# Patient Record
Sex: FEMALE | Race: WHITE | NOT HISPANIC OR LATINO | Employment: OTHER | URBAN - METROPOLITAN AREA
[De-identification: names, ages, dates, MRNs, and addresses within clinical notes are randomized per-mention and may not be internally consistent; named-entity substitution may affect disease eponyms.]

---

## 2018-04-09 DIAGNOSIS — H90.8 MIXED HEARING LOSS: Primary | ICD-10-CM

## 2018-04-10 ENCOUNTER — OFFICE VISIT (OUTPATIENT)
Dept: OTOLARYNGOLOGY | Facility: CLINIC | Age: 83
End: 2018-04-10
Payer: MEDICARE

## 2018-04-10 ENCOUNTER — OFFICE VISIT (OUTPATIENT)
Dept: AUDIOLOGY | Facility: CLINIC | Age: 83
End: 2018-04-10
Payer: MEDICARE

## 2018-04-10 VITALS — WEIGHT: 137 LBS | HEIGHT: 62 IN | BODY MASS INDEX: 25.21 KG/M2

## 2018-04-10 DIAGNOSIS — H90.3 SENSORY HEARING LOSS, BILATERAL: Primary | ICD-10-CM

## 2018-04-10 DIAGNOSIS — H61.23 EXCESSIVE CERUMEN IN BOTH EAR CANALS: ICD-10-CM

## 2018-04-10 DIAGNOSIS — H90.3 ASYMMETRICAL SENSORINEURAL HEARING LOSS: Primary | ICD-10-CM

## 2018-04-10 ASSESSMENT — PAIN SCALES - GENERAL: PAINLEVEL: NO PAIN (0)

## 2018-04-10 NOTE — PROGRESS NOTES
AUDIOLOGY REPORT    SUMMARY: Audiology visit completed. See audiogram for results.      RECOMMENDATIONS: Follow-up with ENT.      Elias George, CCC-A  Licensed Audiologist  [place] #[id number]

## 2018-04-10 NOTE — PROGRESS NOTES
Dear Tyrel Melchor:    I had the pleasure of seeing Tereas Park in followup today at the [facility].    HISTORY OF PRESENT ILLNESS: Patient is an 87-year-old in today with her 2 daughters daughters. She was last seen in July 2015. She had a left sudden sensorineural hearing loss in 2008, normal MRI scan. She has a moderate severe sensorineural hearing loss on the right side that we have been monitoring. She comes in today with her daughters mainly to check with her hearing. She doesn't feel there has been a dramatic change, daughters have noticed problems mainly with the telephone lately. There's been no pain or drainage. Denies any dizziness. There is no dysphagia, hoarseness, patient paresthesias. She's have a hearing aid that does help, she has a bi-cross but never uses C opposite hearing aid.    MEDICATIONS: Please refer to the detailed medication reconciliation performed by my nurse today, which I have reviewed and signed.     ALLERGIES:    Allergies   Allergen Reactions     Morphine Nausea       HABITS:   Alcohol use No    History   Smoking Status     Former Smoker     Quit date: 7/21/1951   Smokeless Tobacco     Former User         PAST MEDICAL HISTORY:  Please see today's intake form (for the remainder of the PMH) which I reviewed and signed.  No past medical history on file.    FAMILY HISTORY/SOCIAL HISTORY:  No family history on file. Social History     Social History     Marital status:      Spouse name: N/A     Number of children: N/A     Years of education: N/A     Occupational History     Not on file.     Social History Main Topics     Smoking status: Former Smoker     Quit date: 7/21/1951     Smokeless tobacco: Former User     Alcohol use No     Drug use: No     Sexual activity: Not on file     Other Topics Concern     Not on file     Social History Narrative       REVIEW OF SYSTEMS: Please see today's intake form (for the remainder of the ROS)  which I have reviewed and signed.    PHYSICIAL EXAMINATION:  Constitutional: The patient was well-groomed and in no acute distress.   Skin: Warm and pink.  Psychiatric: The patient's affect was calm, cooperative, and appropriate.   Respiratory: Breathing comfortably without stridor or exertion of accessory muscles.  Eyes: Pupils were equal and reactive. Extraocular movement intact.   Head: Normocephalic and atraumatic. No lesions or scars.  Ears: Both ears examined and she does have considerable cerumen. For full visualization of the TM, right side was cleaned using microscope, curet, and similar techniques. Following cleaning, under high-power magnification, TM and middle ear looked normal. The opposite ear was cleaned and examined using microscope, curet, and similar techniques. TM and middle ear looked normal after cleaning.  Nose: Sinuses were nontender. Anterior rhinoscopy revealed midline septum and absence of purulence or polyps.  Oral Cavity: Normal tongue, floor of moth, buccal mucosa, and palate. No abnormal lymph tissue in the oropharynx.   Neck: The parotid is soft without masses. Supple with normal laryngeal and tracheal landmarks.   Lymphatic: There is no palpable lymphadenopathy or other masses in the neck.   Neurologic: Alert and oriented x 3. Cranial nerves III-XI within normal limits. Voice quality normal.  Cerebellar Function Tests:  Grossly normal    Audiogram:  Audiogram performed shows no hearing in the left ear with 0% discrimination. Right side shows a moderate severe slightly worse in the high frequencies sensorineural hearing loss with discrimination at 60%. I do not have any old hearing test to compare to.    IMPRESSION AND PLAN: Discussed her normal exam today after cleaning of cerumen. We discussed hearing aids as the only option to help with her hearing, she is going to look into getting newer hearing aids, to at least trial. I'm going to get her previous audiogram so we can make sure  she is not showing progression. See her back with any concerns or problems, otherwise recommend follow-up to monitor every 1-2 years.    Thank you very much for the opportunity to participate in the care of your patient.    Rick L Nissen MD

## 2018-04-10 NOTE — MR AVS SNAPSHOT
After Visit Summary   4/10/2018    Teresa Park    MRN: [id number]           Patient Information     Date Of Birth          11/21/1930        Visit Information        Provider Department      4/10/2018 10:00 AM Keily Guzmán AuD M [facility] Audiology        Today's Diagnoses     Sensory hearing loss, bilateral    -  1       Follow-ups after your visit        Your next 10 appointments already scheduled     Apr 10, 2018 11:00 AM CDT   (Arrive by 10:45 AM)   RETURN NEUROTOLOGY with Rick L Nissen, MD [facility] Ear Nose and Throat ([facility])    [address] [place] [address] [phone]              Who to contact     Please call your clinic at [phone] to:    Ask questions about your health    Make or cancel appointments    Discuss your medicines    Learn about your test results    Speak to your doctor            Additional Information About Your Visit        MyChart Information     [employer] gives you secure access to your electronic health record. If you see a primary care provider, you can also send messages to your care team and make appointments. If you have questions, please call your primary care clinic.  If you do not have a primary care provider, please call [phone] and they will assist you.      [employer] is an electronic gateway that provides easy, online access to your medical records. With [employer], you can request a clinic appointment, read your test results, renew a prescription or communicate with your care team.     To access your existing account, please contact your [facility] or call [phone] for assistance.        Care EveryWhere ID     This is your Care EveryWhere ID. This could be used by other organizations to access your [facility] medical records  [id number]         Blood Pressure from Last 3 Encounters:   No data found for BP    Weight from Last 3 Encounters:   No data found  for Wt              We Performed the Following     AUDIOGRAM/TYMPANOGRAM - INTERFACE     Reduced 52 - Cmprhn Audiometry Thrshld Eval & Speech Recog   (20269)     Tymps / Reflex   ([id number])        Primary Care Provider Office Phone # Fax #    Tyrel Vu [phone]       [facility] [address] [place] [address]        Equal Access to Services     [facility] : Hadii aad ku hadasho Soomaali, waaxda luqadaha, qaybta kaalmada adeegyada, waxay idiin hayaan adeeg kharash la'aan ah. So [facility] [phone].    ATENCIÓN: Si habla esptelma, tiene a tom disposición servicios gratuitos de asistencia lingüística. Llame al [phone].    We comply with applicable federal civil rights laws and [place] laws. We do not discriminate on the basis of race, color, national origin, age, disability, sex, sexual orientation, or gender identity.            Thank you!     Thank you for choosing [facility] AUDIOLOGY  for your care. Our goal is always to provide you with excellent care. Hearing back from our patients is one way we can continue to improve our services. Please take a few minutes to complete the written survey that you may receive in the mail after your visit with us. Thank you!             Your Updated Medication List - Protect others around you: Learn how to safely use, store and throw away your medicines at www.disposemymeds.org.          This list is accurate as of 4/10/18 10:40 AM.  Always use your most recent med list.                   Brand Name Dispense Instructions for use Diagnosis    aspirin 81 MG tablet      Take by mouth daily        CELEXA PO      Take 20 mg by mouth daily        EVISTA 60 MG tablet   Generic drug:  raloxifene      Take 60 mg by mouth daily        LIPITOR PO      Take 40 mg by mouth At Bedtime        Multi-vitamin Tabs tablet      Take 1 tablet by mouth daily        NORVASC PO      Take 5 mg by mouth daily        WELLBUTRIN PO      Take 100 mg by mouth 2 times  daily        ZETIA PO      Take 5 mg by mouth At Bedtime

## 2018-04-10 NOTE — MR AVS SNAPSHOT
"              After Visit Summary   4/10/2018    Teresa Park    MRN: [id number]           Patient Information     Date Of Birth          11/21/1930        Visit Information        Provider Department      4/10/2018 11:00 AM Nissen, Rick L, MD [facility]        Today's Diagnoses     Asymmetrical sensorineural hearing loss    -  1    Excessive cerumen in both ear canals           Follow-ups after your visit        Who to contact     Please call your clinic at [phone] to:    Ask questions about your health    Make or cancel appointments    Discuss your medicines    Learn about your test results    Speak to your doctor            Additional Information About Your Visit        MyChart Information     [employer] gives you secure access to your electronic health record. If you see a primary care provider, you can also send messages to your care team and make appointments. If you have questions, please call your primary care clinic.  If you do not have a primary care provider, please call [phone] and they will assist you.      [employer] is an electronic gateway that provides easy, online access to your medical records. With [employer], you can request a clinic appointment, read your test results, renew a prescription or communicate with your care team.     To access your existing account, please contact your [facility] or call [phone] for assistance.        Care EveryWhere ID     This is your Care EveryWhere ID. This could be used by other organizations to access your [facility] medical records  [id number]        Your Vitals Were     Height BMI (Body Mass Index)                1.562 m (5' 1.5\") 25.47 kg/m2           Blood Pressure from Last 3 Encounters:   No data found for BP    Weight from Last 3 Encounters:   04/10/18 62.1 kg (137 lb)              We Performed the Following     BINOCULAR MICROSCOPY        Primary Care Provider Office Phone # Fax #    Tyrel " MD Mirella [phone]       [facility] [address] [place] [address]        Equal Access to Services     JACOBY BERNAL : Hadii aad ku hadalonzofawad Parks, wamurrayda chaceqadaha, qaybta kaalmada hermes, waxjoy amein hayaajaida wolfebakari ayaznoe melissa celis. So [facility] [phone].    ATENCIÓN: Si habla español, tiene a tom disposición servicios gratuitos de asistencia lingüística. Llame al [phone].    We comply with applicable federal civil rights laws and [place] laws. We do not discriminate on the basis of race, color, national origin, age, disability, sex, sexual orientation, or gender identity.            Thank you!     Thank you for choosing [facility] EAR NOSE AND THROAT  for your care. Our goal is always to provide you with excellent care. Hearing back from our patients is one way we can continue to improve our services. Please take a few minutes to complete the written survey that you may receive in the mail after your visit with us. Thank you!             Your Updated Medication List - Protect others around you: Learn how to safely use, store and throw away your medicines at www.disposemymeds.org.          This list is accurate as of 4/10/18 12:48 PM.  Always use your most recent med list.                   Brand Name Dispense Instructions for use Diagnosis    aspirin 81 MG tablet      Take by mouth daily        CELEXA PO      Take 20 mg by mouth daily        EVISTA 60 MG tablet   Generic drug:  raloxifene      Take 60 mg by mouth daily        LIPITOR PO      Take 40 mg by mouth At Bedtime        Multi-vitamin Tabs tablet      Take 1 tablet by mouth daily        NORVASC PO      Take 5 mg by mouth daily        WELLBUTRIN PO      Take 100 mg by mouth 2 times daily        ZETIA PO      Take 5 mg by mouth At Bedtime

## 2019-10-01 ENCOUNTER — HEALTH MAINTENANCE LETTER (OUTPATIENT)
Age: 84
End: 2019-10-01

## 2019-12-15 ENCOUNTER — HEALTH MAINTENANCE LETTER (OUTPATIENT)
Age: 84
End: 2019-12-15

## 2021-01-15 ENCOUNTER — HEALTH MAINTENANCE LETTER (OUTPATIENT)
Age: 86
End: 2021-01-15

## 2021-09-04 ENCOUNTER — HEALTH MAINTENANCE LETTER (OUTPATIENT)
Age: 86
End: 2021-09-04

## 2022-02-19 ENCOUNTER — HEALTH MAINTENANCE LETTER (OUTPATIENT)
Age: 87
End: 2022-02-19

## 2022-10-16 ENCOUNTER — HEALTH MAINTENANCE LETTER (OUTPATIENT)
Age: 87
End: 2022-10-16

## 2023-04-01 ENCOUNTER — HEALTH MAINTENANCE LETTER (OUTPATIENT)
Age: 88
End: 2023-04-01

## 2024-02-16 ENCOUNTER — APPOINTMENT (OUTPATIENT)
Dept: CARDIOLOGY | Facility: CLINIC | Age: 89
DRG: 280 | End: 2024-02-16
Attending: STUDENT IN AN ORGANIZED HEALTH CARE EDUCATION/TRAINING PROGRAM
Payer: MEDICARE

## 2024-02-16 ENCOUNTER — DOCUMENTATION ONLY (OUTPATIENT)
Dept: OTHER | Facility: CLINIC | Age: 89
End: 2024-02-16

## 2024-02-16 ENCOUNTER — HOSPITAL ENCOUNTER (INPATIENT)
Facility: CLINIC | Age: 89
LOS: 7 days | Discharge: HOME HEALTH | DRG: 280 | End: 2024-02-23
Attending: EMERGENCY MEDICINE | Admitting: STUDENT IN AN ORGANIZED HEALTH CARE EDUCATION/TRAINING PROGRAM
Payer: MEDICARE

## 2024-02-16 ENCOUNTER — APPOINTMENT (OUTPATIENT)
Dept: GENERAL RADIOLOGY | Facility: CLINIC | Age: 89
DRG: 280 | End: 2024-02-16
Attending: EMERGENCY MEDICINE
Payer: MEDICARE

## 2024-02-16 DIAGNOSIS — J81.0 ACUTE PULMONARY EDEMA (H): ICD-10-CM

## 2024-02-16 DIAGNOSIS — J96.02 ACUTE RESPIRATORY FAILURE WITH HYPOXIA AND HYPERCAPNIA (H): ICD-10-CM

## 2024-02-16 DIAGNOSIS — J96.01 ACUTE RESPIRATORY FAILURE WITH HYPOXIA AND HYPERCAPNIA (H): ICD-10-CM

## 2024-02-16 DIAGNOSIS — R06.02 SOB (SHORTNESS OF BREATH): ICD-10-CM

## 2024-02-16 DIAGNOSIS — I25.10 CORONARY ARTERY DISEASE INVOLVING NATIVE CORONARY ARTERY OF NATIVE HEART WITHOUT ANGINA PECTORIS: ICD-10-CM

## 2024-02-16 DIAGNOSIS — I50.23 ACUTE ON CHRONIC SYSTOLIC HEART FAILURE (H): Primary | ICD-10-CM

## 2024-02-16 LAB
ALBUMIN SERPL BCG-MCNC: 3.8 G/DL (ref 3.5–5.2)
ALBUMIN UR-MCNC: 30 MG/DL
ALP SERPL-CCNC: 110 U/L (ref 40–150)
ALT SERPL W P-5'-P-CCNC: 20 U/L (ref 0–50)
ANION GAP SERPL CALCULATED.3IONS-SCNC: 15 MMOL/L (ref 7–15)
APPEARANCE UR: CLEAR
AST SERPL W P-5'-P-CCNC: 37 U/L (ref 0–45)
ATRIAL RATE - MUSE: 123 BPM
BACTERIA #/AREA URNS HPF: ABNORMAL /HPF
BASOPHILS # BLD AUTO: ABNORMAL 10*3/UL
BASOPHILS # BLD MANUAL: 0 10E3/UL (ref 0–0.2)
BASOPHILS NFR BLD AUTO: ABNORMAL %
BASOPHILS NFR BLD MANUAL: 0 %
BI-PLANE LVEF ECHO: NORMAL
BILIRUB SERPL-MCNC: 0.3 MG/DL
BILIRUB UR QL STRIP: NEGATIVE
BUN SERPL-MCNC: 21.6 MG/DL (ref 8–23)
CALCIUM SERPL-MCNC: 8.4 MG/DL (ref 8.2–9.6)
CHLORIDE SERPL-SCNC: 104 MMOL/L (ref 98–107)
COLOR UR AUTO: ABNORMAL
CPB POCT: NO
CREAT SERPL-MCNC: 1.08 MG/DL (ref 0.51–0.95)
DEPRECATED HCO3 PLAS-SCNC: 22 MMOL/L (ref 22–29)
DIASTOLIC BLOOD PRESSURE - MUSE: NORMAL MMHG
EGFRCR SERPLBLD CKD-EPI 2021: 48 ML/MIN/1.73M2
EOSINOPHIL # BLD AUTO: ABNORMAL 10*3/UL
EOSINOPHIL # BLD MANUAL: 0.4 10E3/UL (ref 0–0.7)
EOSINOPHIL NFR BLD AUTO: ABNORMAL %
EOSINOPHIL NFR BLD MANUAL: 4 %
ERYTHROCYTE [DISTWIDTH] IN BLOOD BY AUTOMATED COUNT: 15.2 % (ref 10–15)
FLUAV RNA SPEC QL NAA+PROBE: NEGATIVE
FLUBV RNA RESP QL NAA+PROBE: NEGATIVE
GLUCOSE SERPL-MCNC: 230 MG/DL (ref 70–99)
GLUCOSE UR STRIP-MCNC: NEGATIVE MG/DL
HBA1C MFR BLD: 5.2 %
HCO3 BLDV-SCNC: 23 MMOL/L (ref 21–28)
HCO3 BLDV-SCNC: 23 MMOL/L (ref 21–28)
HCO3 BLDV-SCNC: 24 MMOL/L (ref 21–28)
HCT VFR BLD AUTO: 37.2 % (ref 35–47)
HCT VFR BLD CALC: 38 % (ref 35–47)
HGB BLD-MCNC: 11.7 G/DL (ref 11.7–15.7)
HGB BLD-MCNC: 12.9 G/DL (ref 11.7–15.7)
HGB UR QL STRIP: NEGATIVE
HYALINE CASTS: 3 /LPF
IMM GRANULOCYTES # BLD: ABNORMAL 10*3/UL
IMM GRANULOCYTES NFR BLD: ABNORMAL %
INTERPRETATION ECG - MUSE: NORMAL
KETONES UR STRIP-MCNC: NEGATIVE MG/DL
LACTATE BLD-SCNC: 3 MMOL/L
LACTATE BLD-SCNC: 6.4 MMOL/L
LACTATE SERPL-SCNC: 1.5 MMOL/L (ref 0.7–2)
LEUKOCYTE ESTERASE UR QL STRIP: NEGATIVE
LYMPHOCYTES # BLD AUTO: ABNORMAL 10*3/UL
LYMPHOCYTES # BLD MANUAL: 6.6 10E3/UL (ref 0.8–5.3)
LYMPHOCYTES NFR BLD AUTO: ABNORMAL %
LYMPHOCYTES NFR BLD MANUAL: 64 %
MCH RBC QN AUTO: 29.5 PG (ref 26.5–33)
MCHC RBC AUTO-ENTMCNC: 31.5 G/DL (ref 31.5–36.5)
MCV RBC AUTO: 94 FL (ref 78–100)
MONOCYTES # BLD AUTO: ABNORMAL 10*3/UL
MONOCYTES # BLD MANUAL: 0.5 10E3/UL (ref 0–1.3)
MONOCYTES NFR BLD AUTO: ABNORMAL %
MONOCYTES NFR BLD MANUAL: 5 %
MUCOUS THREADS #/AREA URNS LPF: PRESENT /LPF
NEUTROPHILS # BLD AUTO: ABNORMAL 10*3/UL
NEUTROPHILS # BLD MANUAL: 2.8 10E3/UL (ref 1.6–8.3)
NEUTROPHILS NFR BLD AUTO: ABNORMAL %
NEUTROPHILS NFR BLD MANUAL: 27 %
NITRATE UR QL: NEGATIVE
NRBC # BLD AUTO: 0 10E3/UL
NRBC BLD AUTO-RTO: 0 /100
NT-PROBNP SERPL-MCNC: ABNORMAL PG/ML (ref 0–1800)
P AXIS - MUSE: -27 DEGREES
PCO2 BLDV: 55 MM HG (ref 40–50)
PCO2 BLDV: 79 MM HG (ref 40–50)
PCO2 BLDV: 79 MM HG (ref 40–50)
PH BLDV: 7.08 [PH] (ref 7.32–7.43)
PH BLDV: 7.08 [PH] (ref 7.32–7.43)
PH BLDV: 7.25 [PH] (ref 7.32–7.43)
PH UR STRIP: 5.5 [PH] (ref 5–7)
PLAT MORPH BLD: ABNORMAL
PLATELET # BLD AUTO: 293 10E3/UL (ref 150–450)
PO2 BLDV: 36 MM HG (ref 25–47)
PO2 BLDV: 54 MM HG (ref 25–47)
PO2 BLDV: 55 MM HG (ref 25–47)
POTASSIUM BLD-SCNC: 4.2 MMOL/L (ref 3.4–5.3)
POTASSIUM SERPL-SCNC: 3.8 MMOL/L (ref 3.4–5.3)
PR INTERVAL - MUSE: 152 MS
PROCALCITONIN SERPL IA-MCNC: 0.08 NG/ML
PROT SERPL-MCNC: 6.3 G/DL (ref 6.4–8.3)
QRS DURATION - MUSE: 158 MS
QT - MUSE: 378 MS
QTC - MUSE: 541 MS
R AXIS - MUSE: -50 DEGREES
RBC # BLD AUTO: 3.97 10E6/UL (ref 3.8–5.2)
RBC MORPH BLD: ABNORMAL
RBC URINE: 11 /HPF
RSV RNA SPEC NAA+PROBE: NEGATIVE
SAO2 % BLDV: 58 % (ref 70–75)
SAO2 % BLDV: 72 % (ref 70–75)
SAO2 % BLDV: 73 % (ref 70–75)
SARS-COV-2 RNA RESP QL NAA+PROBE: NEGATIVE
SODIUM BLD-SCNC: 141 MMOL/L (ref 135–145)
SODIUM SERPL-SCNC: 141 MMOL/L (ref 135–145)
SP GR UR STRIP: 1.03 (ref 1–1.03)
SYSTOLIC BLOOD PRESSURE - MUSE: NORMAL MMHG
T AXIS - MUSE: 109 DEGREES
TROPONIN T SERPL HS-MCNC: 35 NG/L
TROPONIN T SERPL HS-MCNC: 65 NG/L
TROPONIN T SERPL HS-MCNC: 74 NG/L
UROBILINOGEN UR STRIP-MCNC: NORMAL MG/DL
VENTRICULAR RATE- MUSE: 123 BPM
WBC # BLD AUTO: 10.3 10E3/UL (ref 4–11)
WBC URINE: 2 /HPF

## 2024-02-16 PROCEDURE — 84484 ASSAY OF TROPONIN QUANT: CPT | Performed by: NURSE PRACTITIONER

## 2024-02-16 PROCEDURE — 82803 BLOOD GASES ANY COMBINATION: CPT

## 2024-02-16 PROCEDURE — 87637 SARSCOV2&INF A&B&RSV AMP PRB: CPT | Performed by: EMERGENCY MEDICINE

## 2024-02-16 PROCEDURE — 250N000011 HC RX IP 250 OP 636: Performed by: EMERGENCY MEDICINE

## 2024-02-16 PROCEDURE — 99222 1ST HOSP IP/OBS MODERATE 55: CPT | Performed by: INTERNAL MEDICINE

## 2024-02-16 PROCEDURE — 250N000011 HC RX IP 250 OP 636: Performed by: STUDENT IN AN ORGANIZED HEALTH CARE EDUCATION/TRAINING PROGRAM

## 2024-02-16 PROCEDURE — 999N000208 ECHOCARDIOGRAM COMPLETE

## 2024-02-16 PROCEDURE — 87040 BLOOD CULTURE FOR BACTERIA: CPT | Performed by: EMERGENCY MEDICINE

## 2024-02-16 PROCEDURE — 36415 COLL VENOUS BLD VENIPUNCTURE: CPT | Performed by: STUDENT IN AN ORGANIZED HEALTH CARE EDUCATION/TRAINING PROGRAM

## 2024-02-16 PROCEDURE — 93005 ELECTROCARDIOGRAM TRACING: CPT

## 2024-02-16 PROCEDURE — 120N000001 HC R&B MED SURG/OB

## 2024-02-16 PROCEDURE — 36415 COLL VENOUS BLD VENIPUNCTURE: CPT | Performed by: EMERGENCY MEDICINE

## 2024-02-16 PROCEDURE — 94640 AIRWAY INHALATION TREATMENT: CPT

## 2024-02-16 PROCEDURE — 99223 1ST HOSP IP/OBS HIGH 75: CPT | Mod: AI | Performed by: STUDENT IN AN ORGANIZED HEALTH CARE EDUCATION/TRAINING PROGRAM

## 2024-02-16 PROCEDURE — 85027 COMPLETE CBC AUTOMATED: CPT | Performed by: EMERGENCY MEDICINE

## 2024-02-16 PROCEDURE — 96375 TX/PRO/DX INJ NEW DRUG ADDON: CPT

## 2024-02-16 PROCEDURE — 84484 ASSAY OF TROPONIN QUANT: CPT | Performed by: EMERGENCY MEDICINE

## 2024-02-16 PROCEDURE — 250N000013 HC RX MED GY IP 250 OP 250 PS 637: Performed by: STUDENT IN AN ORGANIZED HEALTH CARE EDUCATION/TRAINING PROGRAM

## 2024-02-16 PROCEDURE — 96365 THER/PROPH/DIAG IV INF INIT: CPT | Mod: 59

## 2024-02-16 PROCEDURE — 93306 TTE W/DOPPLER COMPLETE: CPT | Mod: 26 | Performed by: INTERNAL MEDICINE

## 2024-02-16 PROCEDURE — 250N000009 HC RX 250: Performed by: EMERGENCY MEDICINE

## 2024-02-16 PROCEDURE — 250N000011 HC RX IP 250 OP 636: Performed by: NURSE PRACTITIONER

## 2024-02-16 PROCEDURE — 999N000157 HC STATISTIC RCP TIME EA 10 MIN

## 2024-02-16 PROCEDURE — 83036 HEMOGLOBIN GLYCOSYLATED A1C: CPT | Performed by: STUDENT IN AN ORGANIZED HEALTH CARE EDUCATION/TRAINING PROGRAM

## 2024-02-16 PROCEDURE — 80053 COMPREHEN METABOLIC PANEL: CPT | Performed by: EMERGENCY MEDICINE

## 2024-02-16 PROCEDURE — 85007 BL SMEAR W/DIFF WBC COUNT: CPT | Performed by: EMERGENCY MEDICINE

## 2024-02-16 PROCEDURE — 36415 COLL VENOUS BLD VENIPUNCTURE: CPT

## 2024-02-16 PROCEDURE — 255N000002 HC RX 255 OP 636: Performed by: EMERGENCY MEDICINE

## 2024-02-16 PROCEDURE — 71045 X-RAY EXAM CHEST 1 VIEW: CPT

## 2024-02-16 PROCEDURE — 83880 ASSAY OF NATRIURETIC PEPTIDE: CPT | Performed by: EMERGENCY MEDICINE

## 2024-02-16 PROCEDURE — 84145 PROCALCITONIN (PCT): CPT | Performed by: STUDENT IN AN ORGANIZED HEALTH CARE EDUCATION/TRAINING PROGRAM

## 2024-02-16 PROCEDURE — 36415 COLL VENOUS BLD VENIPUNCTURE: CPT | Performed by: NURSE PRACTITIONER

## 2024-02-16 PROCEDURE — 84484 ASSAY OF TROPONIN QUANT: CPT | Performed by: STUDENT IN AN ORGANIZED HEALTH CARE EDUCATION/TRAINING PROGRAM

## 2024-02-16 PROCEDURE — 99285 EMERGENCY DEPT VISIT HI MDM: CPT | Mod: 25

## 2024-02-16 PROCEDURE — 81001 URINALYSIS AUTO W/SCOPE: CPT | Performed by: EMERGENCY MEDICINE

## 2024-02-16 PROCEDURE — 94660 CPAP INITIATION&MGMT: CPT

## 2024-02-16 PROCEDURE — 83605 ASSAY OF LACTIC ACID: CPT

## 2024-02-16 RX ORDER — AMLODIPINE BESYLATE 5 MG/1
5 TABLET ORAL EVERY MORNING
Status: ON HOLD | COMMUNITY
End: 2024-02-23

## 2024-02-16 RX ORDER — CITALOPRAM HYDROBROMIDE 20 MG/1
20 TABLET ORAL EVERY MORNING
Status: DISCONTINUED | OUTPATIENT
Start: 2024-02-16 | End: 2024-02-23 | Stop reason: HOSPADM

## 2024-02-16 RX ORDER — CALCIUM CARBONATE 500 MG/1
1000 TABLET, CHEWABLE ORAL 4 TIMES DAILY PRN
Status: DISCONTINUED | OUTPATIENT
Start: 2024-02-16 | End: 2024-02-23 | Stop reason: HOSPADM

## 2024-02-16 RX ORDER — MULTIVITAMIN,THERAPEUTIC
1 TABLET ORAL EVERY EVENING
COMMUNITY
End: 2024-03-04

## 2024-02-16 RX ORDER — AMOXICILLIN 250 MG
2 CAPSULE ORAL 2 TIMES DAILY PRN
Status: DISCONTINUED | OUTPATIENT
Start: 2024-02-16 | End: 2024-02-23 | Stop reason: HOSPADM

## 2024-02-16 RX ORDER — FUROSEMIDE 10 MG/ML
20 INJECTION INTRAMUSCULAR; INTRAVENOUS
Status: DISCONTINUED | OUTPATIENT
Start: 2024-02-16 | End: 2024-02-16

## 2024-02-16 RX ORDER — ONDANSETRON 4 MG/1
4 TABLET, ORALLY DISINTEGRATING ORAL EVERY 6 HOURS PRN
Status: DISCONTINUED | OUTPATIENT
Start: 2024-02-16 | End: 2024-02-23 | Stop reason: HOSPADM

## 2024-02-16 RX ORDER — CITALOPRAM HYDROBROMIDE 20 MG/1
20 TABLET ORAL EVERY MORNING
COMMUNITY

## 2024-02-16 RX ORDER — ACETAMINOPHEN 500 MG
500-1000 TABLET ORAL EVERY 6 HOURS PRN
Status: DISCONTINUED | OUTPATIENT
Start: 2024-02-16 | End: 2024-02-23 | Stop reason: HOSPADM

## 2024-02-16 RX ORDER — IPRATROPIUM BROMIDE AND ALBUTEROL SULFATE 2.5; .5 MG/3ML; MG/3ML
3 SOLUTION RESPIRATORY (INHALATION) ONCE
Status: COMPLETED | OUTPATIENT
Start: 2024-02-16 | End: 2024-02-16

## 2024-02-16 RX ORDER — PIPERACILLIN SODIUM, TAZOBACTAM SODIUM 4; .5 G/20ML; G/20ML
4.5 INJECTION, POWDER, LYOPHILIZED, FOR SOLUTION INTRAVENOUS ONCE
Status: COMPLETED | OUTPATIENT
Start: 2024-02-16 | End: 2024-02-16

## 2024-02-16 RX ORDER — GABAPENTIN 100 MG/1
100 CAPSULE ORAL
Status: DISCONTINUED | OUTPATIENT
Start: 2024-02-16 | End: 2024-02-23 | Stop reason: HOSPADM

## 2024-02-16 RX ORDER — BUPROPION HYDROCHLORIDE 100 MG/1
100 TABLET ORAL 2 TIMES DAILY
COMMUNITY

## 2024-02-16 RX ORDER — RALOXIFENE HYDROCHLORIDE 60 MG/1
60 TABLET, FILM COATED ORAL EVERY EVENING
COMMUNITY
End: 2024-03-04

## 2024-02-16 RX ORDER — BUPROPION HYDROCHLORIDE 100 MG/1
100 TABLET ORAL 2 TIMES DAILY
Status: DISCONTINUED | OUTPATIENT
Start: 2024-02-16 | End: 2024-02-23 | Stop reason: HOSPADM

## 2024-02-16 RX ORDER — LIDOCAINE 40 MG/G
CREAM TOPICAL
Status: DISCONTINUED | OUTPATIENT
Start: 2024-02-16 | End: 2024-02-23 | Stop reason: HOSPADM

## 2024-02-16 RX ORDER — FUROSEMIDE 10 MG/ML
40 INJECTION INTRAMUSCULAR; INTRAVENOUS ONCE
Status: COMPLETED | OUTPATIENT
Start: 2024-02-16 | End: 2024-02-16

## 2024-02-16 RX ORDER — FUROSEMIDE 10 MG/ML
60 INJECTION INTRAMUSCULAR; INTRAVENOUS
Status: DISCONTINUED | OUTPATIENT
Start: 2024-02-17 | End: 2024-02-17

## 2024-02-16 RX ORDER — ONDANSETRON 2 MG/ML
4 INJECTION INTRAMUSCULAR; INTRAVENOUS EVERY 6 HOURS PRN
Status: DISCONTINUED | OUTPATIENT
Start: 2024-02-16 | End: 2024-02-23 | Stop reason: HOSPADM

## 2024-02-16 RX ORDER — FUROSEMIDE 10 MG/ML
20 INJECTION INTRAMUSCULAR; INTRAVENOUS ONCE
Status: COMPLETED | OUTPATIENT
Start: 2024-02-16 | End: 2024-02-16

## 2024-02-16 RX ORDER — FUROSEMIDE 10 MG/ML
60 INJECTION INTRAMUSCULAR; INTRAVENOUS
Status: DISCONTINUED | OUTPATIENT
Start: 2024-02-16 | End: 2024-02-16

## 2024-02-16 RX ORDER — ACETAMINOPHEN 500 MG
500-1000 TABLET ORAL EVERY 6 HOURS PRN
COMMUNITY
End: 2024-05-07

## 2024-02-16 RX ORDER — GABAPENTIN 100 MG/1
100 CAPSULE ORAL
COMMUNITY

## 2024-02-16 RX ORDER — AMOXICILLIN 250 MG
1 CAPSULE ORAL 2 TIMES DAILY PRN
Status: DISCONTINUED | OUTPATIENT
Start: 2024-02-16 | End: 2024-02-23 | Stop reason: HOSPADM

## 2024-02-16 RX ORDER — PIPERACILLIN SODIUM, TAZOBACTAM SODIUM 4; .5 G/20ML; G/20ML
4.5 INJECTION, POWDER, LYOPHILIZED, FOR SOLUTION INTRAVENOUS ONCE
Status: DISCONTINUED | OUTPATIENT
Start: 2024-02-16 | End: 2024-02-16

## 2024-02-16 RX ORDER — AMOXICILLIN 500 MG/1
500 CAPSULE ORAL
COMMUNITY
End: 2024-05-07

## 2024-02-16 RX ADMIN — EZETIMIBE 5 MG: 10 TABLET ORAL at 13:35

## 2024-02-16 RX ADMIN — IPRATROPIUM BROMIDE AND ALBUTEROL SULFATE 3 ML: .5; 3 SOLUTION RESPIRATORY (INHALATION) at 05:58

## 2024-02-16 RX ADMIN — FUROSEMIDE 20 MG: 10 INJECTION, SOLUTION INTRAMUSCULAR; INTRAVENOUS at 10:27

## 2024-02-16 RX ADMIN — FUROSEMIDE 40 MG: 10 INJECTION, SOLUTION INTRAMUSCULAR; INTRAVENOUS at 17:47

## 2024-02-16 RX ADMIN — GABAPENTIN 100 MG: 100 CAPSULE ORAL at 20:26

## 2024-02-16 RX ADMIN — FUROSEMIDE 20 MG: 10 INJECTION, SOLUTION INTRAMUSCULAR; INTRAVENOUS at 16:09

## 2024-02-16 RX ADMIN — PIPERACILLIN AND TAZOBACTAM 4.5 G: 4; .5 INJECTION, POWDER, FOR SOLUTION INTRAVENOUS at 06:08

## 2024-02-16 RX ADMIN — BUPROPION HYDROCHLORIDE 100 MG: 100 TABLET, FILM COATED ORAL at 13:35

## 2024-02-16 RX ADMIN — CITALOPRAM HYDROBROMIDE 20 MG: 20 TABLET ORAL at 13:35

## 2024-02-16 RX ADMIN — BUPROPION HYDROCHLORIDE 100 MG: 100 TABLET, FILM COATED ORAL at 20:26

## 2024-02-16 RX ADMIN — HUMAN ALBUMIN MICROSPHERES AND PERFLUTREN 9 ML: 10; .22 INJECTION, SOLUTION INTRAVENOUS at 09:29

## 2024-02-16 ASSESSMENT — ACTIVITIES OF DAILY LIVING (ADL)
CHANGE_IN_FUNCTIONAL_STATUS_SINCE_ONSET_OF_CURRENT_ILLNESS/INJURY: NO
FALL_HISTORY_WITHIN_LAST_SIX_MONTHS: NO
WEAR_GLASSES_OR_BLIND: YES
ADLS_ACUITY_SCORE: 30
EQUIPMENT_CURRENTLY_USED_AT_HOME: NONE;CANE, QUAD
DRESSING/BATHING_DIFFICULTY: NO
TOILETING_ISSUES: NO
ADLS_ACUITY_SCORE: 34
ADLS_ACUITY_SCORE: 35
VISION_MANAGEMENT: GLASSES
WALKING_OR_CLIMBING_STAIRS_DIFFICULTY: NO
DOING_ERRANDS_INDEPENDENTLY_DIFFICULTY: YES
DIFFICULTY_EATING/SWALLOWING: NO
ADLS_ACUITY_SCORE: 30
ADLS_ACUITY_SCORE: 24
ADLS_ACUITY_SCORE: 34
ADLS_ACUITY_SCORE: 32
ADLS_ACUITY_SCORE: 35
ADLS_ACUITY_SCORE: 35

## 2024-02-16 NOTE — ED TRIAGE NOTES
Pt BIBA from home. Pt was SOB on scene and was nonverbal laying on the floor. Per EMS pt was sat 70% on RA, 's over 90, resp 35's. . EMS gave 2g Mag, 1duo neb, 125mg solumedrol

## 2024-02-16 NOTE — PROGRESS NOTES
RECEIVING UNIT ED HANDOFF REVIEW    ED Nurse Handoff Report was reviewed by: Alex Hodges RN on February 16, 2024 at 10:34 AM

## 2024-02-16 NOTE — ED NOTES
Bed: [facility]  Expected date:   Expected time:   Means of arrival:   Comments:  Abby 1 89F Respiratory Distress

## 2024-02-16 NOTE — PHARMACY-ADMISSION MEDICATION HISTORY
Pharmacist Admission Medication History    Admission medication history is complete. The information provided in this note is only as accurate as the sources available at the time of the update.    Information Source(s): Family member via in-person    Pertinent Information: Pt's daughter found list of medications in pt's purse. Information taken from this list. Unsure of last dose times, but daughter states pt very good about taking her meds and most likely took all scheduled meds yesterday.     Changes made to PTA medication list:  Added: all meds  Deleted: None  Changed: None    Allergies reviewed with patient and updates made in EHR: yes    Medication History Completed By: Dana Cesar [facility] 2/16/2024 7:33 AM    PTA Med List   Medication Sig Last Dose    acetaminophen (TYLENOL) 500 MG tablet Take 500-1,000 mg by mouth every 6 hours as needed for mild pain prn    amLODIPine (NORVASC) 5 MG tablet Take 5 mg by mouth every morning 2/15/2024 at am    amoxicillin (AMOXIL) 500 MG capsule Take 500 mg by mouth Prior to dental appointments prn    buPROPion (WELLBUTRIN) 100 MG tablet Take 100 mg by mouth 2 times daily 2/15/2024    citalopram (CELEXA) 20 MG tablet Take 20 mg by mouth every morning 2/15/2024 at am    ezetimibe (ZETIA) 5 mg TABS half-tab Take 5 mg by mouth every morning 2/15/2024 at am    gabapentin (NEURONTIN) 100 MG capsule Take 100 mg by mouth nightly as needed for other (pain, insomnia) prn    multivitamin, therapeutic (THERA-VIT) TABS tablet Take 1 tablet by mouth every evening 2/15/2024    raloxifene (EVISTA) 60 MG tablet Take 60 mg by mouth every evening 2/15/2024

## 2024-02-16 NOTE — ED NOTES
[facility]  ED Nurse Handoff Report    ED Chief complaint: Respiratory Distress      ED Diagnosis:   Final diagnoses:   Acute respiratory failure with hypoxia and hypercapnia (H)   Acute pulmonary edema (H)   SOB (shortness of breath)       Code Status: admitting provider to address    Allergies: No Known Allergies    Patient Story: pt presents to ed for respiratory distress after pt called EMS this am due to not being able to breath. Pt notes that she has had ongoing sob for several months. Pt was placed on bipap in ED, and weaned off.     Focused Assessment:    Respiratory: On 4L oxymask, stating in the 90s  Cardiac: HR in the 90s, radial pulse regular  Neuro: Alert, opens eyes spontaneously, answers questions     Treatments and/or interventions provided:   Labs Ordered and Resulted from Time of ED Arrival to Time of ED Departure   COMPREHENSIVE METABOLIC PANEL - Abnormal       Result Value    Sodium 141      Potassium 3.8      Carbon Dioxide (CO2) 22      Anion Gap 15      Urea Nitrogen 21.6      Creatinine 1.08 (*)     GFR Estimate 48 (*)     Calcium 8.4      Chloride 104      Glucose 230 (*)     Alkaline Phosphatase 110      AST 37      ALT 20      Protein Total 6.3 (*)     Albumin 3.8      Bilirubin Total 0.3     TROPONIN T, HIGH SENSITIVITY - Abnormal    Troponin T, High Sensitivity 35 (*)    NT PROBNP INPATIENT - Abnormal    N terminal Pro BNP Inpatient 15,783 (*)    ISTAT GASES LACTATE VENOUS POCT - Abnormal    Lactic Acid POCT 6.4 (*)     Bicarbonate Venous POCT 23      O2 Sat, Venous POCT 73      pCO2 Venous POCT 79 (*)     pH Venous POCT 7.08 (*)     pO2 Venous POCT 55 (*)    CBC WITH PLATELETS AND DIFFERENTIAL - Abnormal    WBC Count 10.3      RBC Count 3.97      Hemoglobin 11.7      Hematocrit 37.2      MCV 94      MCH 29.5      MCHC 31.5      RDW 15.2 (*)     Platelet Count 293      % Neutrophils        % Lymphocytes        % Monocytes        % Eosinophils        % Basophils        %  Immature Granulocytes        Absolute Neutrophils        Absolute Lymphocytes        Absolute Monocytes        Absolute Eosinophils        Absolute Basophils        Absolute Immature Granulocytes       ISTAT GASES ELECTROLYTES VENOUS POCT - Abnormal    CPB Applied No      Hematocrit POCT 38      Bicarbonate Venous POCT 23      Hemoglobin POCT 12.9      Potassium POCT 4.2      Sodium POCT 141      pCO2 Venous POCT 79 (*)     pH Venous POCT 7.08 (*)     pO2 Venous POCT 54 (*)     O2 Sat, Venous POCT 72     ISTAT GASES LACTATE VENOUS POCT - Abnormal    Lactic Acid POCT 3.0 (*)     Bicarbonate Venous POCT 24      O2 Sat, Venous POCT 58 (*)     pCO2 Venous POCT 55 (*)     pH Venous POCT 7.25 (*)     pO2 Venous POCT 36     INFLUENZA A/B, RSV, & SARS-COV2 PCR - Normal    Influenza A PCR Negative      Influenza B PCR Negative      RSV PCR Negative      SARS CoV2 PCR Negative     ROUTINE UA WITH MICROSCOPIC REFLEX TO CULTURE   LACTIC ACID WHOLE BLOOD   BLOOD CULTURE   BLOOD CULTURE        XR Chest Port 1 View   Final Result   IMPRESSION: Mild to moderate cardiomegaly with small bilateral pleural effusions and pulmonary edema. No confluent airspace consolidation or pneumothorax.      Echocardiogram Complete    (Results Pending)      Patient's response to treatments and/or interventions: tolerated    To be done/followed up on inpatient unit:  frequent vitals, tele, repeat labs and imaging     Does this patient have any cognitive concerns?:  none    Activity level - Baseline/Home:  Independent  Activity Level - Current:   Unknown    Patient's Preferred language: Data Unavailable   Needed?: No    Isolation: None  Infection: Not Applicable  Patient tested for COVID 19 prior to admission: YES  Bariatric?: No    Vital Signs:   Vitals:    02/16/24 0645 02/16/24 0650 02/16/24 0704 02/16/24 0715   BP:  110/74 97/66 97/69   Pulse: 92 92 86 90   Resp: 22 23 23 22   Temp:       TempSrc:       SpO2: 96% 97% 98% 99%   Weight:            Cardiac Rhythm:     Was the PSS-3 completed:   Yes  What interventions are required if any?               Family Comments: daughter at bedside, involved in care  OBS brochure/video discussed/provided to patient/family: N/A              Name of person given brochure if not patient:               Relationship to patient:     For the majority of the shift this patient's behavior was Green.   Behavioral interventions performed were .    ED NURSE PHONE NUMBER: **[id number]

## 2024-02-16 NOTE — CONSULTS
[facility]    Cardiology Consultation     Date of Admission:  2/16/2024    Assessment & Plan   Teresa Krishna is a 89 year old female who was admitted on 2/16/2024 acute systolic heart failure    Acute systolic heart failure  Left bundle branch block, presumably chronic  Moderate LV systolic dysfunction, EF 32%  Trivial troponin elevation without significant delta, likely demand related  Hypertension  Moderate to severe aortic stenosis.    She is admitted with progressive dyspnea on exertion over the past many months.  Her current clinical presentation is consistent with acute systolic heart failure exacerbation.  The cause of her LV systolic dysfunction is unclear at this point.  This could be conduction related but given advanced age and risk factors coronary disease is also possibility    For now recommend managing her heart failure symptoms.  Will increase furosemide to 60 mg IV twice daily.  Strict I/Os and daily weights.  Once she is compensated, will initiate guideline directed medical therapy and rule out ischemia with stress test although reasonable to consider coronary angiography as well.     The aortic stenosis is not severe enough to warrant intervention at this point    I appreciate the opportunity to participate in your care    Milton Milligan MD, [facility]    High complexity     Milton Milligan MD, MD, MD    Primary Care Physician   Physician No Ref-Primary    Reason for Consult   Reason for consult: Acute systolic heart failure    History of Present Illness   Teresa Krishna is a 89 year old female with history of hypertension, hyperlipidemia, and coronary atherosclerosis based on prior CT calcium score who was admitted with acute hypoxic respiratory failure.    She was previously followed in the [facility].  She is independent and lives alone.  Echocardiogram from 2011 showed preserved LV function.  Her last electrocardiogram from 2022 showed normal sinus rhythm and normal  QRS.    She reports dyspnea on exertion over the past 1 year.  Her symptoms have been worsening over the last several months.  Today the symptoms were quite severe which prompted her to call EMS.  Initial evaluation here showed significantly elevated NT proBNP and chest imaging consistent with congestive heart failure.     Her electrocardiogram shows sinus tachycardia and left bundle branch block.  The chronicity of the LBBB is unclear but it is new since 2022.  She denies history of chest pain.  She also underwent transthoracic echocardiogram which demonstrated moderate LV systolic dysfunction (EF 32%) and septal motion abnormality consistent with conduction disease.    Past Medical History   No past medical history on file.    Past Surgical History   No past surgical history on file.    Prior to Admission Medications   Prior to Admission Medications   Prescriptions Last Dose Informant Patient Reported? Taking?   acetaminophen (TYLENOL) 500 MG tablet prn Daughter Yes Yes   Sig: Take 500-1,000 mg by mouth every 6 hours as needed for mild pain   amLODIPine (NORVASC) 5 MG tablet 2/15/2024 at am Daughter Yes Yes   Sig: Take 5 mg by mouth every morning   amoxicillin (AMOXIL) 500 MG capsule prn Daughter Yes Yes   Sig: Take 500 mg by mouth Prior to dental appointments   buPROPion (WELLBUTRIN) 100 MG tablet 2/15/2024 Daughter Yes Yes   Sig: Take 100 mg by mouth 2 times daily   citalopram (CELEXA) 20 MG tablet 2/15/2024 at am Daughter Yes Yes   Sig: Take 20 mg by mouth every morning   ezetimibe (ZETIA) 5 mg TABS half-tab 2/15/2024 at am Daughter Yes Yes   Sig: Take 5 mg by mouth every morning   gabapentin (NEURONTIN) 100 MG capsule prn Daughter Yes Yes   Sig: Take 100 mg by mouth nightly as needed for other (pain, insomnia)   multivitamin, therapeutic (THERA-VIT) TABS tablet 2/15/2024 Daughter Yes Yes   Sig: Take 1 tablet by mouth every evening   raloxifene (EVISTA) 60 MG tablet 2/15/2024 Daughter Yes Yes   Sig: Take 60 mg  "by mouth every evening      Facility-Administered Medications: None     Current Facility-Administered Medications   Medication Dose Route Frequency    buPROPion  100 mg Oral BID    citalopram  20 mg Oral QAM    ezetimibe  5 mg Oral QAM    furosemide  40 mg Intravenous Once    [START ON 2/17/2024] furosemide  60 mg Intravenous BID    sodium chloride (PF)  3 mL Intracatheter Q8H     Current Facility-Administered Medications   Medication Last Rate    - MEDICATION INSTRUCTIONS -      ACE/ARB/ARNI NOT PRESCRIBED      BETA BLOCKER NOT PRESCRIBED       Allergies   No Known Allergies    Social History        Family History       Review of Systems   A comprehensive review of system was performed and is negative other than that noted in the HPI or here.     Physical Exam   Vital Signs with Ranges  Temp:  [96.5  F (35.8  C)-98.4  F (36.9  C)] 98.1  F (36.7  C)  Pulse:  [] 102  Resp:  [15-35] 20  BP: ()/() 129/91  SpO2:  [95 %-100 %] 95 %  Wt Readings from Last 4 Encounters:   02/16/24 53.6 kg (118 lb 2.7 oz)     I/O last 3 completed shifts:  In: 240 [P.O.:240]  Out: 700 [Urine:700]      Vitals: BP (!) 129/91 (BP Location: Left arm)   Pulse 102   Temp 98.1  F (36.7  C) (Oral)   Resp 20   Wt 53.6 kg (118 lb 2.7 oz)   SpO2 95%     Physical Exam:   General - Alert and oriented to time place and person in no acute distress  Eyes - No scleral icterus  HEENT - Neck supple, moist mucous membranes  Cardiovascular -tachycardia  Extremities - There is trace edema  Respiratory -diminished at the bases  Skin - No pallor or cyanosis  Gastrointestinal - Non tender and non distended without rebound or guarding  Psych - Appropriate affect   Neurological - No gross motor neurological focal deficits    No lab results found in last 7 days.    Invalid input(s): \"TROPONINIES\"    Recent Labs   Lab 02/16/24  0554 02/16/24  0550   WBC 10.3  --    HGB 11.7 12.9   MCV 94  --      --     141   POTASSIUM 3.8 4.2 " "  CHLORIDE 104  --    CO2 22  --    BUN 21.6  --    CR 1.08*  --    GFRESTIMATED 48*  --    ANIONGAP 15  --    DANIELA 8.4  --    *  --    ALBUMIN 3.8  --    PROTTOTAL 6.3*  --    BILITOTAL 0.3  --    ALKPHOS 110  --    ALT 20  --    AST 37  --      No results for input(s): \"CHOL\", \"HDL\", \"LDL\", \"TRIG\", \"CHOLHDLRATIO\" in the last 14372 hours.  Recent Labs   Lab 24  0554 24  0550   WBC 10.3  --    HGB 11.7 12.9   HCT 37.2 38   MCV 94  --      --      Recent Labs   Lab 24  0650 24  0550   PHV 7.25* 7.08*  7.08*   PO2V 36 54*  55*   PCO2V 55* 79*  79*   HCO3V 24 23  23     Recent Labs   Lab 24  0554   NTBNPI 15,783*     No results for input(s): \"DD\" in the last 168 hours.  No results for input(s): \"SED\", \"CRP\" in the last 168 hours.  Recent Labs   Lab 24  0554        No results for input(s): \"TSH\" in the last 168 hours.  Recent Labs   Lab 24  0742   COLOR Light Yellow   APPEARANCE Clear   URINEGLC Negative   URINEBILI Negative   URINEKETONE Negative   SG 1.026   UBLD Negative   URINEPH 5.5   PROTEIN 30*   NITRITE Negative   LEUKEST Negative   RBCU 11*   WBCU 2       Imaging:  Recent Results (from the past 48 hour(s))   XR Chest Port 1 View    Narrative    EXAM: XR CHEST PORT 1 VIEW  LOCATION: [facility]  DATE: 2024    INDICATION: shortness of breath  COMPARISON: None.      Impression    IMPRESSION: Mild to moderate cardiomegaly with small bilateral pleural effusions and pulmonary edema. No confluent airspace consolidation or pneumothorax.   Echocardiogram Complete   Result Value    Biplane LVEF 32%    Narrative    [id number]  [id number]  [id number]  2010^POON^BREEZY^A     [facility]  Echocardiography Laboratory  [address] [place] [id number]     Name: DESHAUN DEAL  MRN: [id number]  : 1930  Study Date: 2024 09:00 AM  Age: 89 yrs  Gender: Female  Patient Location: [facility]  Reason For " Study: CHF  Ordering Physician: BREEZY POON  Referring Physician: RADHA PMD  Performed By: Tao Nagel RDCS     BSA: 1.5 m2  Height: 60 in  Weight: 118 lb  HR: 92  BP: 97/69 mmHg  ______________________________________________________________________________  Procedure  Complete Portable Echo Adult. Optison (NDC #[id number]) given intravenously.  ______________________________________________________________________________  Interpretation Summary     The left ventricle is normal in size.  Diastolic Doppler findings (E/E' ratio and/or other parameters) suggest left  ventricular filling pressures are increased.  Biplane LVEF is 32%.  Moderate to severe valvular aortic stenosis.  ______________________________________________________________________________  Left Ventricle  The left ventricle is normal in size. A sigmoid septum is present. Diastolic  Doppler findings (E/E' ratio and/or other parameters) suggest left ventricular  filling pressures are increased. Biplane LVEF is 32%. Septal motion is  consistent with conduction abnormality.     Right Ventricle  The right ventricle is normal in size and function.     Atria  The left atrium is moderately dilated. Right atrial size is normal. There is  no color Doppler evidence of an atrial shunt.     Mitral Valve  The mitral valve leaflets are mildly thickened. There is mild (1+) mitral  regurgitation.     Tricuspid Valve  There is mild (1+) tricuspid regurgitation. The right ventricular systolic  pressure is approximated at 37.0 mmHg plus the right atrial pressure. IVC  diameter and respiratory changes fall into an intermediate range suggesting an  RA pressure of 8 mmHg.     Aortic Valve  There is mild (1+) aortic regurgitation. The mean AoV pressure gradient is  30.0 mmHg. Moderate to severe valvular aortic stenosis.     Pulmonic Valve  There is trace pulmonic valvular regurgitation.     Vessels  Normal size aorta. The aortic root is normal size.      Pericardium  There is no pericardial effusion.     Rhythm  Sinus rhythm was noted.  ______________________________________________________________________________  MMode/2D Measurements & Calculations  IVSd: 1.1 cm     LVIDd: 4.3 cm  LVIDs: 3.7 cm  LVPWd: 1.2 cm  FS: 14.2 %  LV mass(C)d: 177.1 grams  LV mass(C)dI: 118.8 grams/m2  Ao root diam: 2.8 cm  LA dimension: 3.6 cm  LA/Ao: 1.3  LVOT diam: 1.9 cm  LVOT area: 2.9 cm2  Ao root diam index Ht(cm/m): 1.8  Ao root diam index BSA (cm/m2): 1.9  RV Base: 3.6 cm  RWT: 0.58  TAPSE: 2.7 cm     Doppler Measurements & Calculations  MV E max gabriel: 67.4 cm/sec  MV A max gabriel: 178.9 cm/sec  MV E/A: 0.38  Ao V2 max: 365.5 cm/sec  Ao max P.4 mmHg  Ao V2 mean: 262.2 cm/sec  Ao mean P.0 mmHg  Ao V2 VTI: 76.2 cm  IVÁN(I,D): 0.99 cm2  IVÁN(V,D): 1.1 cm2  AI P1/2t: 308.5 msec  LV V1 max P.1 mmHg  LV V1 max: 133.6 cm/sec  LV V1 VTI: 26.0 cm     SV(LVOT): 75.5 ml  SI(LVOT): 50.6 ml/m2  PA acc time: 0.10 sec  TR max gabriel: 304.3 cm/sec  TR max P.0 mmHg  AV Gabriel Ratio (DI): 0.37  IVÁN Index (cm2/m2): 0.66  E/E' av.5  Lateral E/e': 5.7  Medial E/e': 17.2  RV S Gabriel: 14.1 cm/sec     ______________________________________________________________________________  Report approved by: Ruben Whitman 2024 10:42 AM             Echo:  No results found for this or any previous visit (from the past 4320 hour(s)).    Clinically Significant Risk Factors Present on Admission          # Hypocalcemia: Lowest Ca = 8.4 mg/dL in last 2 days, will monitor and replace as appropriate           # Acute Respiratory Failure: based on blood gas results.  Continue supplemental oxygen as needed

## 2024-02-16 NOTE — H&P
[facility]    History and Physical - Hospitalist Service       Date of Admission:  2/16/2024    Assessment & Plan      Teresa Krishna is a 89 year old female with past medical history significant for HTN, HLD admitted on 2/16/2024 with acute hypoxic, hypercapnic respiratory failure.     **Of note, the patient apparently has another chart but at this time she is not marked for merge. Registration is working on this. Some history may be missing from this H&P.     Acute Systolic Congestive heart failure, new diagnosis   Moderate to severe aortic stenosis   Acute Hypoxic Hypercapnic Respiratory failure   Respiratory Acidosis   Elevated Lactic Acid   Pt presented to the ED with severe respiratory distress. Per report she has been having increasing shortness of breath over the past several months. Today her respiratory status worsened significantly. She called EMS. Per report she was laying on the floor, minimally responsive. Initial sats in the 70s. Respiratory status improved markedly on BiPAP.   * Initial labs notable for pH of 7.08, pCO2 of 79, and Lactate of 6.4.   * BNP also elevated to 15,783.   * CXR with mild to moderate cardiomegaly with small bilateral pleural effusions and pulmonary edema. No confluent airspace consolidation or pneumothorax.   * COVID, flu, RSV negative.   * On my examination, pt is breathing comfortably on 4L via Oxymask   * TTE was obtained and showed LVEF of 32%. Diastolic doppler findings suggest increased LV filling pressures. There is also moderate to severe aortic stenosis.   - Admit to inpatient   - Continuous oximetry, supplemental O2 as needed   - Telemetry   - Given 20mg Lasix in the ED, continue with 20mg IV lasix BID  - I/Os, daily weights   - Trend Lactate   - Stop antibiotics   - Cardiology consult   - Defer CHF work-up and initiation of ACEi and BB to cardiology.     HTN  - Hold PTA amlodipine to allow initiation of ACEi/BB per cardiology.      Depression/Anxiety   - Continued PTA bupropion and citalopram     Diet: Combination Diet 2 gm NA Diet; No Caffeine Diet (and additional linked orders)  Fluid restriction 2000 ML FLUID (and additional linked orders)  DVT Prophylaxis: Pneumatic Compression Devices  Marroquin Catheter: Not present  Lines: None     Cardiac Monitoring: None  Code Status:DNR/DNI    Clinically Significant Risk Factors Present on Admission          # Hypocalcemia: Lowest Ca = 8.4 mg/dL in last 2 days, will monitor and replace as appropriate           # Acute Respiratory Failure: based on blood gas results.  Continue supplemental oxygen as needed                Disposition Plan      Expected Discharge Date: 02/18/2024                  Yoana Guy MD  Hospitalist Service  [facility]  Securely message with [employer] (more info)  Text page via [employer] Paging/Directory     ______________________________________________________________________    Chief Complaint   Shortness of breath     History is obtained from the patient's daughters    History of Present Illness   Teresa Krishna is a 89 year old female who presents to the ED with acute respiratory distress. Per her family she has been having increasing issues with shortness of breath over the past few months. She has reportedly been seeing her doctor for this but has not had any cardiac work up. She does not have any hx of known cardiac problems.       Past Medical History    No past medical history on file.    Past Surgical History   No past surgical history on file.    Prior to Admission Medications   Prior to Admission Medications   Prescriptions Last Dose Informant Patient Reported? Taking?   acetaminophen (TYLENOL) 500 MG tablet prn Daughter Yes Yes   Sig: Take 500-1,000 mg by mouth every 6 hours as needed for mild pain   amLODIPine (NORVASC) 5 MG tablet 2/15/2024 at am Daughter Yes Yes   Sig: Take 5 mg by mouth every morning   amoxicillin (AMOXIL) 500 MG  capsule prn Daughter Yes Yes   Sig: Take 500 mg by mouth Prior to dental appointments   buPROPion (WELLBUTRIN) 100 MG tablet 2/15/2024 Daughter Yes Yes   Sig: Take 100 mg by mouth 2 times daily   citalopram (CELEXA) 20 MG tablet 2/15/2024 at am Daughter Yes Yes   Sig: Take 20 mg by mouth every morning   ezetimibe (ZETIA) 5 mg TABS half-tab 2/15/2024 at am Daughter Yes Yes   Sig: Take 5 mg by mouth every morning   gabapentin (NEURONTIN) 100 MG capsule prn Daughter Yes Yes   Sig: Take 100 mg by mouth nightly as needed for other (pain, insomnia)   multivitamin, therapeutic (THERA-VIT) TABS tablet 2/15/2024 Daughter Yes Yes   Sig: Take 1 tablet by mouth every evening   raloxifene (EVISTA) 60 MG tablet 2/15/2024 Daughter Yes Yes   Sig: Take 60 mg by mouth every evening      Facility-Administered Medications: None        Review of Systems    The 10 point Review of Systems is negative other than noted in the HPI or here.     Physical Exam   Vital Signs: Temp: (!) 96.5  F (35.8  C) Temp src: Temporal BP: 97/69 Pulse: 90   Resp: 22 SpO2: 99 % O2 Device: Aerosol mask Oxygen Delivery: 8 LPM  Weight: 118 lbs 2.66 oz    Constitutional: Awake, alert, cooperative, no apparent distress. Very [place]  Eyes: Conjunctiva and pupils examined and normal.  HEENT: Moist mucous membranes, normal dentition.  Respiratory: No increased work of breathing. Satting well on 4L of supplemental O2.   Cardiovascular: Regular rate and rhythm  GI: Soft, non-distended, non-tender, normal bowel sounds.  Skin: No rashes, no cyanosis, no edema.  Musculoskeletal: No joint swelling, erythema or tenderness.  Neurologic: Cranial nerves 2-12 intact, normal strength and sensation.  Psychiatric: Alert, oriented to person, place and time, no obvious anxiety or depression.    Medical Decision Making       75 MINUTES SPENT BY ME on the date of service doing chart review, history, exam, documentation & further activities per the note.      Data     I have personally  reviewed the following data over the past 24 hrs:    10.3  \   11.7   / 293     141 104 21.6 /  230 (H)   3.8 22 1.08 (H) \     ALT: 20 AST: 37 AP: 110 TBILI: 0.3   ALB: 3.8 TOT PROTEIN: 6.3 (L) LIPASE: N/A     Trop: 35 (H) BNP: 15,783 (H)     Procal: 0.08 CRP: N/A Lactic Acid: 1.5         Imaging results reviewed over the past 24 hrs:   Recent Results (from the past 24 hour(s))   XR Chest Port 1 View    Narrative    EXAM: XR CHEST PORT 1 VIEW  LOCATION: [facility]  DATE: 2024    INDICATION: shortness of breath  COMPARISON: None.      Impression    IMPRESSION: Mild to moderate cardiomegaly with small bilateral pleural effusions and pulmonary edema. No confluent airspace consolidation or pneumothorax.   Echocardiogram Complete   Result Value    Biplane LVEF 32%    Narrative    [id number]  [id number]  [id number]  [id number]^AYAH^BREEZY^BA     [facility]  Echocardiography Laboratory  [address] [place] [address]     Name: DESHAUN DEAL  MRN: [id number]  : 1930  Study Date: 2024 09:00 AM  Age: 89 yrs  Gender: Female  Patient Location: [facility]  Reason For Study: CHF  Ordering Physician: BREEZY POON  Referring Physician: RADHA PMD  Performed By: Tao Nagel RDCS     BSA: 1.5 m2  Height: 60 in  Weight: 118 lb  HR: 92  BP: 97/69 mmHg  ______________________________________________________________________________  Procedure  Complete Portable Echo Adult. Optison (NDC #[id number]) given intravenously.  ______________________________________________________________________________  Interpretation Summary     The left ventricle is normal in size.  Diastolic Doppler findings (E/E' ratio and/or other parameters) suggest left  ventricular filling pressures are increased.  Biplane LVEF is 32%.  Moderate to severe valvular aortic stenosis.  ______________________________________________________________________________  Left Ventricle  The left ventricle is  normal in size. A sigmoid septum is present. Diastolic  Doppler findings (E/E' ratio and/or other parameters) suggest left ventricular  filling pressures are increased. Biplane LVEF is 32%. Septal motion is  consistent with conduction abnormality.     Right Ventricle  The right ventricle is normal in size and function.     Atria  The left atrium is moderately dilated. Right atrial size is normal. There is  no color Doppler evidence of an atrial shunt.     Mitral Valve  The mitral valve leaflets are mildly thickened. There is mild (1+) mitral  regurgitation.     Tricuspid Valve  There is mild (1+) tricuspid regurgitation. The right ventricular systolic  pressure is approximated at 37.0 mmHg plus the right atrial pressure. IVC  diameter and respiratory changes fall into an intermediate range suggesting an  RA pressure of 8 mmHg.     Aortic Valve  There is mild (1+) aortic regurgitation. The mean AoV pressure gradient is  30.0 mmHg. Moderate to severe valvular aortic stenosis.     Pulmonic Valve  There is trace pulmonic valvular regurgitation.     Vessels  Normal size aorta. The aortic root is normal size.     Pericardium  There is no pericardial effusion.     Rhythm  Sinus rhythm was noted.  ______________________________________________________________________________  MMode/2D Measurements & Calculations  IVSd: 1.1 cm     LVIDd: 4.3 cm  LVIDs: 3.7 cm  LVPWd: 1.2 cm  FS: 14.2 %  LV mass(C)d: 177.1 grams  LV mass(C)dI: 118.8 grams/m2  Ao root diam: 2.8 cm  LA dimension: 3.6 cm  LA/Ao: 1.3  LVOT diam: 1.9 cm  LVOT area: 2.9 cm2  Ao root diam index Ht(cm/m): 1.8  Ao root diam index BSA (cm/m2): 1.9  RV Base: 3.6 cm  RWT: 0.58  TAPSE: 2.7 cm     Doppler Measurements & Calculations  MV E max prema: 67.4 cm/sec  MV A max prema: 178.9 cm/sec  MV E/A: 0.38  Ao V2 max: 365.5 cm/sec  Ao max P.4 mmHg  Ao V2 mean: 262.2 cm/sec  Ao mean P.0 mmHg  Ao V2 VTI: 76.2 cm  IVÁN(I,D): 0.99 cm2  IVÁN(V,D): 1.1 cm2  AI P1/2t: 308.5  msec  LV V1 max P.1 mmHg  LV V1 max: 133.6 cm/sec  LV V1 VTI: 26.0 cm     SV(LVOT): 75.5 ml  SI(LVOT): 50.6 ml/m2  PA acc time: 0.10 sec  TR max gabriel: 304.3 cm/sec  TR max P.0 mmHg  AV Gabriel Ratio (DI): 0.37  IVÁN Index (cm2/m2): 0.66  E/E' av.5  Lateral E/e': 5.7  Medial E/e': 17.2  RV S Gabriel: 14.1 cm/sec     ______________________________________________________________________________  Report approved by: Ruben Whitman 2024 10:42 AM

## 2024-02-16 NOTE — CONSULTS
REASON FOR ASSESSMENT:  CHF Consult for 2 gm NA Diet Education    NUTRITION HISTORY:    Information obtained from:  Pt and family in room     Living situation:   Home    Grocery shopping:  Pt goes with family    Meal preparation:  Pt and family often brings food    Breakfast:  Oatmeal with cranberries    Lunch:  Progresso soup     Dinner:   Leftover or whatever family brings     Previous diet instructions:  None      CURRENT DIET:  2 gm Na+, No caffeine, 2000 ml Fluid restriction    NUTRITION DIAGNOSIS:  Food- and nutrition-related knowledge deficit R/t no previous low sodium diet education in the past AEB pt report       INTERVENTIONS:    Nutrition Prescription:  Follow a low sodium diet upon discharge     Implementation:  Assessed learning needs, learning preferences, and willingness to learn  Nutrition Education (Content):  Provided handouts:  Tips for Low Na Diet  Label Reading  Low Na Foods/Drinks  Seasoning Your Food Without Salt  Low Na Recipe Booklet  Discussed rational for limiting Na for CHF and stressed importance of following 2 gm Na guidelines   Encouraged patient to keep a daily food record  Nutrition Education (Application):  Discussed current eating habits and recommended alternative food choices  Anticipated good compliance  Diet Education - refer to Education Flowsheet    Goals:  Patient family verbalizes understanding of diet by restating the importance of reading food labels, especially the serving size, and describing changes they can make to grocery shopping  All of the above goals met during the education session    Follow Up:  Provided RD contact information for future questions.  Recommend Out-Patient Nutrition Referral, if further diet instructions are needed.

## 2024-02-16 NOTE — ED PROVIDER NOTES
History     Chief Complaint:  Respiratory Distress    The history is provided by the patient.     Teresa Krishna is a 89 year old female with history of CAD, hypertension, and hyperlipidemia presenting via EMS for evaluation of respiratory distress. Teresa states that she called EMS this morning due to being unable to breathe, noting ongoing shortness of breath for several months. She also reports a mild cough recently. She denies fevers or any pain. She denies a history of heart failure.    [facility] [phone]      Independent Historian:   None - Patient Only    Review of External Notes:   N/A    Medications:    Norvasc  Wellbutrin  Celexa  Zetia  Neurontin  Evista  Lipitor    Past Medical History:    CAD  Osteoarthrosis  Mild dilatation of ascending aorta  Hypertension  Hyperlipidemia  Depressive disorder    Past Surgical History:    SHIKHA, left   Carpal tunnel release  Exploratory lap  Vein stripping  Knee replacement  Biopsy of breast, open incisional    Physical Exam   Patient Vitals for the past 24 hrs:   BP Temp Temp src Pulse Resp SpO2 Weight   02/16/24 0756 -- 97.8  F (36.6  C) Oral -- -- -- --   02/16/24 0753 -- -- -- -- -- 96 % --   02/16/24 0752 123/70 -- -- 98 -- -- --   02/16/24 0715 97/69 -- -- 90 22 99 % --   02/16/24 0704 97/66 -- -- 86 23 98 % --   02/16/24 0650 110/74 -- -- 92 23 97 % --   02/16/24 0645 -- -- -- 92 22 96 % --   02/16/24 0630 117/76 -- -- 90 26 96 % --   02/16/24 0625 110/79 -- -- 92 24 96 % --   02/16/24 0620 (!) 122/94 -- -- 92 26 96 % --   02/16/24 0615 118/85 -- -- 94 27 97 % --   02/16/24 0610 (!) 132/109 -- -- 104 (!) 33 97 % --   02/16/24 0605 (!) 148/101 -- -- 109 15 99 % --   02/16/24 0602 (!) 149/101 (!) 96.5  F (35.8  C) Temporal 112 21 100 % --   02/16/24 0600 (!) 149/101 -- -- 114 (!) 31 100 % --   02/16/24 0559 -- -- -- -- -- 100 % 53.6 kg (118 lb 2.7 oz)   02/16/24 0546 (!) 135/98 -- -- (!) 121 (!) 35 -- --     Physical Exam  General: Alert and cooperative with exam.  Patient in moderate distress. Normal mentation. Hard of hearing.  Head:  Scalp is NC/AT  Eyes:  No scleral icterus, PERRL  ENT:  The external nose and ears are normal.   Neck:  Normal range of motion without rigidity.  CV:  Mildly tachycardic rate and regular rhythm  Resp:  Mild coarse lung sounds bilaterally    Tachypneic    On BiPAP  GI:  Abdomen is soft, no distension, no tenderness. No peritoneal signs  MS:  No lower extremity edema   Skin:  Warm and dry, No rash or lesions noted.  Neuro: Oriented x 3. No gross motor deficits.    Emergency Department Course   ECG  ECG taken at 0539, ECG read at 0552  Sinus tachycardia  Left axis deviation  Left bundle branch block  Abnormal ECG   Rate 123 bpm. [place] interval 152 ms. QRS duration 158 ms. QT/QTc 378/541 ms. P-R-T axes -27 -50 109.     Imaging:  XR Chest Port 1 View   Final Result   IMPRESSION: Mild to moderate cardiomegaly with small bilateral pleural effusions and pulmonary edema. No confluent airspace consolidation or pneumothorax.      Echocardiogram Complete    (Results Pending)     Laboratory:  Labs Ordered and Resulted from Time of ED Arrival to Time of ED Departure   COMPREHENSIVE METABOLIC PANEL - Abnormal       Result Value    Sodium 141      Potassium 3.8      Carbon Dioxide (CO2) 22      Anion Gap 15      Urea Nitrogen 21.6      Creatinine 1.08 (*)     GFR Estimate 48 (*)     Calcium 8.4      Chloride 104      Glucose 230 (*)     Alkaline Phosphatase 110      AST 37      ALT 20      Protein Total 6.3 (*)     Albumin 3.8      Bilirubin Total 0.3     TROPONIN T, HIGH SENSITIVITY - Abnormal    Troponin T, High Sensitivity 35 (*)    NT PROBNP INPATIENT - Abnormal    N terminal Pro BNP Inpatient 15,783 (*)    ISTAT GASES LACTATE VENOUS POCT - Abnormal    Lactic Acid POCT 6.4 (*)     Bicarbonate Venous POCT 23      O2 Sat, Venous POCT 73      pCO2 Venous POCT 79 (*)     pH Venous POCT 7.08 (*)     pO2 Venous POCT 55 (*)    CBC WITH PLATELETS AND DIFFERENTIAL -  Abnormal    WBC Count 10.3      RBC Count 3.97      Hemoglobin 11.7      Hematocrit 37.2      MCV 94      MCH 29.5      MCHC 31.5      RDW 15.2 (*)     Platelet Count 293      % Neutrophils        % Lymphocytes        % Monocytes        % Eosinophils        % Basophils        % Immature Granulocytes        NRBCs per 100 WBC 0      Absolute Neutrophils        Absolute Lymphocytes        Absolute Monocytes        Absolute Eosinophils        Absolute Basophils        Absolute Immature Granulocytes        Absolute NRBCs 0.0     ISTAT GASES ELECTROLYTES VENOUS POCT - Abnormal    CPB Applied No      Hematocrit POCT 38      Bicarbonate Venous POCT 23      Hemoglobin POCT 12.9      Potassium POCT 4.2      Sodium POCT 141      pCO2 Venous POCT 79 (*)     pH Venous POCT 7.08 (*)     pO2 Venous POCT 54 (*)     O2 Sat, Venous POCT 72     ROUTINE UA WITH MICROSCOPIC REFLEX TO CULTURE - Abnormal    Color Urine Light Yellow      Appearance Urine Clear      Glucose Urine Negative      Bilirubin Urine Negative      Ketones Urine Negative      Specific Gravity Urine 1.026      Blood Urine Negative      pH Urine 5.5      Protein Albumin Urine 30 (*)     Urobilinogen Urine Normal      Nitrite Urine Negative      Leukocyte Esterase Urine Negative      Bacteria Urine Few (*)     Mucus Urine Present (*)     RBC Urine 11 (*)     WBC Urine 2      Hyaline Casts Urine 3 (*)    ISTAT GASES LACTATE VENOUS POCT - Abnormal    Lactic Acid POCT 3.0 (*)     Bicarbonate Venous POCT 24      O2 Sat, Venous POCT 58 (*)     pCO2 Venous POCT 55 (*)     pH Venous POCT 7.25 (*)     pO2 Venous POCT 36     DIFFERENTIAL - Abnormal    % Neutrophils 27      % Lymphocytes 64      % Monocytes 5      % Eosinophils 4      % Basophils 0      Absolute Neutrophils 2.8      Absolute Lymphocytes 6.6 (*)     Absolute Monocytes 0.5      Absolute Eosinophils 0.4      Absolute Basophils 0.0      RBC Morphology Confirmed RBC Indices      Platelet Assessment        Value:  Automated Count Confirmed. Platelet morphology is normal.   INFLUENZA A/B, RSV, & SARS-COV2 PCR - Normal    Influenza A PCR Negative      Influenza B PCR Negative      RSV PCR Negative      SARS CoV2 PCR Negative     LACTIC ACID WHOLE BLOOD   PROCALCITONIN   BLOOD CULTURE   BLOOD CULTURE     Emergency Department Course & Assessments:       Interventions:  Medications   furosemide (LASIX) injection 20 mg (has no administration in time range)   ipratropium - albuterol 0.5 mg/2.5 mg/3 mL (DUONEB) neb solution 3 mL (3 mLs Nebulization $Given 2/16/24 0558)   ipratropium - albuterol 0.5 mg/2.5 mg/3 mL (DUONEB) neb solution 3 mL (3 mLs Nebulization $Given 2/16/24 0558)   piperacillin-tazobactam (ZOSYN) 4.5 g vial to attach to  mL bag (0 g Intravenous Stopped 2/16/24 0638)     Assessments:  0604 I obtained history and examined the patient as noted above.   0635 I rechecked the patient. I discussed findings and admission with the patient. All questions answered.    Independent Interpretation (X-rays, CTs, rhythm strip):  I reviewed the patient's chest X-ray and note cardiomegaly and pulmonary edema.    Consultations/Discussion of Management or Tests:  ED Course as of 02/16/24 0809   Fri Feb 16, 2024   0730 I spoke with Dr. Guy of the hospitalist team regarding the patient, who accepted the patient for admission.     Social Determinants of Health affecting care:   None    Disposition:  The patient was admitted to the hospital under the care of Dr. Guy.    Impression & Plan        Medical Decision Making:  Teresa Krishna is a 89 year old female with a PMH of CAD, hypertension, and hyperlipidemia who presents for evaluation of shortness of breath.  On initial assessment patient was seen in the stabilization room due to hypoxia, increased work of breathing, and somnolence.  Patient was immediately placed on BiPAP.  I considered a broad differential of her dyspnea including CHF exacerbation, PE, dissection,  hemothorax, pleural effusion, pneumonia, acute coronary syndrome, reactive airway disease, bronchitis, upper airway obstruction, foreign body, etc. EKG demonstrates sinus tachycardia with left bundle branch block; no previous for comparison.  Patient denies chest pain.  Troponin noted to be mildly elevated (likely demand ischemia from hypoxia).  Lactic acid initially significant elevated (6.4; likely secondary to hypoxia the patient was provided empiric dose of Zosyn after blood cultures obtained).  Labs also notable for significant elevated BNP (15,783), elevated pCO2 and acidosis on VBG, and UA without evidence of infection.   COVID/influenza/RSV testing negative.  Repeat VBG demonstrates significant clinical improvement.  Chest x-ray shows cardiomegaly with evidence of pulmonary edema.  Presentation most consistent with acute/decompensated heart failure.  Patient did receive 20 mg IV Lasix though does not appear to be significantly hypervolemic on exam. Later patient was able to be transitioned off of BiPAP.  Patient's care was discussed with the hospitalist team who accepted for admission.      Critical Care time was 40 minutes for this patient excluding procedures.    Diagnosis:    ICD-10-CM    1. Acute respiratory failure with hypoxia and hypercapnia (H)  J96.01     J96.02       2. Acute pulmonary edema (H)  J81.0       3. SOB (shortness of breath)  R06.02         Scribe Disclosure:  Eriberto THORPE, am serving as a scribe at 6:12 AM on 2/16/2024 to document services personally performed by Mayo Epperson DO based on my observations and the provider's statements to me.     2/16/2024   Mayo Epperson DO O'Neill, Christopher Warren, DO  02/16/24 1724

## 2024-02-17 ENCOUNTER — APPOINTMENT (OUTPATIENT)
Dept: OCCUPATIONAL THERAPY | Facility: CLINIC | Age: 89
DRG: 280 | End: 2024-02-17
Attending: STUDENT IN AN ORGANIZED HEALTH CARE EDUCATION/TRAINING PROGRAM
Payer: MEDICARE

## 2024-02-17 LAB
ANION GAP SERPL CALCULATED.3IONS-SCNC: 10 MMOL/L (ref 7–15)
BUN SERPL-MCNC: 30.1 MG/DL (ref 8–23)
CALCIUM SERPL-MCNC: 8.8 MG/DL (ref 8.2–9.6)
CHLORIDE SERPL-SCNC: 104 MMOL/L (ref 98–107)
CREAT SERPL-MCNC: 1.24 MG/DL (ref 0.51–0.95)
DEPRECATED HCO3 PLAS-SCNC: 27 MMOL/L (ref 22–29)
EGFRCR SERPLBLD CKD-EPI 2021: 40 ML/MIN/1.73M2
ERYTHROCYTE [DISTWIDTH] IN BLOOD BY AUTOMATED COUNT: 15.3 % (ref 10–15)
GLUCOSE SERPL-MCNC: 100 MG/DL (ref 70–99)
HCT VFR BLD AUTO: 33.8 % (ref 35–47)
HGB BLD-MCNC: 11.3 G/DL (ref 11.7–15.7)
MAGNESIUM SERPL-MCNC: 2.4 MG/DL (ref 1.7–2.3)
MCH RBC QN AUTO: 30 PG (ref 26.5–33)
MCHC RBC AUTO-ENTMCNC: 33.4 G/DL (ref 31.5–36.5)
MCV RBC AUTO: 90 FL (ref 78–100)
PHOSPHATE SERPL-MCNC: 4.5 MG/DL (ref 2.5–4.5)
PLATELET # BLD AUTO: 245 10E3/UL (ref 150–450)
POTASSIUM SERPL-SCNC: 4 MMOL/L (ref 3.4–5.3)
RBC # BLD AUTO: 3.77 10E6/UL (ref 3.8–5.2)
SODIUM SERPL-SCNC: 141 MMOL/L (ref 135–145)
WBC # BLD AUTO: 6.6 10E3/UL (ref 4–11)

## 2024-02-17 PROCEDURE — 84100 ASSAY OF PHOSPHORUS: CPT | Performed by: INTERNAL MEDICINE

## 2024-02-17 PROCEDURE — 250N000013 HC RX MED GY IP 250 OP 250 PS 637: Performed by: INTERNAL MEDICINE

## 2024-02-17 PROCEDURE — 120N000001 HC R&B MED SURG/OB

## 2024-02-17 PROCEDURE — 97165 OT EVAL LOW COMPLEX 30 MIN: CPT | Mod: GO

## 2024-02-17 PROCEDURE — 80048 BASIC METABOLIC PNL TOTAL CA: CPT | Performed by: INTERNAL MEDICINE

## 2024-02-17 PROCEDURE — 99232 SBSQ HOSP IP/OBS MODERATE 35: CPT | Performed by: INTERNAL MEDICINE

## 2024-02-17 PROCEDURE — 97530 THERAPEUTIC ACTIVITIES: CPT | Mod: GO

## 2024-02-17 PROCEDURE — 85027 COMPLETE CBC AUTOMATED: CPT | Performed by: STUDENT IN AN ORGANIZED HEALTH CARE EDUCATION/TRAINING PROGRAM

## 2024-02-17 PROCEDURE — 83735 ASSAY OF MAGNESIUM: CPT | Performed by: INTERNAL MEDICINE

## 2024-02-17 PROCEDURE — 250N000013 HC RX MED GY IP 250 OP 250 PS 637: Performed by: STUDENT IN AN ORGANIZED HEALTH CARE EDUCATION/TRAINING PROGRAM

## 2024-02-17 PROCEDURE — 97535 SELF CARE MNGMENT TRAINING: CPT | Mod: GO

## 2024-02-17 PROCEDURE — 36415 COLL VENOUS BLD VENIPUNCTURE: CPT | Performed by: INTERNAL MEDICINE

## 2024-02-17 RX ORDER — LISINOPRIL 2.5 MG/1
2.5 TABLET ORAL DAILY
Status: DISCONTINUED | OUTPATIENT
Start: 2024-02-17 | End: 2024-02-20

## 2024-02-17 RX ORDER — FUROSEMIDE 20 MG
20 TABLET ORAL DAILY
Status: DISCONTINUED | OUTPATIENT
Start: 2024-02-17 | End: 2024-02-23 | Stop reason: HOSPADM

## 2024-02-17 RX ADMIN — GABAPENTIN 100 MG: 100 CAPSULE ORAL at 20:40

## 2024-02-17 RX ADMIN — CITALOPRAM HYDROBROMIDE 20 MG: 20 TABLET ORAL at 09:58

## 2024-02-17 RX ADMIN — BUPROPION HYDROCHLORIDE 100 MG: 100 TABLET, FILM COATED ORAL at 20:40

## 2024-02-17 RX ADMIN — LISINOPRIL 2.5 MG: 2.5 TABLET ORAL at 12:03

## 2024-02-17 RX ADMIN — METOPROLOL TARTRATE 12.5 MG: 25 TABLET, FILM COATED ORAL at 20:40

## 2024-02-17 RX ADMIN — EZETIMIBE 5 MG: 10 TABLET ORAL at 09:58

## 2024-02-17 RX ADMIN — BUPROPION HYDROCHLORIDE 100 MG: 100 TABLET, FILM COATED ORAL at 09:58

## 2024-02-17 RX ADMIN — FUROSEMIDE 20 MG: 20 TABLET ORAL at 12:03

## 2024-02-17 RX ADMIN — METOPROLOL TARTRATE 12.5 MG: 25 TABLET, FILM COATED ORAL at 12:03

## 2024-02-17 ASSESSMENT — ACTIVITIES OF DAILY LIVING (ADL)
ADLS_ACUITY_SCORE: 34
PREVIOUS_RESPONSIBILITIES: MEAL PREP;LAUNDRY;MEDICATION MANAGEMENT;FINANCES
ADLS_ACUITY_SCORE: 34

## 2024-02-17 NOTE — PROGRESS NOTES
02/17/24 1000   Appointment Info   Signing Clinician's Name / Credentials (OT) Kimberly North, OTR/L   Living Environment   People in Home alone   Current Living Arrangements house   Home Accessibility no concerns   Transportation Anticipated family or friend will provide   Living Environment Comments Meets all needs on one level. Has 6 involved children. Ind with ADLs at baseline, no AD (has walker her spouse used). Has walk in shower with grab bar and shower chair. Retired RN.   Self-Care   Usual Activity Tolerance good   Current Activity Tolerance moderate   Equipment Currently Used at Home walker, standard;shower chair;grab bar, tub/shower   Fall history within last six months yes   Number of times patient has fallen within last six months 1  (Pt found on floor by EMS)   Activity/Exercise/Self-Care Comment ind with ADLs, no AD   Instrumental Activities of Daily Living (IADL)   Previous Responsibilities meal prep;laundry;medication management;finances   IADL Comments Sons/daughters assist with yardwork. Granddaughter cleans every two weeks but pt can do light cleaning. Family completes shopping for pt. Son in law sometimes assists with finances. Does not drive.   General Information   Onset of Illness/Injury or Date of Surgery 02/16/24   Referring Physician Yoana Guy MD   Patient/Family Therapy Goal Statement (OT) not stated   Additional Occupational Profile Info/Pertinent History of Current Problem Teresa Krishna is a 89 year old female with past medical history significant for HTN, HLD admitted on 2/16/2024 with acute hypoxic, hypercapnic respiratory failure.   Existing Precautions/Restrictions fall   Left Upper Extremity (Weight-bearing Status) full weight-bearing (FWB)   Right Upper Extremity (Weight-bearing Status) full weight-bearing (FWB)   Left Lower Extremity (Weight-bearing Status) full weight-bearing (FWB)   Right Lower Extremity (Weight-bearing Status) full weight-bearing (FWB)   General  Observations and Info [place] despite use of hearing aid   Cognitive Status Examination   Orientation Status orientation to person, place and time   Cognitive Status Comments Talkative, positive, and pleasant. Cog WFL for age, however, may have mild cog deficits. Will cont to assess.   Visual Perception   Impact of Vision Impairment on Function (Vision) Full time glasses   Sensory   Sensory Quick Adds sensation intact   Pain Assessment   Patient Currently in Pain No   Posture   Posture not impaired   Range of Motion Comprehensive   Comment, General Range of Motion WNL   Strength Comprehensive (MMT)   Comment, General Manual Muscle Testing (MMT) Assessment WNL   Muscle Tone Assessment   Muscle Tone Comments WNL   Coordination   Coordination Comments WNL   Bed Mobility   Comment (Bed Mobility) not assessed   Transfers   Transfer Comments STS SBA, no AD   Balance   Balance Comments mild unsteadiness, recommended pt use AD at discharge (has walker from her spouse who has passed away)   Activities of Daily Living   BADL Assessment/Intervention bathing   Additional Documentation Comment, IADL Assessment/Training (Row)  (household management (cooking))   Bathing Assessment/Intervention   [place] Level (Bathing) modified independence;supervision   Comment, (Bathing) Per clinical judgment - would require EC   Clinical Impression   Criteria for Skilled Therapeutic Interventions Met (OT) Yes, treatment indicated   OT Diagnosis Pt presents with SOB/decreased activity tolerance, which pt has had for several months, impacting endurance with I/ADLs and functional mobility.   OT Problem List-Impairments impacting ADL problems related to;activity tolerance impaired;balance;cognition;mobility   Assessment of Occupational Performance 1-3 Performance Deficits   Identified Performance Deficits bathing, household ambulation, cooking   Planned Therapy Interventions (OT) ADL retraining;IADL retraining;balance  training;cognition;strengthening;transfer training;visual perception;home program guidelines;progressive activity/exercise;risk factor education;other (see comments)  (CHF ed)   Clinical Decision Making Complexity (OT) problem focused assessment/low complexity   Risk & Benefits of therapy have been explained evaluation/treatment results reviewed;care plan/treatment goals reviewed;risks/benefits reviewed;current/potential barriers reviewed;participants voiced agreement with care plan;participants included;patient   Clinical Impression Comments Pt presents with SOB/decreased activity tolerance, which pt has had for several months, impacting endurance with I/ADLs and functional mobility. Would benefit from skilled IP OT to progress safety and ind with I/ADLs and to rec safe discharge.   OT Total Evaluation Time   OT Eval, Low Complexity Minutes (39806) 15   OT Goals   Therapy Frequency (OT) Daily   OT Predicted Duration/Target Date for Goal Attainment 03/02/24   OT Goals Home Management;Cardiac Phase 1;Cognition;OT Goal 1;OT Goal 2   OT: Home Management Modified independent;Supervision/stand-by assist;with light demand household tasks   OT: Cognitive Patient/caregiver will verbalize understanding of cognitive assessment results/recommendations as needed for safe discharge planning   OT: Understanding of cardiac education to maximize quality of life, condition management, and health outcomes Patient;Verbalize;Caregiver;Demonstrate   OT: Perform aerobic activity with stable cardiovascular response continuous;5 minutes;ambulation   OT: Goal 1 Pt will participate in full ADL routine (g/h, toileting, FB dressing with retreival) for at minimum 10 mins with stable cardiovascular response.   OT: Goal 2 Pt will verbalize understanding of EC for increased safety completing I/ADLs and managing SOB.   Interventions   Interventions Quick Adds Self-Care/Home Management;Therapeutic Activity;Cardiac Rehab   Self-Care/Home Management    Self-Care/Home Mgmt/ADL, Compensatory, Meal Prep Minutes (13275) 10   Treatment Detail/Skilled Intervention Pt greeted for OT eval/treat this date. Edu provided on role. Pt [place] requiring repeat of directions throughout session. Pt provided with CHF edu packet. Reviewed edu listed below. 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 Pt may benefit from review of edu to enhance understanding, as pt having tangential speech during edu and was distractable.   Therapeutic Activities   Therapeutic Activity Minutes (01122) 78   Symptoms noted during/after treatment shortness of breath;increase work of breath;significant change in vital signs   Treatment Detail/Skilled Intervention OT engaged pt in functional ambulation to enhance endurance necessary for I/ADLs. Pt tolerated approx 3 mins cont activity with SBA - CGA, no AD. Unsteadiness noted, pt recommended to use walker at home for increased safety. SOB observed and endorsed by pt at end of walk. Instructed to sit. BP and O2 sats stable pre and post on RA, however, HR elevated. Recovery with rest. Educated on discharge rec and pt believes family can stay with her following discharge.   Ambulation   Duration (minutes) 3 minutes   Effort Scale 5   Symptoms Dyspnea   Cardiovascular Response Tachycardia   Exercise Details see above   Vital Signs Details RA O2 sats between 90-95%, -130 bpm   Cardiac Education   Education Provided Diet;Stop light tool;Energy conservation;OMNI Scale;Signs and symptoms   Education Packet Given to Patient Yes   OT Discharge Planning   OT Plan Progress ambulation. Review CHF information, as needed. ADL routine to assess endurance.   OT Discharge Recommendation (DC Rec) home with assist;home with home care occupational therapy   OT Rationale for DC Rec Pt ind with no AD at baseline. Lives alone, but has close support from 6 adult children. Ambulated 3 mins cont with SBA-CGA and no AD. SOB noted. Anticipate with  med management, pt safe to return home with assistance from family (pt reported family may be able to stay with her - encouraged to talk with family) and HH OT for ADL endurance and safety.   OT Brief overview of current status SBA/CGA, no AD (however, walker recommended). Decreased activity tolerance for long distances.   OT Equipment Needed at Discharge   (Has walker and shower chair. Grab bar in shower. All needs met.)   Total Session Time   Timed Code Treatment Minutes 20   Total Session Time (sum of timed and untimed services) 35

## 2024-02-17 NOTE — PLAN OF CARE
Goal Outcome Evaluation:      Plan of Care Reviewed With: patient    Summary: Resp Failure,   DATE & TIME: 2/16/24 7630-5079   Cognitive Concerns/ Orientation : AOx4   BEHAVIOR & AGGRESSION TOOL COLOR: Green   ABNL VS/O2: VSS on 4L   MOBILITY: SBA  PAIN MANAGMENT: Denied  DIET: 2gm Na+; 2000 Fluid restriction, No Caffeine   BOWEL/BLADDER: Pt not voiding; Following void protocol;  straight cathed at 1248 w/ 700 out. Bladder scanned at 1850 w/ 561- will need to be straight cathed by oncoming RN   ABNL LAB/BG: Trop 74  DRAIN/DEVICES: 2 PIV SL, External cath placed- on strict I/Os  TELEMETRY RHYTHM: BBB  SKIN: WDL x Scattered bruise  TESTS/PROCEDURES: NA  D/C DAY/GOALS/PLACE: Pending  OTHER IMPORTANT INFO: Family at bedside most of the day

## 2024-02-17 NOTE — PROGRESS NOTES
[facility]    Cardiology Consultation - Progress Note     Date of Admission:  2/16/2024  Date of Service: 02/17/2024    Reason for Consult   Reason for consult: new heart failure    History of Present Illness   Teresa Krishna is a 89 year old female who was admitted on 2/16/2024 for acute hypoxic respiratory failure. Medical comorbidities include hypertension, hyperlipidemia, and coronary atherosclerosis based on prior CT calcium score.    She was previously followed in the [facility] system.  She is independent and lives alone.  Echocardiogram from 2011 showed preserved LV function.  Her last electrocardiogram from 2022 showed normal sinus rhythm and normal QRS.     She reports dyspnea on exertion over the past 1 year.  Her symptoms have been worsening over the last several months. Initial evaluation here showed significantly elevated NT proBNP and chest imaging consistent with congestive heart failure. Her electrocardiogram shows sinus tachycardia and left bundle branch block.  The chronicity of the LBBB is unclear but it is new since 2022.  She denies history of chest pain.  She also underwent transthoracic echocardiogram which demonstrated moderate LV systolic dysfunction (EF 32%) and septal motion abnormality consistent with conduction disease.    Interval history: her breathing has improved with diuresis, she has had no chest pain    Assessment & Plan   Acute heart failure with reduced ejection fraction (LVEF 32%)  Left bundle branch block, presumably chronic  Trivial troponin elevation without significant delta, likely demand related  Hypertension  Moderate to severe aortic stenosis    Plan:  Transitioned to PO Lasix 20 mg daily today  Continue ezetimibe 5 mg daily  Continue lisinopril 2.5 mg daily, metoprolol tartrate 12.5 mg BID, uptitrate as able  Consider spironolactone and SGLT-2 inhibitor if able  PTA amlodipine discontinued  Will need to rule out ischemia with stress test or  coronary angiogram after the weekend. I discussed these options with her and she is leaning towards coronary angiogram, but wants to discuss this more tomorrow when her daughters are present. We will make a final decision then.  The aortic stenosis is in the moderate range by gradient, the stroke-volume index doesn't suggest low flow-low gradient AS    Jesus Johnson MD    Primary Care Physician   Physician No Ref-Primary    Patient Active Problem List   Diagnosis    Acute pulmonary edema (H)    SOB (shortness of breath)    Acute respiratory failure with hypoxia and hypercapnia (H)       Past Medical History   I have reviewed this patient's medical history and updated it with pertinent information if needed.   No past medical history on file.    Past Surgical History   I have reviewed this patient's surgical history and updated it with pertinent information if needed.  No past surgical history on file.    Prior to Admission Medications   Prior to Admission Medications   Prescriptions Last Dose Informant Patient Reported? Taking?   acetaminophen (TYLENOL) 500 MG tablet prn Daughter Yes Yes   Sig: Take 500-1,000 mg by mouth every 6 hours as needed for mild pain   amLODIPine (NORVASC) 5 MG tablet 2/15/2024 at am Daughter Yes Yes   Sig: Take 5 mg by mouth every morning   amoxicillin (AMOXIL) 500 MG capsule prn Daughter Yes Yes   Sig: Take 500 mg by mouth Prior to dental appointments   buPROPion (WELLBUTRIN) 100 MG tablet 2/15/2024 Daughter Yes Yes   Sig: Take 100 mg by mouth 2 times daily   citalopram (CELEXA) 20 MG tablet 2/15/2024 at am Daughter Yes Yes   Sig: Take 20 mg by mouth every morning   ezetimibe (ZETIA) 5 mg TABS half-tab 2/15/2024 at am Daughter Yes Yes   Sig: Take 5 mg by mouth every morning   gabapentin (NEURONTIN) 100 MG capsule prn Daughter Yes Yes   Sig: Take 100 mg by mouth nightly as needed for other (pain, insomnia)   multivitamin, therapeutic (THERA-VIT) TABS tablet 2/15/2024 Daughter Yes  Yes   Sig: Take 1 tablet by mouth every evening   raloxifene (EVISTA) 60 MG tablet 2/15/2024 Daughter Yes Yes   Sig: Take 60 mg by mouth every evening      Facility-Administered Medications: None     Current Facility-Administered Medications   Medication Dose Route Frequency    buPROPion  100 mg Oral BID    citalopram  20 mg Oral QAM    ezetimibe  5 mg Oral QAM    furosemide  20 mg Oral Daily    lisinopril  2.5 mg Oral Daily    metoprolol tartrate  12.5 mg Oral BID    sodium chloride (PF)  3 mL Intracatheter Q8H     Current Facility-Administered Medications   Medication Last Rate    - MEDICATION INSTRUCTIONS -      ACE/ARB/ARNI NOT PRESCRIBED      BETA BLOCKER NOT PRESCRIBED       Allergies   No Known Allergies    Social History        Family History   No family history on file.    Review of Systems   The comprehensive Review of Systems is negative other than noted in the HPI or here.     Physical Exam   Vital Signs with Ranges  Temp:  [97.7  F (36.5  C)-98.1  F (36.7  C)] 98  F (36.7  C)  Pulse:  [] 98  Resp:  [18-20] 18  BP: (106-130)/(68-91) 106/68  SpO2:  [95 %-98 %] 97 %  Wt Readings from Last 4 Encounters:   02/17/24 52.9 kg (116 lb 9.6 oz)     I/O last 3 completed shifts:  In: -   Out: 120 [Urine:120]      Vitals: /68   Pulse 98   Temp 98  F (36.7  C) (Oral)   Resp 18   Wt 52.9 kg (116 lb 9.6 oz)   SpO2 97%     General: Alert, oriented, in no acute distress  HEENT: PERRLA, mucous membranes moist  Neck: Normal JVP  CV: Regular rate and rhythm without murmur  Respiratory: Clear to auscultation bilaterally  GI: Normoactive bowel sounds; soft, non-tender abdomen  Neuro: No focal deficits appreciated  Extremities: No peripheral edema bilaterally    Recent Labs   Lab 02/17/24  1038 02/16/24  0554 02/16/24  0550   WBC 6.6 10.3  --    HGB 11.3* 11.7 12.9   MCV 90 94  --     293  --     141 141   POTASSIUM 4.0 3.8 4.2   CHLORIDE 104 104  --    CO2 27 22  --    BUN 30.1* 21.6  --    CR  "1.24* 1.08*  --    GFRESTIMATED 40* 48*  --    ANIONGAP 10 15  --    DANIELA 8.8 8.4  --    * 230*  --    ALBUMIN  --  3.8  --    PROTTOTAL  --  6.3*  --    BILITOTAL  --  0.3  --    ALKPHOS  --  110  --    ALT  --  20  --    AST  --  37  --      No results for input(s): \"CHOL\", \"HDL\", \"LDL\", \"TRIG\", \"CHOLHDLRATIO\" in the last 01591 hours.  Recent Labs   Lab 24  1038 24  0554 24  0550   WBC 6.6 10.3  --    HGB 11.3* 11.7 12.9   HCT 33.8* 37.2 38   MCV 90 94  --     293  --      Recent Labs   Lab 24  0650 24  0550   PHV 7.25* 7.08*  7.08*   PO2V 36 54*  55*   PCO2V 55* 79*  79*   HCO3V 24 23  23     Recent Labs   Lab 24  0554   NTBNPI 15,783*     No results for input(s): \"DD\" in the last 168 hours.  No results for input(s): \"SED\", \"CRP\" in the last 168 hours.  Recent Labs   Lab 24  1038 24  0554    293     No results for input(s): \"TSH\" in the last 168 hours.  Recent Labs   Lab 24  0742   COLOR Light Yellow   APPEARANCE Clear   URINEGLC Negative   URINEBILI Negative   URINEKETONE Negative   SG 1.026   UBLD Negative   URINEPH 5.5   PROTEIN 30*   NITRITE Negative   LEUKEST Negative   RBCU 11*   WBCU 2       Imaging:  Recent Results (from the past 48 hour(s))   XR Chest Port 1 View    Narrative    EXAM: XR CHEST PORT 1 VIEW  LOCATION: [facility]  DATE: 2024    INDICATION: shortness of breath  COMPARISON: None.      Impression    IMPRESSION: Mild to moderate cardiomegaly with small bilateral pleural effusions and pulmonary edema. No confluent airspace consolidation or pneumothorax.   Echocardiogram Complete   Result Value    Biplane LVEF 32%    Narrative    [id number]  [id number]  2010^AYAH^BREEZY^A     [facility]  Echocardiography Laboratory  [address] [place] [address]     Name: DESHAUN DEAL  MRN: [id number]  : 1930  Study Date: 2024 09:00 AM  Age: 89 " yrs  Gender: Female  Patient Location: [facility]  Reason For Study: CHF  Ordering Physician: BREEZY POON  Referring Physician: RADHA PMD  Performed By: Tao Nagel RDCS     BSA: 1.5 m2  Height: 60 in  Weight: 118 lb  HR: 92  BP: 97/69 mmHg  ______________________________________________________________________________  Procedure  Complete Portable Echo Adult. Optison (NDC #[id number]) given intravenously.  ______________________________________________________________________________  Interpretation Summary     The left ventricle is normal in size.  Diastolic Doppler findings (E/E' ratio and/or other parameters) suggest left  ventricular filling pressures are increased.  Biplane LVEF is 32%.  Moderate to severe valvular aortic stenosis.  ______________________________________________________________________________  Left Ventricle  The left ventricle is normal in size. A sigmoid septum is present. Diastolic  Doppler findings (E/E' ratio and/or other parameters) suggest left ventricular  filling pressures are increased. Biplane LVEF is 32%. Septal motion is  consistent with conduction abnormality.     Right Ventricle  The right ventricle is normal in size and function.     Atria  The left atrium is moderately dilated. Right atrial size is normal. There is  no color Doppler evidence of an atrial shunt.     Mitral Valve  The mitral valve leaflets are mildly thickened. There is mild (1+) mitral  regurgitation.     Tricuspid Valve  There is mild (1+) tricuspid regurgitation. The right ventricular systolic  pressure is approximated at 37.0 mmHg plus the right atrial pressure. IVC  diameter and respiratory changes fall into an intermediate range suggesting an  RA pressure of 8 mmHg.     Aortic Valve  There is mild (1+) aortic regurgitation. The mean AoV pressure gradient is  30.0 mmHg. Moderate to severe valvular aortic stenosis.     Pulmonic Valve  There is trace pulmonic valvular regurgitation.     Vessels  Normal size  aorta. The aortic root is normal size.     Pericardium  There is no pericardial effusion.     Rhythm  Sinus rhythm was noted.  ______________________________________________________________________________  MMode/2D Measurements & Calculations  IVSd: 1.1 cm     LVIDd: 4.3 cm  LVIDs: 3.7 cm  LVPWd: 1.2 cm  FS: 14.2 %  LV mass(C)d: 177.1 grams  LV mass(C)dI: 118.8 grams/m2  Ao root diam: 2.8 cm  LA dimension: 3.6 cm  LA/Ao: 1.3  LVOT diam: 1.9 cm  LVOT area: 2.9 cm2  Ao root diam index Ht(cm/m): 1.8  Ao root diam index BSA (cm/m2): 1.9  RV Base: 3.6 cm  RWT: 0.58  TAPSE: 2.7 cm     Doppler Measurements & Calculations  MV E max gabriel: 67.4 cm/sec  MV A max gabriel: 178.9 cm/sec  MV E/A: 0.38  Ao V2 max: 365.5 cm/sec  Ao max P.4 mmHg  Ao V2 mean: 262.2 cm/sec  Ao mean P.0 mmHg  Ao V2 VTI: 76.2 cm  IVÁN(I,D): 0.99 cm2  IVÁN(V,D): 1.1 cm2  AI P1/2t: 308.5 msec  LV V1 max P.1 mmHg  LV V1 max: 133.6 cm/sec  LV V1 VTI: 26.0 cm     SV(LVOT): 75.5 ml  SI(LVOT): 50.6 ml/m2  PA acc time: 0.10 sec  TR max gabriel: 304.3 cm/sec  TR max P.0 mmHg  AV Gabriel Ratio (DI): 0.37  IVÁN Index (cm2/m2): 0.66  E/E' av.5  Lateral E/e': 5.7  Medial E/e': 17.2  RV S Gabriel: 14.1 cm/sec     ______________________________________________________________________________  Report approved by: Ruben Whitman 2024 10:42 AM               Medical Decision Making       35 MINUTES SPENT BY ME on the date of service doing chart review, history, exam, documentation & further activities per the note.

## 2024-02-17 NOTE — PROGRESS NOTES
[facility]    Hospitalist Progress Note    Brief Summary:  This is a 89-year-old female with history of hypertension, hyperlipidemia, depression, here lungs presented with acute worsening shortness of breath and found to be in acute systolic congestive heart failure and respiratory distress started on BiPAP and is an diuretic and subsequently admitted.    Assessment & Plan        Acute Systolic Congestive heart failure, new diagnosis: Improved  Moderate to severe aortic stenosis   Acute Hypoxic Hypercapnic Respiratory failure: Resolved  Lactic acidosis: Resolved    Pt presented to the ED with severe respiratory distress. Per report she has been having increasing shortness of breath over the past several months.  On the day of admission her respiratory status worsened significantly. She called EMS. Per report she was laying on the floor, minimally responsive. Initial sats in the 70s. Respiratory status improved markedly on BiPAP.   * Initial labs notable for VBG pH of 7.08, pCO2 of 79, and Lactate of 6.4.   * BNP also elevated to 15,783.   * CXR with mild to moderate cardiomegaly with small bilateral pleural effusions and pulmonary edema. No confluent airspace consolidation or pneumothorax.   * COVID, flu, RSV negative.     * TTE was obtained and showed LVEF of 32%. Diastolic doppler findings suggest increased LV filling pressures. There is also moderate to severe aortic stenosis.     She was admitted, cardiology was consulted.  She was started on IV Lasix 60 mg twice daily, she responded very well to the treatment.  She is now on room air, breathing comfortably no sign of fluid overload at this time.  I will stop IV Lasix, as her creatinine already increased to 1.24 from 1.08.  No JVD, no basal crackles, no lower extremity edema, breathing comfortably at room air at this time on 2/17/2024.  I will start her on low-dose metoprolol 12.5 mg twice daily, lisinopril 2.5 mg daily, stop IV Lasix  and started on oral Lasix 20 mg daily.  Strict intake and output charting Daily weights,  Lactic acid is now normal.  Was elevated likely because of CHF and pulm edema  Appreciate input from the cardiology, moderate to severe aortic stenosis, not enough to do any intervention at this time  Will defer ischemic workup to the cardiology at this time.       HTN  -Discontinue amlodipine.  Started on low-dose metoprolol 12.5 mg twice daily and lisinopril 2.5 mg daily.  Will slowly uptitrate dose.      Depression/Anxiety   - Continued PTA bupropion and citalopram      Diet: Combination Diet 2 gm NA Diet; No Caffeine Diet (and additional linked orders)  Fluid restriction 2000 ML FLUID (and additional linked orders)                DVT Prophylaxis: Pneumatic Compression Devices  Code Status: No CPR- Do NOT Intubate    Disposition: Expected discharge in 1 to 2 days if remains stable.    Guillermo Yancey MD, MD  Text Page  (7am - 6pm)    Interval History   Patient seen and evaluated in the room today, sitting in bed, and doing well, denies any chest pain shortness of breath orthopnea PND palpitation no fever chills or cough no dysuria materia constipation diarrhea.  She is off oxygen currently on room air.    No other significant event overnight    -Data reviewed today: I reviewed all new labs and imaging results over the last 24 hours. I personally reviewed no images or EKG's today.    Physical Exam   Temp: 98  F (36.7  C) Temp src: Oral BP: 130/89 Pulse: 92   Resp: 18 SpO2: 97 % O2 Device: Nasal cannula Oxygen Delivery: 4 LPM  Vitals:    02/16/24 0559 02/17/24 0651   Weight: 53.6 kg (118 lb 2.7 oz) 51.2 kg (112 lb 14 oz)     Vital Signs with Ranges  Temp:  [97.7  F (36.5  C)-98.1  F (36.7  C)] 98  F (36.7  C)  Pulse:  [] 92  Resp:  [18-20] 18  BP: (126-130)/(78-91) 130/89  SpO2:  [95 %-98 %] 97 %  I/O last 3 completed shifts:  In: 240 [P.O.:240]  Out: 750 [Urine:750]    Constitutional: awake, alert, cooperative, no  apparent distress, and appears stated age  Eyes: Lids and lashes normal, pupils equal, round and reactive to light, extra ocular muscles intact, sclera clear, conjunctiva normal  Respiratory: No increased work of breathing, good air exchange, clear to auscultation bilaterally, no crackles or wheezing  Cardiovascular: Normal apical impulse, regular rate and rhythm, normal S1 and S2, no S3 or S4, and systolic murmur grade 3 positive  GI: No scars, normal bowel sounds, soft, non-distended, non-tender, no masses palpated, no hepatosplenomegally  Skin: no bruising or bleeding  Musculoskeletal: no lower extremity pitting edema present  Neurologic: No focal deficit    Medications    - MEDICATION INSTRUCTIONS -      ACE/ARB/ARNI NOT PRESCRIBED      BETA BLOCKER NOT PRESCRIBED        buPROPion  100 mg Oral BID    citalopram  20 mg Oral QAM    ezetimibe  5 mg Oral QAM    furosemide  20 mg Oral Daily    lisinopril  2.5 mg Oral Daily    metoprolol tartrate  12.5 mg Oral BID    sodium chloride (PF)  3 mL Intracatheter Q8H       Data   Recent Labs   Lab 02/17/24  1038 02/16/24  0554 02/16/24  0550   WBC 6.6 10.3  --    HGB 11.3* 11.7 12.9   MCV 90 94  --     293  --     141 141   POTASSIUM 4.0 3.8 4.2   CHLORIDE 104 104  --    CO2 27 22  --    BUN 30.1* 21.6  --    CR 1.24* 1.08*  --    ANIONGAP 10 15  --    DANIELA 8.8 8.4  --    * 230*  --    ALBUMIN  --  3.8  --    PROTTOTAL  --  6.3*  --    BILITOTAL  --  0.3  --    ALKPHOS  --  110  --    ALT  --  20  --    AST  --  37  --        No results found for this or any previous visit (from the past 24 hour(s)).

## 2024-02-17 NOTE — PLAN OF CARE
Goal Outcome Evaluation:  Summary: Resp Failure,   DATE & TIME: 2/16/24-2/17/24 [id number]   Cognitive Concerns/ Orientation : AOx4   BEHAVIOR & AGGRESSION TOOL COLOR: Green   ABNL VS/O2: VSS on 4L   MOBILITY: SBA  PAIN MANAGMENT: Denied  DIET: 2gm Na+; 2000 Fluid restriction, No Caffeine   BOWEL/BLADDER: Continent/Incontinent pt voided three times, first post void 179ml, second post void 73 ml and third 150ml  ABNL LAB/BG:   DRAIN/DEVICES: 2 PIV SL,  on strict I/Os  TELEMETRY RHYTHM: BBB  SKIN: WDL x Scattered bruise  TESTS/PROCEDURES: NA  D/C DAY/GOALS/PLACE: Pending  OTHER IMPORTANT INFO:

## 2024-02-18 ENCOUNTER — APPOINTMENT (OUTPATIENT)
Dept: OCCUPATIONAL THERAPY | Facility: CLINIC | Age: 89
DRG: 280 | End: 2024-02-18
Payer: MEDICARE

## 2024-02-18 LAB
ALBUMIN SERPL BCG-MCNC: 3.7 G/DL (ref 3.5–5.2)
ANION GAP SERPL CALCULATED.3IONS-SCNC: 10 MMOL/L (ref 7–15)
BASOPHILS # BLD AUTO: 0 10E3/UL (ref 0–0.2)
BASOPHILS NFR BLD AUTO: 0 %
BUN SERPL-MCNC: 35.8 MG/DL (ref 8–23)
CALCIUM SERPL-MCNC: 8.6 MG/DL (ref 8.2–9.6)
CHLORIDE SERPL-SCNC: 102 MMOL/L (ref 98–107)
CREAT SERPL-MCNC: 1.28 MG/DL (ref 0.51–0.95)
DEPRECATED HCO3 PLAS-SCNC: 27 MMOL/L (ref 22–29)
EGFRCR SERPLBLD CKD-EPI 2021: 39 ML/MIN/1.73M2
EOSINOPHIL # BLD AUTO: 0.2 10E3/UL (ref 0–0.7)
EOSINOPHIL NFR BLD AUTO: 3 %
ERYTHROCYTE [DISTWIDTH] IN BLOOD BY AUTOMATED COUNT: 15.6 % (ref 10–15)
GLUCOSE SERPL-MCNC: 92 MG/DL (ref 70–99)
HCT VFR BLD AUTO: 34.5 % (ref 35–47)
HGB BLD-MCNC: 11.3 G/DL (ref 11.7–15.7)
IMM GRANULOCYTES # BLD: 0 10E3/UL
IMM GRANULOCYTES NFR BLD: 0 %
LYMPHOCYTES # BLD AUTO: 1.6 10E3/UL (ref 0.8–5.3)
LYMPHOCYTES NFR BLD AUTO: 26 %
MAGNESIUM SERPL-MCNC: 2.3 MG/DL (ref 1.7–2.3)
MCH RBC QN AUTO: 29.6 PG (ref 26.5–33)
MCHC RBC AUTO-ENTMCNC: 32.8 G/DL (ref 31.5–36.5)
MCV RBC AUTO: 90 FL (ref 78–100)
MONOCYTES # BLD AUTO: 0.5 10E3/UL (ref 0–1.3)
MONOCYTES NFR BLD AUTO: 8 %
NEUTROPHILS # BLD AUTO: 3.9 10E3/UL (ref 1.6–8.3)
NEUTROPHILS NFR BLD AUTO: 63 %
NRBC # BLD AUTO: 0 10E3/UL
NRBC BLD AUTO-RTO: 0 /100
NT-PROBNP SERPL-MCNC: ABNORMAL PG/ML (ref 0–1800)
PHOSPHATE SERPL-MCNC: 4.7 MG/DL (ref 2.5–4.5)
PLATELET # BLD AUTO: 228 10E3/UL (ref 150–450)
POTASSIUM SERPL-SCNC: 4.4 MMOL/L (ref 3.4–5.3)
RBC # BLD AUTO: 3.82 10E6/UL (ref 3.8–5.2)
SODIUM SERPL-SCNC: 139 MMOL/L (ref 135–145)
WBC # BLD AUTO: 6.2 10E3/UL (ref 4–11)

## 2024-02-18 PROCEDURE — 250N000013 HC RX MED GY IP 250 OP 250 PS 637: Performed by: STUDENT IN AN ORGANIZED HEALTH CARE EDUCATION/TRAINING PROGRAM

## 2024-02-18 PROCEDURE — 99232 SBSQ HOSP IP/OBS MODERATE 35: CPT | Performed by: INTERNAL MEDICINE

## 2024-02-18 PROCEDURE — 250N000013 HC RX MED GY IP 250 OP 250 PS 637: Performed by: INTERNAL MEDICINE

## 2024-02-18 PROCEDURE — 83735 ASSAY OF MAGNESIUM: CPT | Performed by: INTERNAL MEDICINE

## 2024-02-18 PROCEDURE — 36415 COLL VENOUS BLD VENIPUNCTURE: CPT | Performed by: INTERNAL MEDICINE

## 2024-02-18 PROCEDURE — 120N000001 HC R&B MED SURG/OB

## 2024-02-18 PROCEDURE — 97535 SELF CARE MNGMENT TRAINING: CPT | Mod: GO | Performed by: OCCUPATIONAL THERAPIST

## 2024-02-18 PROCEDURE — 80069 RENAL FUNCTION PANEL: CPT | Performed by: INTERNAL MEDICINE

## 2024-02-18 PROCEDURE — 83880 ASSAY OF NATRIURETIC PEPTIDE: CPT | Performed by: INTERNAL MEDICINE

## 2024-02-18 PROCEDURE — 85004 AUTOMATED DIFF WBC COUNT: CPT | Performed by: INTERNAL MEDICINE

## 2024-02-18 RX ORDER — METOPROLOL TARTRATE 25 MG/1
25 TABLET, FILM COATED ORAL 2 TIMES DAILY
Status: DISCONTINUED | OUTPATIENT
Start: 2024-02-18 | End: 2024-02-19

## 2024-02-18 RX ADMIN — LISINOPRIL 2.5 MG: 2.5 TABLET ORAL at 08:29

## 2024-02-18 RX ADMIN — FUROSEMIDE 20 MG: 20 TABLET ORAL at 08:30

## 2024-02-18 RX ADMIN — METOPROLOL TARTRATE 12.5 MG: 25 TABLET, FILM COATED ORAL at 08:30

## 2024-02-18 RX ADMIN — CITALOPRAM HYDROBROMIDE 20 MG: 20 TABLET ORAL at 08:30

## 2024-02-18 RX ADMIN — METOPROLOL TARTRATE 25 MG: 25 TABLET, FILM COATED ORAL at 20:13

## 2024-02-18 RX ADMIN — BUPROPION HYDROCHLORIDE 100 MG: 100 TABLET, FILM COATED ORAL at 08:29

## 2024-02-18 RX ADMIN — BUPROPION HYDROCHLORIDE 100 MG: 100 TABLET, FILM COATED ORAL at 20:12

## 2024-02-18 RX ADMIN — EZETIMIBE 5 MG: 10 TABLET ORAL at 08:29

## 2024-02-18 RX ADMIN — GABAPENTIN 100 MG: 100 CAPSULE ORAL at 20:13

## 2024-02-18 ASSESSMENT — ACTIVITIES OF DAILY LIVING (ADL)
ADLS_ACUITY_SCORE: 34
ADLS_ACUITY_SCORE: 30
DEPENDENT_IADLS:: TRANSPORTATION;SHOPPING;CLEANING
ADLS_ACUITY_SCORE: 34
ADLS_ACUITY_SCORE: 30

## 2024-02-18 NOTE — PROGRESS NOTES
The patient and her family have decided to proceed with a pharmacologic stress test for ischemic evaluation prior to considering an invasive angiogram. I will arrange for this.

## 2024-02-18 NOTE — CONSULTS
Care Management Initial Consult    General Information  Assessment completed with: Family, daughter Genesis  Type of CM/SW Visit: Initial Assessment    Primary Care Provider verified and updated as needed: Yes (Dr. Lisa Raymundo with [facility])   Readmission within the last 30 days: no previous admission in last 30 days      Reason for Consult: discharge planning  Advance Care Planning: Advance Care Planning Reviewed: present on chart          Communication Assessment  Patient's communication style: spoken language (English or Bilingual)    Hearing Difficulty or Deaf: yes   Wear Glasses or Blind: yes    Cognitive  Cognitive/Neuro/Behavioral: WDL                      Living Environment:   People in home: alone     Current living Arrangements: house      Able to return to prior arrangements: yes  Living Arrangement Comments: house; no steps to enter; has 1 step inside to family room; one level home; laundry on main level    Family/Social Support:  Care provided by: self  Provides care for: no one                Description of Support System:           Current Resources:   Patient receiving home care services: No     Community Resources: None  Equipment currently used at home: walker, standard, shower chair, grab bar, tub/shower  Supplies currently used at home:      Employment/Financial:  Employment Status:          Financial Concerns: none           Does the patient's insurance plan have a 3 day qualifying hospital stay waiver?     Lifestyle & Psychosocial Needs:  Social Determinants of Health     Food Insecurity: Not on file   Depression: Not on file   Housing Stability: Not on file   Tobacco Use: Not on file   Financial Resource Strain: Not on file   Alcohol Use: Not on file   Transportation Needs: Not on file   Physical Activity: Not on file   Interpersonal Safety: Not on file   Stress: Not on file   Social Connections: Not on file       Functional Status:  Prior to admission patient needed assistance:   Dependent  ADLs:: Ambulation-walker  Dependent IADLs:: Transportation, Shopping, Cleaning (niece assists with cleaning)       Mental Health Status:          Chemical Dependency Status:                Values/Beliefs:  Spiritual, Cultural Beliefs, Orthodox Practices, Values that affect care: no               Additional Information:  Patient was sleeping so consult was via phone with daughter Genesis.      Patient has been independent in her single level home.  She had some help from her niece with cleaning and recently gave up driving but outside of that she was independent in her own care and that of the home.  She uses a walker.  She has a very supportive family.    She was admitted with CHF, undergoing a stress test tomorrow.    OT recommended home with home care.  A referral was placed and [facility] was the accepting agency for PT/OT/RN.  OT did recommend that someone be with patient at home.  Daughter Genesis states between her 5 siblings and grandchildren they will provide a presence with the patient until she returns to baseline.    A post hospital appointment was scheduled for Wednesday March 6 at 10:00 am with her PCP Dr. Lisa Raymundo at [facility].    Care management will continue to follow through discharge.    Annie Lewis RN  Inpatient Float Care Coordinator  [facility]

## 2024-02-18 NOTE — PROGRESS NOTES
[facility]    Cardiology Consultation - Progress Note     Date of Admission:  2/16/2024  Date of Service: 02/18/2024    Reason for Consult   Reason for consult: new heart failure    History of Present Illness   Teresa Krishna is a 89 year old female who was admitted on 2/16/2024 for acute hypoxic respiratory failure. Medical comorbidities include hypertension, hyperlipidemia, and coronary atherosclerosis based on prior CT calcium score.    She was previously followed in the [facility] system.  She is independent and lives alone.  Echocardiogram from 2011 showed preserved LV function.  Her last electrocardiogram from 2022 showed normal sinus rhythm and normal QRS.     She reports dyspnea on exertion over the past 1 year.  Her symptoms have been worsening over the last several months. Initial evaluation here showed significantly elevated NT proBNP and chest imaging consistent with congestive heart failure. Her electrocardiogram shows sinus tachycardia and left bundle branch block.  The chronicity of the LBBB is unclear but it is new since 2022.  She denies history of chest pain.  She also underwent transthoracic echocardiogram which demonstrated moderate LV systolic dysfunction (EF 32%) and septal motion abnormality consistent with conduction disease.    Interval history: her breathing has improved with diuresis, she has had no chest pain    Assessment & Plan   Acute heart failure with reduced ejection fraction (LVEF 32%)  Left bundle branch block, presumably chronic  Trivial troponin elevation without significant delta, likely demand related  Hypertension  Moderate to severe aortic stenosis    Plan:  Continue Lasix 20 mg daily, ezetimibe 5 mg daily, lisinopril 2.5 mg daily  Increase metoprolol tartrate to 25 mg BID starting this evening, uptitrate as able  Consider spironolactone and SGLT-2 inhibitor if able  PTA amlodipine discontinued  Will need to rule out ischemia with stress test or  coronary angiogram after the weekend. I discussed these options with her and her son. They will discuss with other family members and let us know either later today or tomorrow morning.    NPO after midnght  The aortic stenosis is in the moderate range by gradient, the stroke-volume index doesn't suggest low flow-low gradient AS    Jesus Johnson MD    Primary Care Physician   Physician No Ref-Primary    Patient Active Problem List   Diagnosis    Acute pulmonary edema (H)    SOB (shortness of breath)    Acute respiratory failure with hypoxia and hypercapnia (H)       Past Medical History   I have reviewed this patient's medical history and updated it with pertinent information if needed.   No past medical history on file.    Past Surgical History   I have reviewed this patient's surgical history and updated it with pertinent information if needed.  No past surgical history on file.    Prior to Admission Medications   Prior to Admission Medications   Prescriptions Last Dose Informant Patient Reported? Taking?   acetaminophen (TYLENOL) 500 MG tablet prn Daughter Yes Yes   Sig: Take 500-1,000 mg by mouth every 6 hours as needed for mild pain   amLODIPine (NORVASC) 5 MG tablet 2/15/2024 at am Daughter Yes Yes   Sig: Take 5 mg by mouth every morning   amoxicillin (AMOXIL) 500 MG capsule prn Daughter Yes Yes   Sig: Take 500 mg by mouth Prior to dental appointments   buPROPion (WELLBUTRIN) 100 MG tablet 2/15/2024 Daughter Yes Yes   Sig: Take 100 mg by mouth 2 times daily   citalopram (CELEXA) 20 MG tablet 2/15/2024 at am Daughter Yes Yes   Sig: Take 20 mg by mouth every morning   ezetimibe (ZETIA) 5 mg TABS half-tab 2/15/2024 at am Daughter Yes Yes   Sig: Take 5 mg by mouth every morning   gabapentin (NEURONTIN) 100 MG capsule prn Daughter Yes Yes   Sig: Take 100 mg by mouth nightly as needed for other (pain, insomnia)   multivitamin, therapeutic (THERA-VIT) TABS tablet 2/15/2024 Daughter Yes Yes   Sig: Take 1  tablet by mouth every evening   raloxifene (EVISTA) 60 MG tablet 2/15/2024 Daughter Yes Yes   Sig: Take 60 mg by mouth every evening      Facility-Administered Medications: None     Current Facility-Administered Medications   Medication Dose Route Frequency    buPROPion  100 mg Oral BID    citalopram  20 mg Oral QAM    ezetimibe  5 mg Oral QAM    furosemide  20 mg Oral Daily    lisinopril  2.5 mg Oral Daily    metoprolol tartrate  12.5 mg Oral BID    sodium chloride (PF)  3 mL Intracatheter Q8H     Current Facility-Administered Medications   Medication Last Rate    - MEDICATION INSTRUCTIONS -      ACE/ARB/ARNI NOT PRESCRIBED      BETA BLOCKER NOT PRESCRIBED       Allergies   No Known Allergies    Social History        Family History   No family history on file.    Review of Systems   The comprehensive Review of Systems is negative other than noted in the HPI or here.     Physical Exam   Vital Signs with Ranges  Temp:  [97.5  F (36.4  C)-98.5  F (36.9  C)] 97.6  F (36.4  C)  Pulse:  [74-98] 74  Resp:  [18] 18  BP: (105-116)/(68-79) 116/79  SpO2:  [92 %-95 %] 92 %  Wt Readings from Last 4 Encounters:   02/17/24 52.9 kg (116 lb 9.6 oz)     I/O last 3 completed shifts:  In: 240 [P.O.:240]  Out: 320 [Urine:320]      Vitals: /79 (BP Location: Left arm)   Pulse 74   Temp 97.6  F (36.4  C) (Oral)   Resp 18   Wt 52.9 kg (116 lb 9.6 oz)   SpO2 92%     General: Alert, oriented, in no acute distress  HEENT: PERRLA, mucous membranes moist  Neck: Normal JVP  CV: Regular rate and rhythm without murmur  Respiratory: Clear to auscultation bilaterally  GI: Normoactive bowel sounds; soft, non-tender abdomen  Neuro: No focal deficits appreciated  Extremities: No peripheral edema bilaterally    Recent Labs   Lab 02/18/24  0850 02/17/24  1038 02/16/24  0554   WBC 6.2 6.6 10.3   HGB 11.3* 11.3* 11.7   MCV 90 90 94    245 293    141 141   POTASSIUM 4.4 4.0 3.8   CHLORIDE 102 104 104   CO2 27 27 22   BUN 35.8* 30.1*  "21.6   CR 1.28* 1.24* 1.08*   GFRESTIMATED 39* 40* 48*   ANIONGAP 10 10 15   DANIELA 8.6 8.8 8.4   GLC 92 100* 230*   ALBUMIN 3.7  --  3.8   PROTTOTAL  --   --  6.3*   BILITOTAL  --   --  0.3   ALKPHOS  --   --  110   ALT  --   --  20   AST  --   --  37     No results for input(s): \"CHOL\", \"HDL\", \"LDL\", \"TRIG\", \"CHOLHDLRATIO\" in the last 30390 hours.  Recent Labs   Lab 24  0850 24  1038 24  0554   WBC 6.2 6.6 10.3   HGB 11.3* 11.3* 11.7   HCT 34.5* 33.8* 37.2   MCV 90 90 94    245 293     Recent Labs   Lab 24  0650 24  0550   PHV 7.25* 7.08*  7.08*   PO2V 36 54*  55*   PCO2V 55* 79*  79*   HCO3V 24 23  23     Recent Labs   Lab 24  0850 24  0554   NTBNPI 16,395* 15,783*     No results for input(s): \"DD\" in the last 168 hours.  No results for input(s): \"SED\", \"CRP\" in the last 168 hours.  Recent Labs   Lab 24  0850 24  1038 24  0554    245 293     No results for input(s): \"TSH\" in the last 168 hours.  Recent Labs   Lab 24  0742   COLOR Light Yellow   APPEARANCE Clear   URINEGLC Negative   URINEBILI Negative   URINEKETONE Negative   SG 1.026   UBLD Negative   URINEPH 5.5   PROTEIN 30*   NITRITE Negative   LEUKEST Negative   RBCU 11*   WBCU 2       Imaging:  Recent Results (from the past 48 hour(s))   XR Chest Port 1 View    Narrative    EXAM: XR CHEST PORT 1 VIEW  LOCATION: [facility]  DATE: 2024    INDICATION: shortness of breath  COMPARISON: None.      Impression    IMPRESSION: Mild to moderate cardiomegaly with small bilateral pleural effusions and pulmonary edema. No confluent airspace consolidation or pneumothorax.   Echocardiogram Complete   Result Value    Biplane LVEF 32%    Narrative    [id number]  [id number]  2010^AYAH^BREEZY^A     [facility]  Echocardiography Laboratory  [address] [place] [id number]     Name: DESHAUN DEAL  MRN: [id number]  : " 11/21/1930  Study Date: 02/16/2024 09:00 AM  Age: 89 yrs  Gender: Female  Patient Location: [facility]  Reason For Study: CHF  Ordering Physician: BREEZY POON  Referring Physician: RADHA PMD  Performed By: Tao Nagel RDCS     BSA: 1.5 m2  Height: 60 in  Weight: 118 lb  HR: 92  BP: 97/69 mmHg  ______________________________________________________________________________  Procedure  Complete Portable Echo Adult. Optison (NDC #[id number]) given intravenously.  ______________________________________________________________________________  Interpretation Summary     The left ventricle is normal in size.  Diastolic Doppler findings (E/E' ratio and/or other parameters) suggest left  ventricular filling pressures are increased.  Biplane LVEF is 32%.  Moderate to severe valvular aortic stenosis.  ______________________________________________________________________________  Left Ventricle  The left ventricle is normal in size. A sigmoid septum is present. Diastolic  Doppler findings (E/E' ratio and/or other parameters) suggest left ventricular  filling pressures are increased. Biplane LVEF is 32%. Septal motion is  consistent with conduction abnormality.     Right Ventricle  The right ventricle is normal in size and function.     Atria  The left atrium is moderately dilated. Right atrial size is normal. There is  no color Doppler evidence of an atrial shunt.     Mitral Valve  The mitral valve leaflets are mildly thickened. There is mild (1+) mitral  regurgitation.     Tricuspid Valve  There is mild (1+) tricuspid regurgitation. The right ventricular systolic  pressure is approximated at 37.0 mmHg plus the right atrial pressure. IVC  diameter and respiratory changes fall into an intermediate range suggesting an  RA pressure of 8 mmHg.     Aortic Valve  There is mild (1+) aortic regurgitation. The mean AoV pressure gradient is  30.0 mmHg. Moderate to severe valvular aortic stenosis.     Pulmonic Valve  There is trace  pulmonic valvular regurgitation.     Vessels  Normal size aorta. The aortic root is normal size.     Pericardium  There is no pericardial effusion.     Rhythm  Sinus rhythm was noted.  ______________________________________________________________________________  MMode/2D Measurements & Calculations  IVSd: 1.1 cm     LVIDd: 4.3 cm  LVIDs: 3.7 cm  LVPWd: 1.2 cm  FS: 14.2 %  LV mass(C)d: 177.1 grams  LV mass(C)dI: 118.8 grams/m2  Ao root diam: 2.8 cm  LA dimension: 3.6 cm  LA/Ao: 1.3  LVOT diam: 1.9 cm  LVOT area: 2.9 cm2  Ao root diam index Ht(cm/m): 1.8  Ao root diam index BSA (cm/m2): 1.9  RV Base: 3.6 cm  RWT: 0.58  TAPSE: 2.7 cm     Doppler Measurements & Calculations  MV E max gabriel: 67.4 cm/sec  MV A max gabriel: 178.9 cm/sec  MV E/A: 0.38  Ao V2 max: 365.5 cm/sec  Ao max P.4 mmHg  Ao V2 mean: 262.2 cm/sec  Ao mean P.0 mmHg  Ao V2 VTI: 76.2 cm  IVÁN(I,D): 0.99 cm2  IVÁN(V,D): 1.1 cm2  AI P1/2t: 308.5 msec  LV V1 max P.1 mmHg  LV V1 max: 133.6 cm/sec  LV V1 VTI: 26.0 cm     SV(LVOT): 75.5 ml  SI(LVOT): 50.6 ml/m2  PA acc time: 0.10 sec  TR max gabriel: 304.3 cm/sec  TR max P.0 mmHg  AV Gabriel Ratio (DI): 0.37  IVÁN Index (cm2/m2): 0.66  E/E' av.5  Lateral E/e': 5.7  Medial E/e': 17.2  RV S Gabriel: 14.1 cm/sec     ______________________________________________________________________________  Report approved by: Ruben Whitman 2024 10:42 AM               Medical Decision Making       35 MINUTES SPENT BY ME on the date of service doing chart review, history, exam, documentation & further activities per the note.

## 2024-02-18 NOTE — PLAN OF CARE
Goal Outcome Evaluation:  Summary: Resp Failure,   DATE & TIME: 2/17/24-2/18/24 0480-3808   Cognitive Concerns/ Orientation : AOx4   BEHAVIOR & AGGRESSION TOOL COLOR: Green   ABNL VS/O2: VSS on RA  MOBILITY: SBA  PAIN MANAGMENT: Denied  DIET: 2gm Na+; 2000 Fluid restriction, No Caffeine  BOWEL/BLADDER: Continent pt voided several times this shift, last post void scan 207 mls  ABNL LAB/BG:   DRAIN/DEVICES: 2 PIV SL,  on strict I/Os  TELEMETRY RHYTHM: BBB  SKIN: WDL x Scattered bruise  TESTS/PROCEDURES: NA  D/C DAY/GOALS/PLACE: Pending  OTHER IMPORTANT INFO:

## 2024-02-18 NOTE — PROGRESS NOTES
[facility]    Hospitalist Progress Note    Brief Summary:  This is a 89-year-old female with history of hypertension, hyperlipidemia, depression, here lungs presented with acute worsening shortness of breath and found to be in acute systolic congestive heart failure and respiratory distress started on BiPAP and is an diuretic and subsequently admitted.    Assessment & Plan        Acute Systolic Congestive heart failure, new diagnosis: Improved  Moderate to severe aortic stenosis   Acute Hypoxic Hypercapnic Respiratory failure: Resolved  Lactic acidosis: Resolved    Pt presented to the ED with severe respiratory distress. Per report she has been having increasing shortness of breath over the past several months.  On the day of admission her respiratory status worsened significantly. She called EMS. Per report she was laying on the floor, minimally responsive. Initial sats in the 70s. Respiratory status improved markedly on BiPAP.   * Initial labs notable for VBG pH of 7.08, pCO2 of 79, and Lactate of 6.4.   * BNP also elevated to 15,783.   * CXR with mild to moderate cardiomegaly with small bilateral pleural effusions and pulmonary edema. No confluent airspace consolidation or pneumothorax.   * COVID, flu, RSV negative.     * TTE was obtained and showed LVEF of 32%. Diastolic doppler findings suggest increased LV filling pressures. There is also moderate to severe aortic stenosis.     She was admitted, cardiology was consulted.  She was started on IV Lasix 60 mg twice daily, she responded very well to the treatment.  She is now on room air, breathing comfortably no sign of fluid overload at this time.  I will stop IV Lasix, as her creatinine already increased to 1.24 from 1.08.  No JVD, no basal crackles, no lower extremity edema, breathing comfortably at room air at this time on 2/17/2024.  I will start her on low-dose metoprolol 12.5 mg twice daily, lisinopril 2.5 mg daily, stop IV Lasix  and started on oral Lasix 20 mg daily.  Strict intake and output charting Daily weights,  Lactic acid is now normal.  Was elevated likely because of CHF and pulm edema  Appreciate input from the cardiology, moderate to severe aortic stenosis, not enough to do any intervention at this time  Cardiology is planning to do coronary angiogram.  Agree with uptitrating the metoprolol to 25 mg twice daily.  Continue with Lasix 20 mg daily and lisinopril 2.5 mg daily.  Slight increase in creatinine today most likely because of lisinopril and Lasix.       HTN  -Discontinue amlodipine.  Started on low-dose metoprolol 12.5 mg twice daily and lisinopril 2.5 mg daily.    Increase metoprolol to 25 mg twice daily agree with the      Depression/Anxiety   - Continued PTA bupropion and citalopram      Diet: Combination Diet 2 gm NA Diet; No Caffeine Diet (and additional linked orders)  Fluid restriction 2000 ML FLUID (and additional linked orders)                DVT Prophylaxis: Pneumatic Compression Devices  Code Status: No CPR- Do NOT Intubate    Disposition: Expected discharge in 1 to 2 days if remains stable.    Guillermo Yancey MD, MD  Text Page  (7am - 6pm)    Interval History   Patient seen and evaluated in the room today, has some mild shortness of breath, slept well overnight denies any chest pain fever chills nausea vomiting headache dizziness lightheadedness no orthopnea PND or palpitation    No other significant event overnight    -Data reviewed today: I reviewed all new labs and imaging results over the last 24 hours. I personally reviewed no images or EKG's today.    Physical Exam   Temp: 97.6  F (36.4  C) Temp src: Oral BP: 116/79 Pulse: 74   Resp: 18 SpO2: 92 % O2 Device: None (Room air)    Vitals:    02/16/24 0559 02/17/24 0651 02/17/24 1428   Weight: 53.6 kg (118 lb 2.7 oz) 51.2 kg (112 lb 14 oz) 52.9 kg (116 lb 9.6 oz)     Vital Signs with Ranges  Temp:  [97.5  F (36.4  C)-98.5  F (36.9  C)] 97.6  F (36.4  C)  Pulse:   [74-98] 74  Resp:  [18] 18  BP: (105-116)/(68-79) 116/79  SpO2:  [92 %-95 %] 92 %  I/O last 3 completed shifts:  In: 240 [P.O.:240]  Out: 320 [Urine:320]    Constitutional: awake, alert, cooperative, no apparent distress, and appears stated age  Eyes: Lids and lashes normal, pupils equal, round and reactive to light, extra ocular muscles intact, sclera clear, conjunctiva normal  Respiratory: No increased work of breathing, good air exchange, clear to auscultation bilaterally, no crackles or wheezing  Cardiovascular: Normal apical impulse, regular rate and rhythm, normal S1 and S2, no S3 or S4, and systolic murmur grade 3 positive  GI: No scars, normal bowel sounds, soft, non-distended, non-tender, no masses palpated, no hepatosplenomegally  Skin: no bruising or bleeding  Musculoskeletal: no lower extremity pitting edema present  Neurologic: No focal deficit    Medications    - MEDICATION INSTRUCTIONS -      ACE/ARB/ARNI NOT PRESCRIBED      BETA BLOCKER NOT PRESCRIBED        buPROPion  100 mg Oral BID    citalopram  20 mg Oral QAM    ezetimibe  5 mg Oral QAM    furosemide  20 mg Oral Daily    lisinopril  2.5 mg Oral Daily    metoprolol tartrate  25 mg Oral BID    sodium chloride (PF)  3 mL Intracatheter Q8H       Data   Recent Labs   Lab 02/18/24  0850 02/17/24  1038 02/16/24  0554   WBC 6.2 6.6 10.3   HGB 11.3* 11.3* 11.7   MCV 90 90 94    245 293    141 141   POTASSIUM 4.4 4.0 3.8   CHLORIDE 102 104 104   CO2 27 27 22   BUN 35.8* 30.1* 21.6   CR 1.28* 1.24* 1.08*   ANIONGAP 10 10 15   DANIELA 8.6 8.8 8.4   GLC 92 100* 230*   ALBUMIN 3.7  --  3.8   PROTTOTAL  --   --  6.3*   BILITOTAL  --   --  0.3   ALKPHOS  --   --  110   ALT  --   --  20   AST  --   --  37       No results found for this or any previous visit (from the past 24 hour(s)).

## 2024-02-18 NOTE — PLAN OF CARE
Goal Outcome Evaluation:      Plan of Care Reviewed With: patient    Summary: Resp Failure,   DATE & TIME: 2/17/24 3069-7158   Cognitive Concerns/ Orientation : AOx4   BEHAVIOR & AGGRESSION TOOL COLOR: Green   ABNL VS/O2: VSS on RA  MOBILITY: SBA  PAIN MANAGMENT: Denied  DIET: 2gm Na+; 2000 Fluid restriction, No Caffeine- had about 1200ml so far  BOWEL/BLADDER: Continent/Incontinent pt voided twice, last post void scan 7mls  ABNL LAB/BG: Trop 65  DRAIN/DEVICES: 2 PIV SL,  on strict I/Os  TELEMETRY RHYTHM: BBB  SKIN: WDL x Scattered bruise  TESTS/PROCEDURES: NA  D/C DAY/GOALS/PLACE: Pending  OTHER IMPORTANT INFO:

## 2024-02-19 ENCOUNTER — APPOINTMENT (OUTPATIENT)
Dept: NUCLEAR MEDICINE | Facility: CLINIC | Age: 89
DRG: 280 | End: 2024-02-19
Attending: INTERNAL MEDICINE
Payer: MEDICARE

## 2024-02-19 LAB
ANION GAP SERPL CALCULATED.3IONS-SCNC: 10 MMOL/L (ref 7–15)
BUN SERPL-MCNC: 31 MG/DL (ref 8–23)
CALCIUM SERPL-MCNC: 8.7 MG/DL (ref 8.2–9.6)
CHLORIDE SERPL-SCNC: 103 MMOL/L (ref 98–107)
CREAT SERPL-MCNC: 1.2 MG/DL (ref 0.51–0.95)
CV STRESS MAX HR HE: 78
DEPRECATED HCO3 PLAS-SCNC: 28 MMOL/L (ref 22–29)
EGFRCR SERPLBLD CKD-EPI 2021: 42 ML/MIN/1.73M2
GLUCOSE SERPL-MCNC: 95 MG/DL (ref 70–99)
MAGNESIUM SERPL-MCNC: 2.3 MG/DL (ref 1.7–2.3)
NUC STRESS EJECTION FRACTION: 27 %
PHOSPHATE SERPL-MCNC: 4.2 MG/DL (ref 2.5–4.5)
POTASSIUM SERPL-SCNC: 4.5 MMOL/L (ref 3.4–5.3)
RATE PRESSURE PRODUCT: NORMAL
SODIUM SERPL-SCNC: 141 MMOL/L (ref 135–145)
STRESS ECHO BASELINE DIASTOLIC HE: 90
STRESS ECHO BASELINE HR: 70 BPM
STRESS ECHO BASELINE SYSTOLIC BP: 135
STRESS ECHO CALCULATED PERCENT HR: 61 %
STRESS ECHO LAST STRESS DIASTOLIC BP: 80
STRESS ECHO LAST STRESS SYSTOLIC BP: 140
STRESS ECHO TARGET HR: 127

## 2024-02-19 PROCEDURE — 250N000013 HC RX MED GY IP 250 OP 250 PS 637: Performed by: INTERNAL MEDICINE

## 2024-02-19 PROCEDURE — 99232 SBSQ HOSP IP/OBS MODERATE 35: CPT | Performed by: INTERNAL MEDICINE

## 2024-02-19 PROCEDURE — 999N000049 HC STATISTIC ECHO STRESS OR NM NPI

## 2024-02-19 PROCEDURE — G1010 CDSM STANSON: HCPCS | Performed by: INTERNAL MEDICINE

## 2024-02-19 PROCEDURE — 120N000001 HC R&B MED SURG/OB

## 2024-02-19 PROCEDURE — 83735 ASSAY OF MAGNESIUM: CPT | Performed by: INTERNAL MEDICINE

## 2024-02-19 PROCEDURE — 99232 SBSQ HOSP IP/OBS MODERATE 35: CPT | Mod: 25 | Performed by: PHYSICIAN ASSISTANT

## 2024-02-19 PROCEDURE — 84100 ASSAY OF PHOSPHORUS: CPT | Performed by: INTERNAL MEDICINE

## 2024-02-19 PROCEDURE — 78452 HT MUSCLE IMAGE SPECT MULT: CPT | Mod: MG

## 2024-02-19 PROCEDURE — 36415 COLL VENOUS BLD VENIPUNCTURE: CPT | Performed by: INTERNAL MEDICINE

## 2024-02-19 PROCEDURE — 250N000013 HC RX MED GY IP 250 OP 250 PS 637: Performed by: STUDENT IN AN ORGANIZED HEALTH CARE EDUCATION/TRAINING PROGRAM

## 2024-02-19 PROCEDURE — 250N000013 HC RX MED GY IP 250 OP 250 PS 637: Performed by: PHYSICIAN ASSISTANT

## 2024-02-19 PROCEDURE — 78452 HT MUSCLE IMAGE SPECT MULT: CPT | Mod: 26 | Performed by: INTERNAL MEDICINE

## 2024-02-19 PROCEDURE — 93016 CV STRESS TEST SUPVJ ONLY: CPT | Performed by: INTERNAL MEDICINE

## 2024-02-19 PROCEDURE — 93018 CV STRESS TEST I&R ONLY: CPT | Performed by: INTERNAL MEDICINE

## 2024-02-19 PROCEDURE — 250N000011 HC RX IP 250 OP 636: Mod: JZ | Performed by: INTERNAL MEDICINE

## 2024-02-19 PROCEDURE — 343N000001 HC RX 343: Performed by: STUDENT IN AN ORGANIZED HEALTH CARE EDUCATION/TRAINING PROGRAM

## 2024-02-19 PROCEDURE — 93017 CV STRESS TEST TRACING ONLY: CPT

## 2024-02-19 PROCEDURE — A9500 TC99M SESTAMIBI: HCPCS | Performed by: STUDENT IN AN ORGANIZED HEALTH CARE EDUCATION/TRAINING PROGRAM

## 2024-02-19 PROCEDURE — 80048 BASIC METABOLIC PNL TOTAL CA: CPT | Performed by: INTERNAL MEDICINE

## 2024-02-19 RX ORDER — METOPROLOL SUCCINATE 25 MG/1
25 TABLET, EXTENDED RELEASE ORAL DAILY
Status: DISCONTINUED | OUTPATIENT
Start: 2024-02-19 | End: 2024-02-23 | Stop reason: HOSPADM

## 2024-02-19 RX ORDER — AMINOPHYLLINE 25 MG/ML
50-100 INJECTION, SOLUTION INTRAVENOUS
Status: DISCONTINUED | OUTPATIENT
Start: 2024-02-19 | End: 2024-02-19

## 2024-02-19 RX ORDER — ACYCLOVIR 200 MG/1
0-1 CAPSULE ORAL
Status: DISCONTINUED | OUTPATIENT
Start: 2024-02-19 | End: 2024-02-19

## 2024-02-19 RX ORDER — ALBUTEROL SULFATE 90 UG/1
2 AEROSOL, METERED RESPIRATORY (INHALATION) EVERY 5 MIN PRN
Status: DISCONTINUED | OUTPATIENT
Start: 2024-02-19 | End: 2024-02-19

## 2024-02-19 RX ORDER — CAFFEINE CITRATE 20 MG/ML
60 SOLUTION INTRAVENOUS
Status: DISCONTINUED | OUTPATIENT
Start: 2024-02-19 | End: 2024-02-19

## 2024-02-19 RX ORDER — REGADENOSON 0.08 MG/ML
0.4 INJECTION, SOLUTION INTRAVENOUS ONCE
Status: COMPLETED | OUTPATIENT
Start: 2024-02-19 | End: 2024-02-19

## 2024-02-19 RX ADMIN — REGADENOSON 0.4 MG: 0.08 INJECTION, SOLUTION INTRAVENOUS at 10:44

## 2024-02-19 RX ADMIN — BUPROPION HYDROCHLORIDE 100 MG: 100 TABLET, FILM COATED ORAL at 21:21

## 2024-02-19 RX ADMIN — EZETIMIBE 5 MG: 10 TABLET ORAL at 08:09

## 2024-02-19 RX ADMIN — METOPROLOL SUCCINATE 25 MG: 25 TABLET, EXTENDED RELEASE ORAL at 21:21

## 2024-02-19 RX ADMIN — CITALOPRAM HYDROBROMIDE 20 MG: 20 TABLET ORAL at 08:09

## 2024-02-19 RX ADMIN — Medication 28.5 MILLICURIE: at 10:42

## 2024-02-19 RX ADMIN — BUPROPION HYDROCHLORIDE 100 MG: 100 TABLET, FILM COATED ORAL at 08:08

## 2024-02-19 RX ADMIN — Medication 8.9 MILLICURIE: at 08:36

## 2024-02-19 RX ADMIN — LISINOPRIL 2.5 MG: 2.5 TABLET ORAL at 08:08

## 2024-02-19 RX ADMIN — METOPROLOL TARTRATE 25 MG: 25 TABLET, FILM COATED ORAL at 08:09

## 2024-02-19 RX ADMIN — FUROSEMIDE 20 MG: 20 TABLET ORAL at 13:33

## 2024-02-19 ASSESSMENT — ACTIVITIES OF DAILY LIVING (ADL)
ADLS_ACUITY_SCORE: 30
ADLS_ACUITY_SCORE: 36
ADLS_ACUITY_SCORE: 30
ADLS_ACUITY_SCORE: 32
ADLS_ACUITY_SCORE: 36
ADLS_ACUITY_SCORE: 32
ADLS_ACUITY_SCORE: 32

## 2024-02-19 NOTE — PLAN OF CARE
Goal Outcome Evaluation:      Plan of Care Reviewed With: patient    Summary: Resp Failure- resolved; Acute systolic CHF   DATE & TIME: 2/18/24 7505-8232  Cognitive Concerns/ Orientation : AOx4   BEHAVIOR & AGGRESSION TOOL COLOR: Green   ABNL VS/O2: VSS on RA  MOBILITY: SBA  PAIN MANAGMENT: Denied  DIET: 2gm Na+; 2000 Fluid restriction- has had about 1400mLs; No Caffeine.  BOWEL/BLADDER: Continent x2   ABNL LAB/BG: K+ 4.4, Mg 2.3, phos 4.7- redraws ordered for AM  DRAIN/DEVICES: R PIV SL,  on strict I/Os  TELEMETRY RHYTHM: BBB  SKIN: WDL  TESTS/PROCEDURES: Stress test ordered for 2/19  D/C DAY/GOALS/PLACE: Pending  OTHER IMPORTANT INFO: Will need to be NPO at midnight and has sign and held pre procedure orders to be released around midnight.

## 2024-02-19 NOTE — PROGRESS NOTES
[facility] Cardiology Progress Note  Date of Service: 02/19/2024  Primary Cardiologist: Rosemarie christian    Teresa Krishna is a 89 year old female with a hx of hypertension, hyperlipidemia, and coronary atherosclerosis based on prior CT calcium score, who was admitted on 2/16/2024 with acute hypoxic resp failure, progressive VALDIVIA x 1 year. Found to have reduced EF on TTE. Pt opted for stress testing > invasive eval for underlying CAD.     Interim Hx: Stress test pending. Denies chest pain. Dyspnea significantly improved. No edema, orthopnea.     Assessment:  Acute heart failure with reduced ejection fraction (LVEF 32%)  Left bundle branch block, presumably chronic  Trivial troponin elevation without significant delta, likely demand related  Hypertension  Moderate to severe aortic stenosis    Plan:   Continue furosemide, lisinopril. Change Lopressor to Toprol XL.  Will defer salome and SGLT2i therapy d/t elderly frailty, marginal BP.   Possibility for angiogram pending Lexiscan outcome.    Plan was formulated under direction of Dr. Callahan.   Pearl Bowen PA-C  [facility] - [facility] Clinic  Pager: [phone]  Text Page  (7:30am - 4pm M-F)   __________________________________________________________________________  Physical Exam   Temp: 97.4  F (36.3  C) Temp src: Oral BP: 109/70 Pulse: 79   Resp: 22 SpO2: 94 % O2 Device: None (Room air)    Vitals:    02/17/24 1428 02/18/24 1145 02/19/24 0711   Weight: 52.9 kg (116 lb 9.6 oz) 54.4 kg (120 lb) 54.2 kg (119 lb 7.8 oz)       GENERAL:  The patient is in no apparent distress.   NECK: CVP appears normal, no masses or thyromegaly.  PULMONARY:  There is a normal respiratory effort. Diffusely diminished lung sounds.   CARDIOVASCULAR:  RRR, normal S1 S2, no m/r/g.  GI:  Non tender abdomen with normoactive bowel sounds and no hepatosplenomegaly. There are no masses palpable.   EXTREMITIES:  No clubbing, cyanosis or edema.  VASCULAR: 2+ Pulses  bilaterally in upper and lower extremities.    Medications    - MEDICATION INSTRUCTIONS -      ACE/ARB/ARNI NOT PRESCRIBED      BETA BLOCKER NOT PRESCRIBED        buPROPion  100 mg Oral BID    citalopram  20 mg Oral QAM    ezetimibe  5 mg Oral QAM    furosemide  20 mg Oral Daily    lisinopril  2.5 mg Oral Daily    metoprolol tartrate  25 mg Oral BID    sodium chloride (PF)  3 mL Intracatheter Q8H       Data   Most Recent 3 CBC's:  Recent Labs   Lab Test 02/18/24  0850 02/17/24  1038 02/16/24  0554   WBC 6.2 6.6 10.3   HGB 11.3* 11.3* 11.7   MCV 90 90 94    245 293     Most Recent 3 BMP's:  Recent Labs   Lab Test 02/19/24  0857 02/18/24  0850 02/17/24  1038    139 141   POTASSIUM 4.5 4.4 4.0   CHLORIDE 103 102 104   CO2 28 27 27   BUN 31.0* 35.8* 30.1*   CR 1.20* 1.28* 1.24*   ANIONGAP 10 10 10   DANIELA 8.7 8.6 8.8   GLC 95 92 100*       A total of 30 minutes was spent face-to-face or coordinating care of Teresa Krishna. Over 50% of our time was spent counseling the patient and/or coordinating care.

## 2024-02-19 NOTE — PLAN OF CARE
DATE & TIME: 2/19/24 AM shift  Cognitive Concerns/ Orientation : AOx4; [place]- Has one hearing aid on right side   BEHAVIOR & AGGRESSION TOOL COLOR: Green             ABNL VS/O2: VSS on RA; denies chest pain. VALDIVIA noted  MOBILITY: SBA; up to the chair for meals.   PAIN MANAGMENT: Denied  DIET: 2gm Na+; 2000 Fluid restriction  BOWEL/BLADDER: continent this shift  ABNL LAB/BG: cr 1.20  DRAIN/DEVICES: R PIV SL,  on strict I/Os  TELEMETRY RHYTHM:   SKIN: dry and flaky; plan to have shower today  TESTS/PROCEDURES: chem stress test completed this shift-results pending- cards should round later today  D/C DAY/GOALS/PLACE: Pending stress test results  OTHER IMPORTANT INFO: daughters are visiting and very supportive.

## 2024-02-19 NOTE — PLAN OF CARE
Goal Outcome Evaluation:  Summary: Resp Failure- resolved; Acute systolic CHF   DATE & TIME: 2/18/24-2/19/24 6988-8153  Cognitive Concerns/ Orientation : AOx4   BEHAVIOR & AGGRESSION TOOL COLOR: Green   ABNL VS/O2: VSS on RA  MOBILITY: SBA  PAIN MANAGMENT: Denied  DIET: 2gm Na+; 2000 Fluid restriction-  No Caffeine. NPO@ midnight  BOWEL/BLADDER: Continent/incontinent   ABNL LAB/BG:   DRAIN/DEVICES: R PIV SL,  on strict I/Os  TELEMETRY RHYTHM: BBB  SKIN: WDL  TESTS/PROCEDURES: Stress test ordered for 2/19  D/C DAY/GOALS/PLACE: Pending  OTHER IMPORTANT INFO:

## 2024-02-19 NOTE — PROGRESS NOTES
[facility]    Hospitalist Progress Note    Brief Summary:  This is a 89-year-old female with history of hypertension, hyperlipidemia, depression, here lungs presented with acute worsening shortness of breath and found to be in acute systolic congestive heart failure and respiratory distress started on BiPAP and is an diuretic and subsequently admitted.    Assessment & Plan        Acute Systolic Congestive heart failure, new diagnosis: Improved  Moderate to severe aortic stenosis   Acute Hypoxic Hypercapnic Respiratory failure: Resolved  Lactic acidosis: Resolved    Pt presented to the ED with severe respiratory distress. Per report she has been having increasing shortness of breath over the past several months.  On the day of admission her respiratory status worsened significantly. She called EMS. Per report she was laying on the floor, minimally responsive. Initial sats in the 70s. Respiratory status improved markedly on BiPAP.   * Initial labs notable for VBG pH of 7.08, pCO2 of 79, and Lactate of 6.4.   * BNP also elevated to 15,783.   * CXR with mild to moderate cardiomegaly with small bilateral pleural effusions and pulmonary edema. No confluent airspace consolidation or pneumothorax.   * COVID, flu, RSV negative.     * TTE was obtained and showed LVEF of 32%. Diastolic doppler findings suggest increased LV filling pressures. There is also moderate to severe aortic stenosis.     She was admitted, cardiology was consulted.  She was started on IV Lasix 60 mg twice daily, she responded very well to the treatment.  She is now on room air, breathing comfortably no sign of fluid overload at this time.  I will stop IV Lasix, as her creatinine already increased to 1.24 from 1.08.  No JVD, no basal crackles, no lower extremity edema, breathing comfortably at room air at this time on 2/17/2024.  Started her on low-dose metoprolol 12.5 mg twice daily, lisinopril 2.5 mg daily, stop IV Lasix and  started on oral Lasix 20 mg daily.  Strict intake and output charting Daily weights,  Lactic acid is now normal.  Was elevated likely because of CHF and pulm edema  Appreciate input from the cardiology, moderate to severe aortic stenosis, not enough to do any intervention at this time  Cardiology is planning to do coronary angiogram.  Agree with uptitrating the metoprolol to 25 mg twice daily.  Continue with Lasix 20 mg daily and lisinopril 2.5 mg daily.  Creatinine slightly improved to 1.20 today.  Continue with the current Lasix and metoprolol lisinopril dose    Patient family decided to go noninvasive stress testing.  She is scheduled for nuclear medicine stress test today.       HTN  -Discontinue amlodipine.  Started on low-dose metoprolol 12.5 mg twice daily and lisinopril 2.5 mg daily.    Increase metoprolol to 25 mg twice daily agree with the      Depression/Anxiety   - Continued PTA bupropion and citalopram      Diet: Combination Diet 2 gm NA Diet; No Caffeine Diet (and additional linked orders)  Fluid restriction 2000 ML FLUID (and additional linked orders)                DVT Prophylaxis: Pneumatic Compression Devices  Code Status: No CPR- Do NOT Intubate    Disposition: Expected discharge in 1 to 2 days if remains stable.    Discussed with the patient, patient daughter at bedside and the nursing staff during care of the patient.    Guillermo Yancey MD, MD  Text Page  (7am - 6pm)    Interval History   Patient slept well last night, no chest pain orthopnea PND or palpitation.  Some mild shortness of breath which is getting better no fever chills cough dysuria materia constipation diarrhea at this time    No other significant event overnight    -Data reviewed today: I reviewed all new labs and imaging results over the last 24 hours. I personally reviewed no images or EKG's today.    Physical Exam   Temp: 97.4  F (36.3  C) Temp src: Oral BP: (!) 140/79 Pulse: 79   Resp: 22 SpO2: 95 % O2 Device: None (Room air)     Vitals:    02/17/24 1428 02/18/24 1145 02/19/24 0711   Weight: 52.9 kg (116 lb 9.6 oz) 54.4 kg (120 lb) 54.2 kg (119 lb 7.8 oz)     Vital Signs with Ranges  Temp:  [97.4  F (36.3  C)-98.2  F (36.8  C)] 97.4  F (36.3  C)  Pulse:  [69-79] 79  Resp:  [18-22] 22  BP: (118-146)/(71-94) 140/79  SpO2:  [94 %-95 %] 95 %  I/O last 3 completed shifts:  In: 600 [P.O.:600]  Out: 1225 [Urine:1225]    Constitutional: awake, alert, cooperative, no apparent distress, and appears stated age  Eyes: Lids and lashes normal, pupils equal, round and reactive to light, extra ocular muscles intact, sclera clear, conjunctiva normal  Respiratory: No increased work of breathing, good air exchange, clear to auscultation bilaterally, no crackles or wheezing  Cardiovascular: Normal apical impulse, regular rate and rhythm, normal S1 and S2, no S3 or S4, and systolic murmur grade 3 positive  GI: No scars, normal bowel sounds, soft, non-distended, non-tender, no masses palpated, no hepatosplenomegally  Skin: no bruising or bleeding  Musculoskeletal: no lower extremity pitting edema present  Neurologic: No focal deficit    Medications    - MEDICATION INSTRUCTIONS -      ACE/ARB/ARNI NOT PRESCRIBED      BETA BLOCKER NOT PRESCRIBED        buPROPion  100 mg Oral BID    citalopram  20 mg Oral QAM    ezetimibe  5 mg Oral QAM    furosemide  20 mg Oral Daily    lisinopril  2.5 mg Oral Daily    metoprolol tartrate  25 mg Oral BID    sodium chloride (PF)  3 mL Intracatheter Q8H       Data   Recent Labs   Lab 02/19/24  0857 02/18/24  0850 02/17/24  1038 02/16/24  0554   WBC  --  6.2 6.6 10.3   HGB  --  11.3* 11.3* 11.7   MCV  --  90 90 94   PLT  --  228 245 293    139 141 141   POTASSIUM 4.5 4.4 4.0 3.8   CHLORIDE 103 102 104 104   CO2 28 27 27 22   BUN 31.0* 35.8* 30.1* 21.6   CR 1.20* 1.28* 1.24* 1.08*   ANIONGAP 10 10 10 15   DANIELA 8.7 8.6 8.8 8.4   GLC 95 92 100* 230*   ALBUMIN  --  3.7  --  3.8   PROTTOTAL  --   --   --  6.3*   BILITOTAL  --   --    --  0.3   ALKPHOS  --   --   --  110   ALT  --   --   --  20   AST  --   --   --  37       No results found for this or any previous visit (from the past 24 hour(s)).

## 2024-02-20 ENCOUNTER — APPOINTMENT (OUTPATIENT)
Dept: PHYSICAL THERAPY | Facility: CLINIC | Age: 89
DRG: 280 | End: 2024-02-20
Attending: INTERNAL MEDICINE
Payer: MEDICARE

## 2024-02-20 LAB
ACT BLD: 429 SECONDS (ref 74–150)
ANION GAP SERPL CALCULATED.3IONS-SCNC: 9 MMOL/L (ref 7–15)
BUN SERPL-MCNC: 27.6 MG/DL (ref 8–23)
CALCIUM SERPL-MCNC: 8.6 MG/DL (ref 8.2–9.6)
CHLORIDE SERPL-SCNC: 104 MMOL/L (ref 98–107)
CHOLEST SERPL-MCNC: 188 MG/DL
CREAT SERPL-MCNC: 1.13 MG/DL (ref 0.51–0.95)
DEPRECATED HCO3 PLAS-SCNC: 27 MMOL/L (ref 22–29)
EGFRCR SERPLBLD CKD-EPI 2021: 45 ML/MIN/1.73M2
GLUCOSE SERPL-MCNC: 96 MG/DL (ref 70–99)
HDLC SERPL-MCNC: 49 MG/DL
LDLC SERPL CALC-MCNC: 115 MG/DL
MAGNESIUM SERPL-MCNC: 2.1 MG/DL (ref 1.7–2.3)
NONHDLC SERPL-MCNC: 139 MG/DL
PHOSPHATE SERPL-MCNC: 4.2 MG/DL (ref 2.5–4.5)
POTASSIUM SERPL-SCNC: 4.2 MMOL/L (ref 3.4–5.3)
SODIUM SERPL-SCNC: 140 MMOL/L (ref 135–145)
TRIGL SERPL-MCNC: 120 MG/DL

## 2024-02-20 PROCEDURE — 93454 CORONARY ARTERY ANGIO S&I: CPT | Performed by: INTERNAL MEDICINE

## 2024-02-20 PROCEDURE — 250N000009 HC RX 250: Performed by: INTERNAL MEDICINE

## 2024-02-20 PROCEDURE — B2111ZZ FLUOROSCOPY OF MULTIPLE CORONARY ARTERIES USING LOW OSMOLAR CONTRAST: ICD-10-PCS | Performed by: INTERNAL MEDICINE

## 2024-02-20 PROCEDURE — 272N000001 HC OR GENERAL SUPPLY STERILE: Performed by: INTERNAL MEDICINE

## 2024-02-20 PROCEDURE — 36415 COLL VENOUS BLD VENIPUNCTURE: CPT | Performed by: INTERNAL MEDICINE

## 2024-02-20 PROCEDURE — C1769 GUIDE WIRE: HCPCS | Performed by: INTERNAL MEDICINE

## 2024-02-20 PROCEDURE — 97530 THERAPEUTIC ACTIVITIES: CPT | Mod: GP | Performed by: PHYSICAL THERAPIST

## 2024-02-20 PROCEDURE — 80048 BASIC METABOLIC PNL TOTAL CA: CPT | Performed by: INTERNAL MEDICINE

## 2024-02-20 PROCEDURE — 258N000003 HC RX IP 258 OP 636: Performed by: HOSPITALIST

## 2024-02-20 PROCEDURE — 84100 ASSAY OF PHOSPHORUS: CPT | Performed by: INTERNAL MEDICINE

## 2024-02-20 PROCEDURE — 250N000011 HC RX IP 250 OP 636: Performed by: INTERNAL MEDICINE

## 2024-02-20 PROCEDURE — 93458 L HRT ARTERY/VENTRICLE ANGIO: CPT | Mod: 26 | Performed by: INTERNAL MEDICINE

## 2024-02-20 PROCEDURE — 250N000013 HC RX MED GY IP 250 OP 250 PS 637: Performed by: PHYSICIAN ASSISTANT

## 2024-02-20 PROCEDURE — 4A023N7 MEASUREMENT OF CARDIAC SAMPLING AND PRESSURE, LEFT HEART, PERCUTANEOUS APPROACH: ICD-10-PCS | Performed by: INTERNAL MEDICINE

## 2024-02-20 PROCEDURE — 250N000013 HC RX MED GY IP 250 OP 250 PS 637: Performed by: STUDENT IN AN ORGANIZED HEALTH CARE EDUCATION/TRAINING PROGRAM

## 2024-02-20 PROCEDURE — 99152 MOD SED SAME PHYS/QHP 5/>YRS: CPT | Mod: GC | Performed by: INTERNAL MEDICINE

## 2024-02-20 PROCEDURE — 93799 UNLISTED CV SVC/PROCEDURE: CPT

## 2024-02-20 PROCEDURE — 93571 IV DOP VEL&/PRESS C FLO 1ST: CPT | Mod: 26 | Performed by: INTERNAL MEDICINE

## 2024-02-20 PROCEDURE — 99233 SBSQ HOSP IP/OBS HIGH 50: CPT | Mod: 25 | Performed by: PHYSICIAN ASSISTANT

## 2024-02-20 PROCEDURE — 250N000011 HC RX IP 250 OP 636: Performed by: PHYSICIAN ASSISTANT

## 2024-02-20 PROCEDURE — 83735 ASSAY OF MAGNESIUM: CPT | Performed by: INTERNAL MEDICINE

## 2024-02-20 PROCEDURE — 97161 PT EVAL LOW COMPLEX 20 MIN: CPT | Mod: GP | Performed by: PHYSICAL THERAPIST

## 2024-02-20 PROCEDURE — C1887 CATHETER, GUIDING: HCPCS | Performed by: INTERNAL MEDICINE

## 2024-02-20 PROCEDURE — 97116 GAIT TRAINING THERAPY: CPT | Mod: GP | Performed by: PHYSICAL THERAPIST

## 2024-02-20 PROCEDURE — 4A033BC MEASUREMENT OF ARTERIAL PRESSURE, CORONARY, PERCUTANEOUS APPROACH: ICD-10-PCS | Performed by: INTERNAL MEDICINE

## 2024-02-20 PROCEDURE — 250N000013 HC RX MED GY IP 250 OP 250 PS 637: Performed by: INTERNAL MEDICINE

## 2024-02-20 PROCEDURE — 99232 SBSQ HOSP IP/OBS MODERATE 35: CPT | Performed by: INTERNAL MEDICINE

## 2024-02-20 PROCEDURE — 93572 IV DOP VEL&/PRESS C FLO EA: CPT | Mod: 26 | Performed by: INTERNAL MEDICINE

## 2024-02-20 PROCEDURE — 120N000001 HC R&B MED SURG/OB

## 2024-02-20 PROCEDURE — 99153 MOD SED SAME PHYS/QHP EA: CPT | Performed by: INTERNAL MEDICINE

## 2024-02-20 PROCEDURE — 93799 UNLISTED CV SVC/PROCEDURE: CPT | Performed by: INTERNAL MEDICINE

## 2024-02-20 PROCEDURE — 82465 ASSAY BLD/SERUM CHOLESTEROL: CPT | Performed by: PHYSICIAN ASSISTANT

## 2024-02-20 PROCEDURE — 85347 COAGULATION TIME ACTIVATED: CPT

## 2024-02-20 PROCEDURE — 99152 MOD SED SAME PHYS/QHP 5/>YRS: CPT | Performed by: INTERNAL MEDICINE

## 2024-02-20 PROCEDURE — C1760 CLOSURE DEV, VASC: HCPCS | Performed by: INTERNAL MEDICINE

## 2024-02-20 PROCEDURE — 93458 L HRT ARTERY/VENTRICLE ANGIO: CPT | Performed by: INTERNAL MEDICINE

## 2024-02-20 DEVICE — CLOSURE ANGIOSEAL 6FR 610130: Type: IMPLANTABLE DEVICE | Status: FUNCTIONAL

## 2024-02-20 RX ORDER — SODIUM CHLORIDE 9 MG/ML
75 INJECTION, SOLUTION INTRAVENOUS CONTINUOUS
Status: ACTIVE | OUTPATIENT
Start: 2024-02-20 | End: 2024-02-20

## 2024-02-20 RX ORDER — ACETAMINOPHEN 325 MG/1
650 TABLET ORAL EVERY 4 HOURS PRN
Status: DISCONTINUED | OUTPATIENT
Start: 2024-02-20 | End: 2024-02-20 | Stop reason: ALTCHOICE

## 2024-02-20 RX ORDER — FENTANYL CITRATE 50 UG/ML
INJECTION, SOLUTION INTRAMUSCULAR; INTRAVENOUS
Status: DISCONTINUED | OUTPATIENT
Start: 2024-02-20 | End: 2024-02-20 | Stop reason: HOSPADM

## 2024-02-20 RX ORDER — NALOXONE HYDROCHLORIDE 0.4 MG/ML
0.2 INJECTION, SOLUTION INTRAMUSCULAR; INTRAVENOUS; SUBCUTANEOUS
Status: ACTIVE | OUTPATIENT
Start: 2024-02-20 | End: 2024-02-21

## 2024-02-20 RX ORDER — FUROSEMIDE 10 MG/ML
40 INJECTION INTRAMUSCULAR; INTRAVENOUS ONCE
Status: COMPLETED | OUTPATIENT
Start: 2024-02-20 | End: 2024-02-20

## 2024-02-20 RX ORDER — LISINOPRIL 2.5 MG/1
2.5 TABLET ORAL ONCE
Status: COMPLETED | OUTPATIENT
Start: 2024-02-20 | End: 2024-02-20

## 2024-02-20 RX ORDER — OXYCODONE HYDROCHLORIDE 5 MG/1
5 TABLET ORAL EVERY 4 HOURS PRN
Status: DISCONTINUED | OUTPATIENT
Start: 2024-02-20 | End: 2024-02-23 | Stop reason: HOSPADM

## 2024-02-20 RX ORDER — NALOXONE HYDROCHLORIDE 0.4 MG/ML
0.4 INJECTION, SOLUTION INTRAMUSCULAR; INTRAVENOUS; SUBCUTANEOUS
Status: ACTIVE | OUTPATIENT
Start: 2024-02-20 | End: 2024-02-21

## 2024-02-20 RX ORDER — ASPIRIN 325 MG
325 TABLET ORAL ONCE
Status: CANCELLED | OUTPATIENT
Start: 2024-02-20 | End: 2024-02-20

## 2024-02-20 RX ORDER — ASPIRIN 81 MG/1
243 TABLET, CHEWABLE ORAL ONCE
Status: CANCELLED | OUTPATIENT
Start: 2024-02-20

## 2024-02-20 RX ORDER — FLUMAZENIL 0.1 MG/ML
0.2 INJECTION, SOLUTION INTRAVENOUS
Status: ACTIVE | OUTPATIENT
Start: 2024-02-20 | End: 2024-02-21

## 2024-02-20 RX ORDER — FENTANYL CITRATE 0.05 MG/ML
25 INJECTION, SOLUTION INTRAMUSCULAR; INTRAVENOUS
Status: DISCONTINUED | OUTPATIENT
Start: 2024-02-20 | End: 2024-02-22

## 2024-02-20 RX ORDER — POTASSIUM CHLORIDE 1500 MG/1
20 TABLET, EXTENDED RELEASE ORAL
Status: CANCELLED | OUTPATIENT
Start: 2024-02-20

## 2024-02-20 RX ORDER — LORAZEPAM 2 MG/ML
0.5 INJECTION INTRAMUSCULAR
Status: CANCELLED | OUTPATIENT
Start: 2024-02-20

## 2024-02-20 RX ORDER — LORAZEPAM 0.5 MG/1
0.5 TABLET ORAL
Status: CANCELLED | OUTPATIENT
Start: 2024-02-20

## 2024-02-20 RX ORDER — ATORVASTATIN CALCIUM 10 MG/1
10 TABLET, FILM COATED ORAL EVERY EVENING
Status: DISCONTINUED | OUTPATIENT
Start: 2024-02-20 | End: 2024-02-23 | Stop reason: HOSPADM

## 2024-02-20 RX ORDER — ATROPINE SULFATE 0.1 MG/ML
0.5 INJECTION INTRAVENOUS
Status: ACTIVE | OUTPATIENT
Start: 2024-02-20 | End: 2024-02-21

## 2024-02-20 RX ORDER — ASPIRIN 81 MG/1
81 TABLET ORAL DAILY
Status: DISCONTINUED | OUTPATIENT
Start: 2024-02-20 | End: 2024-02-23 | Stop reason: HOSPADM

## 2024-02-20 RX ORDER — LISINOPRIL 5 MG/1
5 TABLET ORAL DAILY
Status: DISCONTINUED | OUTPATIENT
Start: 2024-02-21 | End: 2024-02-23 | Stop reason: HOSPADM

## 2024-02-20 RX ORDER — HEPARIN SODIUM 1000 [USP'U]/ML
INJECTION, SOLUTION INTRAVENOUS; SUBCUTANEOUS
Status: DISCONTINUED | OUTPATIENT
Start: 2024-02-20 | End: 2024-02-20 | Stop reason: HOSPADM

## 2024-02-20 RX ORDER — IOPAMIDOL 755 MG/ML
INJECTION, SOLUTION INTRAVASCULAR
Status: DISCONTINUED | OUTPATIENT
Start: 2024-02-20 | End: 2024-02-20 | Stop reason: HOSPADM

## 2024-02-20 RX ORDER — LIDOCAINE 40 MG/G
CREAM TOPICAL
Status: CANCELLED | OUTPATIENT
Start: 2024-02-20

## 2024-02-20 RX ADMIN — SODIUM CHLORIDE 75 ML/HR: 9 INJECTION, SOLUTION INTRAVENOUS at 17:59

## 2024-02-20 RX ADMIN — METOPROLOL SUCCINATE 25 MG: 25 TABLET, EXTENDED RELEASE ORAL at 08:46

## 2024-02-20 RX ADMIN — CITALOPRAM HYDROBROMIDE 20 MG: 20 TABLET ORAL at 08:45

## 2024-02-20 RX ADMIN — BUPROPION HYDROCHLORIDE 100 MG: 100 TABLET, FILM COATED ORAL at 08:46

## 2024-02-20 RX ADMIN — EZETIMIBE 5 MG: 10 TABLET ORAL at 08:45

## 2024-02-20 RX ADMIN — FUROSEMIDE 40 MG: 10 INJECTION, SOLUTION INTRAMUSCULAR; INTRAVENOUS at 11:00

## 2024-02-20 RX ADMIN — ATORVASTATIN CALCIUM 10 MG: 10 TABLET, FILM COATED ORAL at 20:07

## 2024-02-20 RX ADMIN — LISINOPRIL 2.5 MG: 2.5 TABLET ORAL at 11:01

## 2024-02-20 RX ADMIN — ACETAMINOPHEN 1000 MG: 500 TABLET, FILM COATED ORAL at 14:00

## 2024-02-20 RX ADMIN — FUROSEMIDE 20 MG: 20 TABLET ORAL at 08:45

## 2024-02-20 RX ADMIN — BUPROPION HYDROCHLORIDE 100 MG: 100 TABLET, FILM COATED ORAL at 20:07

## 2024-02-20 RX ADMIN — ASPIRIN 81 MG: 81 TABLET, COATED ORAL at 11:01

## 2024-02-20 RX ADMIN — LISINOPRIL 2.5 MG: 2.5 TABLET ORAL at 08:45

## 2024-02-20 ASSESSMENT — ACTIVITIES OF DAILY LIVING (ADL)
ADLS_ACUITY_SCORE: 32

## 2024-02-20 ASSESSMENT — EJECTION FRACTION: EF_VALUE: .26

## 2024-02-20 NOTE — PLAN OF CARE
Summary: Resp Failure- resolved; Acute systolic CHF   DATE & TIME: 2/20/24 6120-2612  Cognitive Concerns/ Orientation : A&Ox4. Deaf in L ear, very [place] in R Ear with impaired hearing aid  BEHAVIOR & AGGRESSION TOOL COLOR: Green   ABNL VS/O2: VSS on 1L NC, VALDIVIA   MOBILITY: SBA w/GB, bedrest till 2130, purwick in place  PAIN MANAGMENT: Denies   DIET: Reg, tolerate clears  BOWEL/BLADDER: Continent  ABNL LAB/BG: angiogram done this shift  DRAIN/DEVICES: Ns running at 75 mL/hr till 20:00 today  TELEMETRY RHYTHM: NSR w/ BBB  SKIN: pale otherwise intact  TESTS/PROCEDURES: Cardiology following  D/C DAY/GOALS/PLACE: Pending work up and plan.   OTHER IMPORTANT INFO: Right groin site had slight bleeding after angio. Site is soft to touch, CMS intact, pedal pulses are +2. No more bleeding observed end of shift, MD notified

## 2024-02-20 NOTE — PROGRESS NOTES
[facility]    Hospitalist Progress Note    Brief Summary:  This is a 89-year-old female with history of hypertension, hyperlipidemia, depression, here lungs presented with acute worsening shortness of breath and found to be in acute systolic congestive heart failure and respiratory distress started on BiPAP and is an diuretic and subsequently admitted.    Assessment & Plan        Acute Systolic Congestive heart failure, new diagnosis: Improved  Moderate to severe aortic stenosis   Acute Hypoxic Hypercapnic Respiratory failure: Resolved  Lactic acidosis: Resolved    Pt presented to the ED with severe respiratory distress. Per report she has been having increasing shortness of breath over the past several months.  On the day of admission her respiratory status worsened significantly. She called EMS. Per report she was laying on the floor, minimally responsive. Initial sats in the 70s. Respiratory status improved markedly on BiPAP.   * Initial labs notable for VBG pH of 7.08, pCO2 of 79, and Lactate of 6.4.   * BNP also elevated to 15,783.   * CXR with mild to moderate cardiomegaly with small bilateral pleural effusions and pulmonary edema. No confluent airspace consolidation or pneumothorax.   * COVID, flu, RSV negative.     * TTE was obtained and showed LVEF of 32%. Diastolic doppler findings suggest increased LV filling pressures. There is also moderate to severe aortic stenosis.     She was admitted, cardiology was consulted.  She was started on IV Lasix 60 mg twice daily, she responded very well to the treatment.  She is now on room air, breathing comfortably no sign of fluid overload at this time.  I will stop IV Lasix, as her creatinine already increased to 1.24 from 1.08.  No JVD, no basal crackles, no lower extremity edema, breathing comfortably at room air at this time on 2/17/2024.  Started her on low-dose metoprolol 12.5 mg twice daily, lisinopril 2.5 mg daily, stop IV Lasix and  started on oral Lasix 20 mg daily.  Strict intake and output charting Daily weights,  Lactic acid is now normal.  Was elevated likely because of CHF and pulm edema  Appreciate input from the cardiology, moderate to severe aortic stenosis, not enough to do any intervention at this time  Cardiology is planning to do coronary angiogram.  Agree with uptitrating the metoprolol to 25 mg twice daily.  Continue with Lasix 20 mg daily and lisinopril 2.5 mg daily.  Creatinine slightly improved to 1.20, today's labs are still pending at this time..  Continue with the current Lasix and metoprolol lisinopril dose, if creatinine improved we may increase the dose of lisinopril to 5 mg daily    Nuclear medicine stress test abnormal, small area of nontransmural infarction in the mid to distal anteroseptal segment with mild degree of teja-infarct ischemia.  Will await for cardiology evaluation if she needs any coronary angiogram.       HTN  -Discontinue amlodipine.  Started on low-dose metoprolol 12.5 mg twice daily and lisinopril 2.5 mg daily.    Increase metoprolol to 25 mg twice daily agree with the  If creatinine improved, will increase the lisinopril to 5 mg.      Depression/Anxiety   - Continued PTA bupropion and citalopram      Diet: Combination Diet 2 gm NA Diet; No Caffeine Diet (and additional linked orders)  Fluid restriction 2000 ML FLUID (and additional linked orders)                DVT Prophylaxis: Pneumatic Compression Devices  Code Status: No CPR- Do NOT Intubate    Disposition: Expected discharge in 1 to 2 days if remains stable.  After cardiology evaluation    Discussed with the patient and the nursing staff during care of the patient.    Guillermo Yancey MD, MD  Text Page  (7am - 6pm)    Interval History   Patient seen and evaluated this morning, overall feeling well, some mild shortness of breath, denies any chest pain fever chills headache dizziness lightheadedness no orthopnea PND or palpitation.  Slept well  overnight    No other significant event    -Data reviewed today: I reviewed all new labs and imaging results over the last 24 hours. I personally reviewed no images or EKG's today.    Physical Exam   Temp: 98.1  F (36.7  C) Temp src: Oral BP: (!) 159/101 Pulse: 81   Resp: 16 SpO2: 92 % O2 Device: Nasal cannula Oxygen Delivery: 1 LPM  Vitals:    02/18/24 1145 02/19/24 0711 02/20/24 0643   Weight: 54.4 kg (120 lb) 54.2 kg (119 lb 7.8 oz) 54.9 kg (121 lb)     Vital Signs with Ranges  Temp:  [97.6  F (36.4  C)-98.1  F (36.7  C)] 98.1  F (36.7  C)  Pulse:  [69-85] 81  Resp:  [16-22] 16  BP: (109-159)/() 159/101  SpO2:  [90 %-97 %] 92 %  I/O last 3 completed shifts:  In: 300 [P.O.:300]  Out: -     Constitutional: awake, alert, cooperative, no apparent distress, and appears stated age  Eyes: Lids and lashes normal, pupils equal, round and reactive to light, extra ocular muscles intact, sclera clear, conjunctiva normal  Respiratory: No increased work of breathing, good air exchange, clear to auscultation bilaterally, no crackles or wheezing  Cardiovascular: Normal apical impulse, regular rate and rhythm, normal S1 and S2, no S3 or S4, and systolic murmur grade 3 positive  GI: No scars, normal bowel sounds, soft, non-distended, non-tender, no masses palpated, no hepatosplenomegally  Skin: no bruising or bleeding  Musculoskeletal: no lower extremity pitting edema present  Neurologic: No focal deficit    Medications    - MEDICATION INSTRUCTIONS -      ACE/ARB/ARNI NOT PRESCRIBED      BETA BLOCKER NOT PRESCRIBED        buPROPion  100 mg Oral BID    citalopram  20 mg Oral QAM    ezetimibe  5 mg Oral QAM    furosemide  20 mg Oral Daily    lisinopril  2.5 mg Oral Daily    metoprolol succinate ER  25 mg Oral Daily    sodium chloride (PF)  3 mL Intracatheter Q8H       Data   Recent Labs   Lab 02/19/24  0857 02/18/24  0850 02/17/24  1038 02/16/24  0554   WBC  --  6.2 6.6 10.3   HGB  --  11.3* 11.3* 11.7   MCV  --  90 90 94    PLT  --  228 245 293    139 141 141   POTASSIUM 4.5 4.4 4.0 3.8   CHLORIDE 103 102 104 104   CO2 28 27 27 22   BUN 31.0* 35.8* 30.1* 21.6   CR 1.20* 1.28* 1.24* 1.08*   ANIONGAP 10 10 10 15   DANIELA 8.7 8.6 8.8 8.4   GLC 95 92 100* 230*   ALBUMIN  --  3.7  --  3.8   PROTTOTAL  --   --   --  6.3*   BILITOTAL  --   --   --  0.3   ALKPHOS  --   --   --  110   ALT  --   --   --  20   AST  --   --   --  37       Recent Results (from the past 24 hour(s))   NM Lexiscan stress test (nuc card)   Result Value    Target     Baseline Systolic     Baseline Diastolic BP 90    Last Stress Systolic     Last Stress Diastolic BP 80    Baseline HR 70    Max HR  78    Max Predicted HR  61    Rate Pressure Product 10,920.0    Left Ventricular EF 27    Narrative      The nuclear stress test is abnormal.    There is a small area of nontransmural infarction in the mid to distal   anteroseptal segment(s) of the left ventricle associated with a mild   degree of teja-infarct ischemia.    Left ventricular function is severely reduced. The left ventricular   ejection fraction at stress is 27%.    There is no prior study for comparison.

## 2024-02-20 NOTE — PROGRESS NOTES
02/20/24 0958   Appointment Info   Signing Clinician's Name / Credentials (PT) Bekah Steinberg PT   Rehab Comments (PT) On 1L; may have angiogram   Living Environment   People in Home alone   Current Living Arrangements house   Home Accessibility no concerns   Transportation Anticipated family or friend will provide   Living Environment Comments Meets all needs on one level. Has 6 involved children. Ind with ADLs at baseline, no AD (has walker her spouse used). Has walk in shower. Retired RN.   Self-Care   Usual Activity Tolerance good   Current Activity Tolerance fair   Equipment Currently Used at Home cane, straight;grab bar, toilet;grab bar, tub/shower;shower chair   Fall history within last six months yes   Number of times patient has fallen within last six months 1  (pt. found on floor by EMS)   Activity/Exercise/Self-Care Comment ind with ADLs, no AD except sometimes when goes out uses SEC   General Information   Onset of Illness/Injury or Date of Surgery 02/16/24   Referring Physician Guillermo Yancey MD   Patient/Family Therapy Goals Statement (PT) Daughter present; reports patient and family hope for patient to be able to go straight home at D/C   Pertinent History of Current Problem (include personal factors and/or comorbidities that impact the POC) This is a 89-year-old female with history of hypertension, hyperlipidemia, depression, here lungs presented with acute worsening shortness of breath and found to be in acute systolic congestive heart failure and respiratory distress started on BiPAP and is an diuretic and subsequently admitted. Currently on 1L of O2.   Existing Precautions/Restrictions fall;oxygen therapy device and L/min   Heart Disease Risk Factors High blood pressure   General Observations Long sitting in bed; reports off and on increased SOB even in bed. Nursing aware and had placed 1L. States it would be good for PT to see.   Cognition   Affect/Mental Status (Cognition) WFL    Orientation Status (Cognition) oriented x 3   Follows Commands (Cognition) follows one-step commands;75-90% accuracy  (limited by extreme [place])   Pain Assessment   Patient Currently in Pain No   Integumentary/Edema   Integumentary/Edema no deficits were identifed   Posture    Posture Forward head position   Range of Motion (ROM)   Range of Motion ROM is WFL   Strength (Manual Muscle Testing)   Strength (Manual Muscle Testing) Deficits observed during functional mobility   Strength Comments generalized deconditioning and extremety strength appeats 4/5   Bed Mobility   Comment, (Bed Mobility) Supine with HOB raised to EOB with CGA; does not want HOB flat; SOB   Transfers   Comment, (Transfers) Sit to stand with Min A of 1; HHA   Gait/Stairs (Locomotion)   Comment, (Gait/Stairs) Ambulated 5' for eval with HHA/Min A; unsteadinesss noted.   Balance   Balance Comments Compromised currently; weak; fall risk   Sensory Examination   Sensory Perception patient reports no sensory changes   Coordination   Coordination no deficits were identified   Muscle Tone   Muscle Tone no deficits were identified   Clinical Impression   Criteria for Skilled Therapeutic Intervention Yes, treatment indicated   PT Diagnosis (PT) Impaired functional mobility and endurance   Influenced by the following impairments SOB, weakness, dizziness   Functional limitations due to impairments decreased I and endurance in functional mobility   Clinical Presentation (PT Evaluation Complexity) evolving   Clinical Presentation Rationale clinical judgement   Clinical Decision Making (Complexity) low complexity   Planned Therapy Interventions (PT) balance training;bed mobility training;gait training;home exercise program;patient/family education;strengthening;transfer training;progressive activity/exercise   Risk & Benefits of therapy have been explained evaluation/treatment results reviewed;care plan/treatment goals reviewed;risks/benefits  reviewed;current/potential barriers reviewed;participants voiced agreement with care plan;participants included;patient;daughter   PT Total Evaluation Time   PT Eval, Low Complexity Minutes (21031) 12   Physical Therapy Goals   PT Frequency Daily  (wants to return home)   PT Predicted Duration/Target Date for Goal Attainment 02/27/24   PT: Bed Mobility Independent;Supine to/from sit   PT: Transfers Modified independent;Sit to/from stand;Bed to/from chair   PT: Gait Modified independent;Rolling walker;Greater than 200 feet   Therapeutic Activity   Therapeutic Activities: dynamic activities to improve functional performance Minutes (83316) 14   Symptoms Noted During/After Treatment Fatigue;Shortness of breath   Treatment Detail/Skilled Intervention Patient long sitting in bed; daughter present. Patient very [place]. On 1L. Moved to EOB with CGA; deferred lowering bed; too uncomfortable for patient's breathing. Rested in sit. STS with Min A/HHA of 1; mild unsteadiness noted. At end of session, eager to sit; CGA. Layed back with CGA and HOB raised. Able to repositon and scoot herself up in bed. O2 sats after waling at 95% on 1L, HR 95. BP took 3 attempts, repositioning and nurse present; eventually read 165/106. Nursing aware and following.   Gait Training   Gait Training Minutes (10181) 10   Symptoms Noted During/After Treatment (Gait Training) dizziness;fatigue;shortness of breath   Treatment Detail/Skilled Intervention Patient ambulated 50' with HHA/Min A; decreased karin and step length; very guarded and notable instability; increased SOB with activity and towards end of walk began to feel dizzy; resolved upon returning to bed. JOANA brought in and educated patient/daughter that this would be indicated to use with staff and PT will be working with it as well.   Distance in Feet 50'   PT Discharge Planning   PT Plan transfers, gait with WW, strengthening   PT Discharge Recommendation (DC Rec) home with assist;home with  home care physical therapy;Transitional Care Facility   PT Rationale for DC Rec Patient is currently requiring A of 1 and is significantly below baseline of I without AD. Very SOB  with this and dizziness limiting her mobility. Daughter states that family would like to provide A and could rotate and the preference would be to discharge home. WOuld likely benefit from home PT. If remains significantly weak/SOB and/or family cannot provide A, may need TCU.   PT Brief overview of current status CGA to EOB with HOB elevated; Min A to stand; Min HHA; very weak and SOB; sats good; BP high   PT Equipment Needed at Discharge walker, rolling   Total Session Time   Timed Code Treatment Minutes 24   Total Session Time (sum of timed and untimed services) 36

## 2024-02-20 NOTE — PRE-PROCEDURE
GENERAL PRE-PROCEDURE:   Procedure:  Heart catheterization possible intervention    Written consent obtained?: Yes    Risks and benefits: Risks, benefits and alternatives were discussed    Consent given by:  Patient  Patient states understanding of procedure being performed: Yes    Patient's understanding of procedure matches consent: Yes    Procedure consent matches procedure scheduled: Yes    Expected level of sedation:  Moderate  Appropriately NPO:  Yes  Lungs:  Lungs clear with good breath sounds bilaterally  Heart:  Normal heart sounds and rate  History & Physical reviewed:  History and physical reviewed and no updates needed  Statement of review:  I have reviewed the lab findings, diagnostic data, medications, and the plan for sedation  Pt with age 89  [place]  echo mod/sev aortic stenosis  She has rescinded dnr and is  full code for this  Risk benefit indication discussed with pt and  she wishes to proceed  Mi death emergent surgery renal risk vascular risk cva  Dapt if stent--pt agrees

## 2024-02-20 NOTE — PROGRESS NOTES
[facility] Cardiology Progress Note  Date of Service: 02/20/2024  Primary Cardiologist: Rosemarie christian    Teresa Krishna is a 89 year old female with a hx of hypertension, hyperlipidemia, and coronary atherosclerosis based on prior CT calcium score, who was admitted on 2/16/2024 with acute hypoxic resp failure, progressive VALDIVIA x 1 year. Found to have reduced EF on TTE. Pt opted for stress testing > invasive eval for underlying CAD.     Interim Hx: Stress test yesterday showed small area of mid-distal anteroseptal infarct with mid teja-infarct ischemia. Denies chest pain. A little more dyspneic today than yesterday, also hypertensive. No edema, orthopnea.     Assessment:  Acute heart failure with reduced ejection fraction (LVEF 32%)  Left bundle branch block, presumably chronic  Trivial troponin elevation without significant delta, likely demand related  Hypertension  Moderate to severe aortic stenosis    Plan:   Discussed coronary angiogram/possible PCI with pt/dtr today, who are in agreement to proceed. They also agree with temporary DNR/DNI reversal for procedure.   Risks and benefits of left heart catheterization and coronary angiogram were discussed with the patient in detail. 0.1-0.3% (for diagnostic angio) and 1-2% (for PCI)  risk of stroke, MI, death, emergent bypass for diagnostic angio, risk of contrast induced allergic reaction, renal dysfunction, vascular complications were discussed. Patient understands and wishes to proceed. The patient would be an appropriate candidate for DAPT, if required.   Increase lisinopril from 2.5 to 5 mg daily.   Give a dose of IV Lasix 40 mg now.  Start aspirin and statin. Check lipid panel.  Continue oral furosemide, Toprol XL.  Will defer salome and SGLT2i therapy d/t elderly frailty, marginal BP.     Plan was formulated under direction of Dr. Callahan.   Pearl Bowen PA-C  [facility] - Heart Clinic  Pager: [phone]  Text Page  (7:30am -  4pm M-F)   __________________________________________________________________________  Physical Exam   Temp: 98.1  F (36.7  C) Temp src: Oral BP: (!) 159/101 Pulse: 81   Resp: 16 SpO2: 90 % O2 Device: None (Room air)    Vitals:    02/18/24 1145 02/19/24 0711 02/20/24 0643   Weight: 54.4 kg (120 lb) 54.2 kg (119 lb 7.8 oz) 54.9 kg (121 lb)       GENERAL:  The patient is in no apparent distress.   NECK: CVP appears normal, no masses or thyromegaly.  PULMONARY:  There is a normal respiratory effort. Diffusely diminished lung sounds.   CARDIOVASCULAR:  RRR, normal S1 S2, no m/r/g.  GI:  Non tender abdomen with normoactive bowel sounds and no hepatosplenomegaly. There are no masses palpable.   EXTREMITIES:  No clubbing, cyanosis or edema.  VASCULAR: 2+ Pulses bilaterally in upper and lower extremities.    Medications    - MEDICATION INSTRUCTIONS -      ACE/ARB/ARNI NOT PRESCRIBED      BETA BLOCKER NOT PRESCRIBED        buPROPion  100 mg Oral BID    citalopram  20 mg Oral QAM    ezetimibe  5 mg Oral QAM    furosemide  20 mg Oral Daily    lisinopril  2.5 mg Oral Daily    metoprolol succinate ER  25 mg Oral Daily    sodium chloride (PF)  3 mL Intracatheter Q8H       Data   Most Recent 3 CBC's:  Recent Labs   Lab Test 02/18/24  0850 02/17/24  1038 02/16/24  0554   WBC 6.2 6.6 10.3   HGB 11.3* 11.3* 11.7   MCV 90 90 94    245 293     Most Recent 3 BMP's:  Recent Labs   Lab Test 02/19/24  0857 02/18/24  0850 02/17/24  1038    139 141   POTASSIUM 4.5 4.4 4.0   CHLORIDE 103 102 104   CO2 28 27 27   BUN 31.0* 35.8* 30.1*   CR 1.20* 1.28* 1.24*   ANIONGAP 10 10 10   DANIELA 8.7 8.6 8.8   GLC 95 92 100*       A total of 30 minutes was spent face-to-face or coordinating care of Teresa Krishna. Over 50% of our time was spent counseling the patient and/or coordinating care.

## 2024-02-20 NOTE — PLAN OF CARE
Summary: Resp Failure- resolved; Acute systolic CHF   DATE & TIME: 2/19-02/20/24 Night shift  Cognitive Concerns/ Orientation : A&Ox4. Deaf in L ear, very [place] in R Ear with impaired hearing aid. Calm, cooperative, and pleasant.   BEHAVIOR & AGGRESSION TOOL COLOR: Green   ABNL VS/O2: VSS on RA. VALDIVIA   MOBILITY: SBA, moves well.   PAIN MANAGMENT: Denied  DIET: NPO@ midnight for possible cardiac procedure.   BOWEL/BLADDER: Continent this shift. Using bathroom.   ABNL LAB/BG: NM [place]-Scan abnormal.   DRAIN/DEVICES: R PIV SL,  Strict I/Os  TELEMETRY RHYTHM: BBB  SKIN: WDL, pale in color. Showered with assistance last evening.   TESTS/PROCEDURES: AM labs, possible angiogram; NPO orders placed for MN.   D/C DAY/GOALS/PLACE: Pending work up and plan.   OTHER IMPORTANT INFO: Daughter will be back to assist with pt. Bed alarm on for safety. Calls as needed.       Goal Outcome Evaluation:

## 2024-02-20 NOTE — PLAN OF CARE
"Summary: Resp Failure- resolved; Acute systolic CHF   DATE & TIME: 2/19/24 Evenings   Cognitive Concerns/ Orientation : A&Ox4. Deaf in L ear, very [place] in R Ear with impaired hearing aid. Calm, cooperative, and pleasant.   BEHAVIOR & AGGRESSION TOOL COLOR: Green   ABNL VS/O2: VSS on RA. VALDIVIA   MOBILITY: SBA, moves well.   PAIN MANAGMENT: Denied  DIET: 2gm Na+; 2000 Fluid restriction-  No Caffeine. NPO@ midnight for possible cardiac procedure.   BOWEL/BLADDER: Continent this shift. Using bathroom.   ABNL LAB/BG: Urea 31, Crea 1.20, GFR 42, NM Clarita-Scan abnormal.   DRAIN/DEVICES: R PIV SL,  Strict I/Os  TELEMETRY RHYTHM: BBB  SKIN: WDL, pale in color. Showered with assistance this evening. Tele patches changed.   TESTS/PROCEDURES: AM labs, possible cardiac intervention. NPO orders placed for MN.   D/C DAY/GOALS/PLACE: Pending work up and plan.   OTHER IMPORTANT INFO: Family at bedside. Supportive of pt. Asking if \"cardiology will see us this evening?\" Provider did not round this evening. Daughter will be back in AM to assist with pt. Bed alarm on for safety. Calls as needed.                       "

## 2024-02-21 ENCOUNTER — APPOINTMENT (OUTPATIENT)
Dept: PHYSICAL THERAPY | Facility: CLINIC | Age: 89
DRG: 280 | End: 2024-02-21
Payer: MEDICARE

## 2024-02-21 ENCOUNTER — APPOINTMENT (OUTPATIENT)
Dept: OCCUPATIONAL THERAPY | Facility: CLINIC | Age: 89
DRG: 280 | End: 2024-02-21
Payer: MEDICARE

## 2024-02-21 ENCOUNTER — APPOINTMENT (OUTPATIENT)
Dept: GENERAL RADIOLOGY | Facility: CLINIC | Age: 89
DRG: 280 | End: 2024-02-21
Attending: INTERNAL MEDICINE
Payer: MEDICARE

## 2024-02-21 LAB
ANION GAP SERPL CALCULATED.3IONS-SCNC: 16 MMOL/L (ref 7–15)
BACTERIA BLD CULT: NO GROWTH
BACTERIA BLD CULT: NO GROWTH
BASOPHILS # BLD AUTO: 0 10E3/UL (ref 0–0.2)
BASOPHILS NFR BLD AUTO: 0 %
BUN SERPL-MCNC: 34.2 MG/DL (ref 8–23)
CALCIUM SERPL-MCNC: 8.6 MG/DL (ref 8.2–9.6)
CHLORIDE SERPL-SCNC: 101 MMOL/L (ref 98–107)
CREAT SERPL-MCNC: 1.3 MG/DL (ref 0.51–0.95)
DEPRECATED HCO3 PLAS-SCNC: 24 MMOL/L (ref 22–29)
EGFRCR SERPLBLD CKD-EPI 2021: 38 ML/MIN/1.73M2
EOSINOPHIL # BLD AUTO: 0 10E3/UL (ref 0–0.7)
EOSINOPHIL NFR BLD AUTO: 0 %
ERYTHROCYTE [DISTWIDTH] IN BLOOD BY AUTOMATED COUNT: 14.6 % (ref 10–15)
GLUCOSE BLDC GLUCOMTR-MCNC: 171 MG/DL (ref 70–99)
GLUCOSE SERPL-MCNC: 65 MG/DL (ref 70–99)
HCT VFR BLD AUTO: 35.7 % (ref 35–47)
HGB BLD-MCNC: 11.9 G/DL (ref 11.7–15.7)
IMM GRANULOCYTES # BLD: 0 10E3/UL
IMM GRANULOCYTES NFR BLD: 0 %
LYMPHOCYTES # BLD AUTO: 0.8 10E3/UL (ref 0.8–5.3)
LYMPHOCYTES NFR BLD AUTO: 11 %
MAGNESIUM SERPL-MCNC: 2.3 MG/DL (ref 1.7–2.3)
MCH RBC QN AUTO: 29.7 PG (ref 26.5–33)
MCHC RBC AUTO-ENTMCNC: 33.3 G/DL (ref 31.5–36.5)
MCV RBC AUTO: 89 FL (ref 78–100)
MONOCYTES # BLD AUTO: 0.7 10E3/UL (ref 0–1.3)
MONOCYTES NFR BLD AUTO: 9 %
NEUTROPHILS # BLD AUTO: 5.8 10E3/UL (ref 1.6–8.3)
NEUTROPHILS NFR BLD AUTO: 80 %
NRBC # BLD AUTO: 0 10E3/UL
NRBC BLD AUTO-RTO: 0 /100
NT-PROBNP SERPL-MCNC: ABNORMAL PG/ML (ref 0–1800)
PHOSPHATE SERPL-MCNC: 4.9 MG/DL (ref 2.5–4.5)
PLATELET # BLD AUTO: 230 10E3/UL (ref 150–450)
POTASSIUM SERPL-SCNC: 3.9 MMOL/L (ref 3.4–5.3)
RBC # BLD AUTO: 4.01 10E6/UL (ref 3.8–5.2)
SODIUM SERPL-SCNC: 141 MMOL/L (ref 135–145)
WBC # BLD AUTO: 7.4 10E3/UL (ref 4–11)

## 2024-02-21 PROCEDURE — 36415 COLL VENOUS BLD VENIPUNCTURE: CPT | Performed by: INTERNAL MEDICINE

## 2024-02-21 PROCEDURE — 97116 GAIT TRAINING THERAPY: CPT | Mod: GP

## 2024-02-21 PROCEDURE — 250N000013 HC RX MED GY IP 250 OP 250 PS 637: Performed by: STUDENT IN AN ORGANIZED HEALTH CARE EDUCATION/TRAINING PROGRAM

## 2024-02-21 PROCEDURE — 250N000013 HC RX MED GY IP 250 OP 250 PS 637: Performed by: HOSPITALIST

## 2024-02-21 PROCEDURE — 83880 ASSAY OF NATRIURETIC PEPTIDE: CPT | Performed by: PHYSICIAN ASSISTANT

## 2024-02-21 PROCEDURE — 83735 ASSAY OF MAGNESIUM: CPT | Performed by: INTERNAL MEDICINE

## 2024-02-21 PROCEDURE — 99233 SBSQ HOSP IP/OBS HIGH 50: CPT | Mod: FS | Performed by: PHYSICIAN ASSISTANT

## 2024-02-21 PROCEDURE — 85004 AUTOMATED DIFF WBC COUNT: CPT | Performed by: INTERNAL MEDICINE

## 2024-02-21 PROCEDURE — 80048 BASIC METABOLIC PNL TOTAL CA: CPT | Performed by: INTERNAL MEDICINE

## 2024-02-21 PROCEDURE — 99232 SBSQ HOSP IP/OBS MODERATE 35: CPT | Performed by: INTERNAL MEDICINE

## 2024-02-21 PROCEDURE — 250N000013 HC RX MED GY IP 250 OP 250 PS 637: Performed by: PHYSICIAN ASSISTANT

## 2024-02-21 PROCEDURE — 84100 ASSAY OF PHOSPHORUS: CPT | Performed by: INTERNAL MEDICINE

## 2024-02-21 PROCEDURE — 36415 COLL VENOUS BLD VENIPUNCTURE: CPT | Performed by: PHYSICIAN ASSISTANT

## 2024-02-21 PROCEDURE — 250N000011 HC RX IP 250 OP 636: Performed by: PHYSICIAN ASSISTANT

## 2024-02-21 PROCEDURE — 73110 X-RAY EXAM OF WRIST: CPT | Mod: LT

## 2024-02-21 PROCEDURE — 250N000013 HC RX MED GY IP 250 OP 250 PS 637: Performed by: INTERNAL MEDICINE

## 2024-02-21 PROCEDURE — 97535 SELF CARE MNGMENT TRAINING: CPT | Mod: GO

## 2024-02-21 PROCEDURE — 120N000001 HC R&B MED SURG/OB

## 2024-02-21 PROCEDURE — 97530 THERAPEUTIC ACTIVITIES: CPT | Mod: GP

## 2024-02-21 RX ORDER — FUROSEMIDE 10 MG/ML
40 INJECTION INTRAMUSCULAR; INTRAVENOUS ONCE
Status: COMPLETED | OUTPATIENT
Start: 2024-02-21 | End: 2024-02-21

## 2024-02-21 RX ADMIN — ASPIRIN 81 MG: 81 TABLET, COATED ORAL at 08:08

## 2024-02-21 RX ADMIN — BUPROPION HYDROCHLORIDE 100 MG: 100 TABLET, FILM COATED ORAL at 20:17

## 2024-02-21 RX ADMIN — BUPROPION HYDROCHLORIDE 100 MG: 100 TABLET, FILM COATED ORAL at 08:08

## 2024-02-21 RX ADMIN — EZETIMIBE 5 MG: 10 TABLET ORAL at 08:08

## 2024-02-21 RX ADMIN — OXYCODONE HYDROCHLORIDE 2.5 MG: 5 TABLET ORAL at 08:08

## 2024-02-21 RX ADMIN — ACETAMINOPHEN 1000 MG: 500 TABLET, FILM COATED ORAL at 20:16

## 2024-02-21 RX ADMIN — FUROSEMIDE 20 MG: 20 TABLET ORAL at 08:08

## 2024-02-21 RX ADMIN — ATORVASTATIN CALCIUM 10 MG: 10 TABLET, FILM COATED ORAL at 20:17

## 2024-02-21 RX ADMIN — CITALOPRAM HYDROBROMIDE 20 MG: 20 TABLET ORAL at 08:08

## 2024-02-21 RX ADMIN — FUROSEMIDE 40 MG: 10 INJECTION, SOLUTION INTRAMUSCULAR; INTRAVENOUS at 12:10

## 2024-02-21 RX ADMIN — METOPROLOL SUCCINATE 25 MG: 25 TABLET, EXTENDED RELEASE ORAL at 08:08

## 2024-02-21 RX ADMIN — LISINOPRIL 5 MG: 5 TABLET ORAL at 08:08

## 2024-02-21 ASSESSMENT — ACTIVITIES OF DAILY LIVING (ADL)
ADLS_ACUITY_SCORE: 32

## 2024-02-21 NOTE — PLAN OF CARE
Goal Outcome Evaluation:       Summary: Resp Failure- resolved; Acute systolic CHF   DATE & TIME: 2/19-02/20  3047-9007  Cognitive Concerns/ Orientation : A&Ox4. Deaf in L ear, very [place] in R Ear with impaired hearing aid. Calm,cooperative, and pleasant.   BEHAVIOR & AGGRESSION TOOL COLOR: Green   ABNL VS/O2: VSS on 1L NC. VALDIVIA   MOBILITY: SBA, moves well.   PAIN MANAGMENT: Denies  DIET: Reg  BOWEL/BLADDER: Cont B/B. Using BR  ABNL LAB/BG: See chart  DRAIN/DEVICES: R PIV SL,  Strict I/Os  TELEMETRY RHYTHM:  SR BBB  SKIN: WDL, pale in color. TESTS/PROCEDURES:   D/C DAY/GOALS/PLACE: Pending work up and plan.   OTHER IMPORTANT INFO: Angio site on R inner thigh-Old dried blood otherwise intact. Tolerated ambulation to BR and back to bed after bedrest.

## 2024-02-21 NOTE — PROGRESS NOTES
[facility] Cardiology Progress Note  Date of Service: 02/21/2024  Primary Cardiologist: Rosemarie christian    Teresa Krishna is a 89 year old female with a hx of hypertension, hyperlipidemia, and coronary atherosclerosis based on prior CT calcium score, who was admitted on 2/16/2024 with acute hypoxic resp failure, progressive VALDIVIA x 1 year. Found to have reduced EF on TTE. Pt opted for stress testing > invasive eval for underlying CAD.     Interim Hx: Stress test showed small area of mid-distal anteroseptal infarct with mid teja-infarct ischemia. Angio showed non-obstructive CAD. Denies chest pain. Still requiring 2L O2.     Assessment:  Acute heart failure with reduced ejection fraction (LVEF 32%)  GDMT includes lisinopril, Toprol. Also on oral furosemide. Given a dose of IV Lasix as well.   Etiology possibly LBBB +/- HTN.  Left bundle branch block, presumably chronic  Trivial troponin elevation without significant delta, likely demand related  Mild-moderate non-obstructive CAD.   Hypertension, better controlled with med adjustment.  Moderate to severe aortic stenosis    Plan:   Repeat IV Lasix 40 mg today. Check proBNP.  Continue aspirin, statin, ACEi, BB, furosemide. Deferring salome and SGLT2i therapy d/t elderly frailty.  CORE clinic follow up will be arranged.     Plan was formulated under direction of Dr. Callahan.   Pearl Bowen PA-C  [facility] - Heart Clinic  Pager: [phone]  Text Page  (7:30am - 4pm M-F)   __________________________________________________________________________  Physical Exam   Temp: 97.2  F (36.2  C) Temp src: Oral BP: (!) 148/79 Pulse: 75   Resp: 18 SpO2: 92 % O2 Device: Nasal cannula Oxygen Delivery: 2 LPM  Vitals:    02/18/24 1145 02/19/24 0711 02/20/24 0643   Weight: 54.4 kg (120 lb) 54.2 kg (119 lb 7.8 oz) 54.9 kg (121 lb)       GENERAL:  The patient is in no apparent distress.   NECK: CVP appears normal, no masses or thyromegaly.  PULMONARY:  There  is a normal respiratory effort. Diffusely diminished lung sounds.   CARDIOVASCULAR:  RRR, normal S1 S2, no m/r/g.  GI:  Non tender abdomen with normoactive bowel sounds and no hepatosplenomegaly. There are no masses palpable.   EXTREMITIES:  No clubbing, cyanosis or edema.  VASCULAR: 2+ Pulses bilaterally in upper and lower extremities.    Medications    - MEDICATION INSTRUCTIONS -      ACE/ARB/ARNI NOT PRESCRIBED      BETA BLOCKER NOT PRESCRIBED        aspirin  81 mg Oral Daily    atorvastatin  10 mg Oral QPM    buPROPion  100 mg Oral BID    citalopram  20 mg Oral QAM    ezetimibe  5 mg Oral QAM    furosemide  20 mg Oral Daily    lisinopril  5 mg Oral Daily    metoprolol succinate ER  25 mg Oral Daily    sodium chloride (PF)  3 mL Intracatheter Q8H       Data   Most Recent 3 CBC's:  Recent Labs   Lab Test 02/21/24  0805 02/18/24  0850 02/17/24  1038   WBC 7.4 6.2 6.6   HGB 11.9 11.3* 11.3*   MCV 89 90 90    228 245     Most Recent 3 BMP's:  Recent Labs   Lab Test 02/21/24  0805 02/20/24  1041 02/19/24  0857    140 141   POTASSIUM 3.9 4.2 4.5   CHLORIDE 101 104 103   CO2 24 27 28   BUN 34.2* 27.6* 31.0*   CR 1.30* 1.13* 1.20*   ANIONGAP 16* 9 10   DANIELA 8.6 8.6 8.7   GLC 65* 96 95       A total of 30 minutes was spent face-to-face or coordinating care of Teresa Krishna. Over 50% of our time was spent counseling the patient and/or coordinating care.

## 2024-02-21 NOTE — CONSULTS
[facility]- C.O.R.E. Clinic    Received CORE Clinic Consult from Dr. Guy.    Reviewed Teresa's chart and note they are admitted for increase SOB over last several months, found down with hypoxia; Acute CHF . Echocardiogram on 2/16/24 shows LVEF 32%.  This is their 1st admission(s) in the past year for concerns of heart failure.    Given above information, Teresa meets criteria for CORE Clinic as patients EF is =/<40%.     Met with Teresa and daughter Genesis at bedside to discuss CORE Clinic consult request.  Patient/family confirm they plan to enroll in CORE Clinic.    Patient's primary insurance:  Medicare and BCBS    Pt reports the following HF symptoms prior to admission:  SOB for several months    Living situation:  lives alone in her own home; Her kids rotate throughout the week and bring meals    Med set up:  self    Scale available at home:  yes    Barriers to HF follow-up:  [facility]    Remote Monitoring:  consider HRS enrollment at upcoming CORE office visit- pt may benefit from HRS    CM/HF education topics we reviewed:  Low sodium  Daily weights  Symptoms of HF to be reported to CORE Team.      Education materials provided:    Low sodium food and drink handout  Low sodium food product examples  Already prepared low salt meal delivery services  HF stoplight tool  Guide to HF booklet      I have arranged a lab appointment and CORE visit with SHIKHA Hussein on 3/4, see below.     Instructed Magaly to call [facility] CORE Team at [phone] with questions/concerns prior to this visit. Specifically instructed them to call RN with weight gain greater than 2 lbs overnight, and/or 5 lbs in a week, and/or worsening SOB/edema/abdominal bloating.     Future Appointments   Date Time Provider Department Center   2/22/2024  9:15 AM Kimberly North, JUAN SHOT [facility]   2/22/2024 10:30 AM Kia Park, PT SHPT [facility]   3/4/2024 12:30 PM KEANE LAB [facility]   3/4/2024  1:50 PM  Pearl Bowen PA-C [facility] JACK WASSERMAN       Teresa voices understanding and denies further questions or concerns at this time.      Please call with any further questions.    Sneha Crowder RN, [facility]- [place], [place]  C.O.R.E. Clinic Care Coordinator  [phone]

## 2024-02-21 NOTE — PROGRESS NOTES
[facility]    Medicine Progress Note - Hospitalist Service    Date of Admission:  2/16/2024    Assessment & Plan      This is a 89-year-old female with history of hypertension, hyperlipidemia, depression, here lungs presented with acute worsening shortness of breath and found to be in acute systolic congestive heart failure and respiratory distress started on BiPAP and is an diuretic and subsequently admitted.        Assessment & Plan     Acute Systolic Congestive heart failure, new diagnosis: Improved  Moderate to severe aortic stenosis   Acute Hypoxic Hypercapnic Respiratory failure: Resolved  Lactic acidosis: Resolved     Pt presented to the ED with severe respiratory distress. Per report she has been having increasing shortness of breath over the past several months.  On the day of admission her respiratory status worsened significantly. She called EMS. Per report she was laying on the floor, minimally responsive. Initial sats in the 70s. Respiratory status improved markedly on BiPAP.   * Initial labs notable for VBG pH of 7.08, pCO2 of 79, and Lactate of 6.4.   * BNP also elevated to 15,783.   * CXR with mild to moderate cardiomegaly with small bilateral pleural effusions and pulmonary edema. No confluent airspace consolidation or pneumothorax.   * COVID, flu, RSV negative.      * TTE was obtained and showed LVEF of 32%. Diastolic doppler findings suggest increased LV filling pressures. There is also moderate to severe aortic stenosis.      No JVD, no basal crackles, no lower extremity edema, breathing comfortably at room air at this time on 2/17/2024.  Strict intake and output charting Daily weights,  Lactic acid is now normal.  Was elevated likely because of CHF and pulm edema  Consult to cardiology ,moderate to severe aortic stenosis, not enough to do any intervention at this time    Nuclear medicine stress test abnormal, small area of nontransmural infarction in the mid to distal  anteroseptal segment with mild degree of teja-infarct ischemia.  Will await for cardiology evaluation if she needs any coronary angiogram.     She was admitted, cardiology was consulted.  She was started on IV Lasix 60 mg twice daily, she responded very well to the treatment.  She is now on room air, breathing comfortably no sign of fluid overload at this time.  -Patient underwent coronary angiogram per cardiology recommendation, no significant intervention was done, recommend lifestyle modification and medical management.  -Given significant improvement in oxygen saturation currently on room air, with no significant concern for ongoing fluid overload with mild bump in creatinine, cardiology recommendations diuretic holiday for 48 hours  -Lasix 20 mg daily is put on hold along with lisinopril in the setting of mild JANET.     HTN  -Discontinue amlodipine.  Started on low-dose metoprolol 12.5 mg twice daily and lisinopril 2.5 mg daily.    Increase metoprolol to 25 mg twice daily agree with the    Mild JANET:  Creatinine on presentation was 1.08, progressively up trended with ongoing diuresis up to 1.13 on 2/20  Today creatinine is worse to 1.30  -Hold lisinopril and Lasix  -Recheck BMP in a.m. to monitor for creatinine downtrend prior to discharge     Depression/Anxiety   - Continued PTA bupropion and citalopram      Recent fall with left wrist pain  -Patient had a fall almost a month ago at home reportedly mechanical in nature following which she did not have any concerns for ongoing pain and was never evaluated in clinic or as outpatient but has been wearing wrist stabilizer  -Today patient is complaining of significant pain in the left wrist hence left wrist x-ray has been ordered  -Will consider consultation with orthopedic surgery if pain is persistent     Continue to monitor patient with recheck of BMP in a.m.  If creatinine is downtrending patient could likely discharge tomorrow depending on the x-ray of the left  wrist.     Diet: Fluid restriction 2000 ML FLUID (and additional linked orders)  2 Gram Sodium Diet    DVT Prophylaxis: Pneumatic Compression Devices  Marroquin Catheter: Not present  Lines: None     Cardiac Monitoring: ACTIVE order. Indication: Post- PCI/Angiogram (24 hours)  Code Status: No CPR- Do NOT Intubate      Clinically Significant Risk Factors                             # Financial/Environmental Concerns: none         Disposition Plan      Expected Discharge Date: 02/22/2024      Destination: home with family;home with help/services              Dada Peñaloza  Hospitalist Service  [facility]  Securely message with [employer] (more info)  Text page via [employer] Paging/Directory   ______________________________________________________________________    Interval History   Pt is resting in bed,with daughter at bedside,continues to have left wrist pain with mild swelling and limited ROM with recent fall and no evaluated post fall.Xray has been ordered.denies any SOB while sitting,no N/V/abdominal pain/dizziness/lightheadedness.Very hard of hearing.    Physical Exam   Vital Signs: Temp: 97.2  F (36.2  C) Temp src: Oral BP: 111/65 Pulse: 73   Resp: 18 SpO2: 93 % O2 Device: None (Room air) Oxygen Delivery: 2 LPM  Weight: 121 lbs 0 oz    Constitutional: Elderly female,Awake, alert, cooperative, no apparent distress  Respiratory: Clear to auscultation bilaterally, no crackles or wheezing  Cardiovascular: Regular rate and rhythm, normal S1 and S2, and no murmur noted  GI: Normal bowel sounds, soft, non-distended, non-tender  Skin/Integumen: left wrist swelling,tenderness to palpation ,mild swelling mild warmth to touch  Other: AxO x3,no apparent focal deficits    Medical Decision Making

## 2024-02-21 NOTE — DISCHARGE INSTRUCTIONS
Please call [facility] (Heart Clinic)with any concerns:  Monday-Friday 8:00 AM to 4:30 PM   CORE Nurse Line: [phone]  After hours:  [phone]    -- Please start weighing self daily and write in wt log chart to bring into your cardiology/CORE clinic follow up appts.   -- Please bring in current medication bottles to cardiology/CORE appts for review.   -- Call RN with weight gain greater than 2 lbs overnight, and/or 5 lbs in a week, and/or worsening shortness of breath/edema/abdominal bloating.       Your labs are NOT FASTING on 3/4 @ 12:30 PM.  OK to eat.

## 2024-02-21 NOTE — PROVIDER NOTIFICATION
MD Notification    Notified Person: MD    Notified Person Name: ELICEO Bowen Cardiac PA    Notification Date/Time: 1830, 2/20/23    Notification Interaction: Page    Purpose of Notification: Pt is bedrest for angio, angio site has slight bleeding, the bleeding has stopped but monitoring site currently. Would you like to add time to bedrest? CMS and pulses are good, vitals are WNL. /76. HR 71     Orders Received: add 2 hours to bedrest time    Comments:

## 2024-02-22 ENCOUNTER — APPOINTMENT (OUTPATIENT)
Dept: PHYSICAL THERAPY | Facility: CLINIC | Age: 89
DRG: 280 | End: 2024-02-22
Payer: MEDICARE

## 2024-02-22 ENCOUNTER — APPOINTMENT (OUTPATIENT)
Dept: OCCUPATIONAL THERAPY | Facility: CLINIC | Age: 89
DRG: 280 | End: 2024-02-22
Payer: MEDICARE

## 2024-02-22 PROBLEM — N17.0 ACUTE KIDNEY FAILURE WITH TUBULAR NECROSIS (H): Status: ACTIVE | Noted: 2024-02-22

## 2024-02-22 LAB
ANION GAP SERPL CALCULATED.3IONS-SCNC: 10 MMOL/L (ref 7–15)
BUN SERPL-MCNC: 40.2 MG/DL (ref 8–23)
CALCIUM SERPL-MCNC: 8.4 MG/DL (ref 8.2–9.6)
CHLORIDE SERPL-SCNC: 98 MMOL/L (ref 98–107)
CREAT SERPL-MCNC: 1.64 MG/DL (ref 0.51–0.95)
DEPRECATED HCO3 PLAS-SCNC: 30 MMOL/L (ref 22–29)
EGFRCR SERPLBLD CKD-EPI 2021: 29 ML/MIN/1.73M2
GLUCOSE SERPL-MCNC: 112 MG/DL (ref 70–99)
POTASSIUM SERPL-SCNC: 3.6 MMOL/L (ref 3.4–5.3)
SODIUM SERPL-SCNC: 138 MMOL/L (ref 135–145)

## 2024-02-22 PROCEDURE — 80048 BASIC METABOLIC PNL TOTAL CA: CPT | Performed by: INTERNAL MEDICINE

## 2024-02-22 PROCEDURE — 120N000001 HC R&B MED SURG/OB

## 2024-02-22 PROCEDURE — L3908 WHO COCK-UP NONMOLDE PRE OTS: HCPCS

## 2024-02-22 PROCEDURE — 97530 THERAPEUTIC ACTIVITIES: CPT | Mod: GP

## 2024-02-22 PROCEDURE — 99232 SBSQ HOSP IP/OBS MODERATE 35: CPT | Performed by: HOSPITALIST

## 2024-02-22 PROCEDURE — 250N000013 HC RX MED GY IP 250 OP 250 PS 637: Performed by: STUDENT IN AN ORGANIZED HEALTH CARE EDUCATION/TRAINING PROGRAM

## 2024-02-22 PROCEDURE — 250N000013 HC RX MED GY IP 250 OP 250 PS 637: Performed by: PHYSICIAN ASSISTANT

## 2024-02-22 PROCEDURE — 97530 THERAPEUTIC ACTIVITIES: CPT | Mod: GO

## 2024-02-22 PROCEDURE — 250N000013 HC RX MED GY IP 250 OP 250 PS 637: Performed by: HOSPITALIST

## 2024-02-22 PROCEDURE — 258N000003 HC RX IP 258 OP 636: Performed by: HOSPITALIST

## 2024-02-22 PROCEDURE — 36415 COLL VENOUS BLD VENIPUNCTURE: CPT | Performed by: INTERNAL MEDICINE

## 2024-02-22 PROCEDURE — 97116 GAIT TRAINING THERAPY: CPT | Mod: GP

## 2024-02-22 RX ORDER — SODIUM CHLORIDE 9 MG/ML
INJECTION, SOLUTION INTRAVENOUS CONTINUOUS
Status: ACTIVE | OUTPATIENT
Start: 2024-02-22 | End: 2024-02-22

## 2024-02-22 RX ADMIN — ACETAMINOPHEN 1000 MG: 500 TABLET, FILM COATED ORAL at 17:36

## 2024-02-22 RX ADMIN — SODIUM CHLORIDE: 9 INJECTION, SOLUTION INTRAVENOUS at 14:03

## 2024-02-22 RX ADMIN — METOPROLOL SUCCINATE 25 MG: 25 TABLET, EXTENDED RELEASE ORAL at 09:49

## 2024-02-22 RX ADMIN — ATORVASTATIN CALCIUM 10 MG: 10 TABLET, FILM COATED ORAL at 20:34

## 2024-02-22 RX ADMIN — BUPROPION HYDROCHLORIDE 100 MG: 100 TABLET, FILM COATED ORAL at 09:49

## 2024-02-22 RX ADMIN — ACETAMINOPHEN 1000 MG: 500 TABLET, FILM COATED ORAL at 04:23

## 2024-02-22 RX ADMIN — ASPIRIN 81 MG: 81 TABLET, COATED ORAL at 09:49

## 2024-02-22 RX ADMIN — CITALOPRAM HYDROBROMIDE 20 MG: 20 TABLET ORAL at 09:49

## 2024-02-22 RX ADMIN — OXYCODONE HYDROCHLORIDE 2.5 MG: 5 TABLET ORAL at 15:36

## 2024-02-22 RX ADMIN — OXYCODONE HYDROCHLORIDE 2.5 MG: 5 TABLET ORAL at 07:23

## 2024-02-22 RX ADMIN — EZETIMIBE 5 MG: 10 TABLET ORAL at 09:49

## 2024-02-22 RX ADMIN — BUPROPION HYDROCHLORIDE 100 MG: 100 TABLET, FILM COATED ORAL at 20:34

## 2024-02-22 ASSESSMENT — ACTIVITIES OF DAILY LIVING (ADL)
ADLS_ACUITY_SCORE: 32

## 2024-02-22 NOTE — PROGRESS NOTES
S:  We received an order for a L lace up/cock up wrist splint from Almaz Falk PA-C .  DX:L radial styloid FX  O:  I met with the patient in her room and donned a size medium L cock up wrist splint.  The brace fits adequate.  The brace is splinting the L wrist.  The patient says the brace is comfortable.  A:  The brace fits well and is splinting the L wrist.  P:  The patient will wear the brace following Physician guidelines.  G:  Splint L wrist.  Aleksandr WRIGHT

## 2024-02-22 NOTE — PLAN OF CARE
Goal Outcome Evaluation:      Plan of Care Reviewed With: patient    Overall Patient Progress: improvingOverall Patient Progress: improving     Summary: Resp Failure- resolved; Acute systolic CHF   DATE & TIME: 2/21/24- 2/22/24 5032-1118  Cognitive Concerns/ Orientation : A&Ox4. Forgetful of situation once during shift. Deaf in L ear, very [place] in R Ear with impaired hearing aid. Calm,cooperative, and pleasant.   BEHAVIOR & AGGRESSION TOOL COLOR: Green   ABNL VS/O2: VSS on RA saturating 93-94%, LS clear/dim.  MOBILITY: SBA with gait belt,   PAIN MANAGMENT: PRN Tylenol given x1 for L wrist pain.  DIET: Reg diet, fair appetite   BOWEL/BLADDER: Cont B/B. Using BR  ABNL LAB/BG: Creat 1.64  DRAIN/DEVICES: R PIV SL, Strict I/Os  TELEMETRY RHYTHM: SR with BBB  SKIN: WDL, pale, mild redness and swelling of L wrist, per daughter, pt had a fall at home on 1/26/24.  TESTS/PROCEDURES: None  D/C DAY/GOALS/PLACE: Pending Cardiology clearance.   OTHER IMPORTANT INFO: Angio site on Rt inner thigh-Old dried blood otherwise intact. VALDIVIA.  Need Orthopedic trauma surgery ordered.

## 2024-02-22 NOTE — PROVIDER NOTIFICATION
MD Notification    Notified Person: MD    Notified Person Name: Dr. Parr    Notification Date/Time: 2/22/24 8293    Notification Interaction: Bryan     Purpose of Notification: Just saw new order for fluids; pt was on lasix this weekend and drinking and eating ok. Also on fluid restriction; want to make sure you want me to start these fluids    Orders Received:    Comments:  Ordered for worsening renal failure, will recheck BMP tomorrow

## 2024-02-22 NOTE — CONSULTS
[facility]    Orthopedic Consultation    Teresa Krishna MRN# [id number]   Age: 89 year old YOB: 1930     Date of Admission: 2/16/2024    Reason for consult: Left wrist pain       Requesting physician: Luke Parr MD       Level of consult: One-time consult to assist in determining a diagnosis, recommend an appropriate treatment plan and place orders           Assessment and Plan:   Assessment:   Subacute, healing, nondisplaced, minimally impacted, intraarticular left radial styloid and questionable distal radius metaphyseal fracture  Left wrist osteoarthritis      Plan:   The patient's history and clinical/diagnostic findings were reviewed with the on-call orthopedic trauma surgeon, Dr. Jamari Reveles. Patient sustained a mechanical slip and fall on the ice in her driveway on 1/26/24 resulting in left wrist pain. She never sought care after the initial injury. Patient treated conservatively at home with a soft brace. Pain to the left wrist, with mild swelling, has increased over the past 2 days coinciding with the use of standard walker. Radiographs of the left wrist demonstrate diffuse degenerative changes as well as a radial styloid fracture that appears to have interval healing. NVI. Discussed findings with the patient and her daughter at bedside. Recommend nonoperative management of this left wrist fracture as follows given she sustained the injury nearly 1 month ago and reasonable alignment has been maintained. Patient also has left first CMC and MCP joint and STT joint osteoarthritis, where she is somewhat tender over today, which may respond well to a cortisone injection in the outpatient setting. Patient and daughter understand and agree with plan.     -Orthosis consult for left wrist brace. Brace to be worn with activity such as when ambulating, in public, or with sleeping (if too painful without a brace) for the next 2-3 weeks, but can otherwise be removed for  skin checks, hygiene, and to apply ice. Patient may benefit from a custom thumb spica splint or comfort cool as an outpatient for degenerative changes.  -No lifting more than a full coffee cup with the left hand. Okay for use of a platform walker.  -Encourage ROM of the digits, elbow, and shoulder.  -Encourage elevation and use of cold compresses for swelling.  -Pain control per medicine team.  -PT, OT, and SW consults recommended.  -Follow up with Dr. Thelma Guido at [facility] in 2-3 weeks for repeat x-rays and reevaluation of the left wrist. Please call [phone] to schedule. Ortho trauma to sign off this hospitalization. All questions answered.    Please contact orthopedic trauma team if any questions or concerns arise.           Chief Complaint:   Left wrist pain and swelling         History of Present Illness:   Medical history obtained via chart review and discussion with the patient and her daughter at bedside. Teresa Krishna is a 89 year old right-hand dominant female with past medical history of HTN and HLD admitted on 2/16/24 with acute hypoxic hypercapnic respiratory failure. Regarding her left wrist, the patient sustained a mechanical slip and fall on the ice in her driveway on 1/26/24. She believes she reached her left wrist out to catch her fall. Denies head trauma or LOC at that time. As the patient had full range of motion of the left wrist with minimal pain, she declined being seen by a provider and had been wearing a soft wrist wrap for comfort. The patient's left wrist had been doing well until 2 days ago (2/20/24), when she started to use a walker with PT and noticed increased left wrist pain and swelling. This is mostly located over the dorsoradial aspect of the wrist and with pain to the base of her thumb. Denies numbness and tingling to the LUE. No prior injuries or surgeries to the left wrist. Patient does not typically use an assistive device while ambulating. Tolerating PO  intake. No current infectious symptoms.          Past Medical History:   No past medical history on file.          Past Surgical History:   No past surgical history on file.          Social History:     Social History     Tobacco Use    Smoking status: Not on file    Smokeless tobacco: Not on file   Substance Use Topics    Alcohol use: Not on file             Family History:   No family history on file.          Immunizations:     VACCINE/DOSE   Diptheria   DPT   DTAP   HBIG   Hepatitis A   Hepatitis B   HIB   Influenza   Measles   Meningococcal   MMR   Mumps   Pneumococcal   Polio   Rubella   Small Pox   TDAP   Varicella   Zoster             Allergies:   No Known Allergies          Medications:     Current Facility-Administered Medications   Medication    - MEDICATION INSTRUCTIONS -    acetaminophen (TYLENOL) tablet 500-1,000 mg    aspirin EC tablet 81 mg    atorvastatin (LIPITOR) tablet 10 mg    buPROPion (WELLBUTRIN) tablet 100 mg    calcium carbonate (TUMS) chewable tablet 1,000 mg    citalopram (celeXA) tablet 20 mg    ezetimibe (ZETIA) half-tab 5 mg    [Held by provider] furosemide (LASIX) tablet 20 mg    gabapentin (NEURONTIN) capsule 100 mg    lidocaine (LMX4) cream    lidocaine 1 % 0.1-1 mL    [Held by provider] lisinopril (ZESTRIL) tablet 5 mg    metoprolol succinate ER (TOPROL XL) 24 hr tablet 25 mg    ondansetron (ZOFRAN ODT) ODT tab 4 mg    Or    ondansetron (ZOFRAN) injection 4 mg    oxyCODONE IR (ROXICODONE) half-tab 2.5 mg    Or    oxyCODONE (ROXICODONE) tablet 5 mg    Reason ACE/ARB/ARNI order not selected    Reason beta blocker not prescribed    senna-docusate (SENOKOT-S/PERICOLACE) 8.6-50 MG per tablet 1 tablet    Or    senna-docusate (SENOKOT-S/PERICOLACE) 8.6-50 MG per tablet 2 tablet    sodium chloride (PF) 0.9% PF flush 3 mL    sodium chloride (PF) 0.9% PF flush 3 mL    sodium chloride 0.9 % infusion             Review of Systems:   CV: NEGATIVE for chest pain, palpitations or peripheral  edema  C: NEGATIVE for fever, chills, change in weight  E/M: NEGATIVE for ear, mouth and throat problems  R: NEGATIVE for significant cough or SOB          Physical Exam:   All vitals have been reviewed  Patient Vitals for the past 24 hrs:   BP Temp Temp src Pulse Resp SpO2 Weight   02/22/24 0949 107/70 -- -- 83 -- -- --   02/22/24 0805 109/65 97.9  F (36.6  C) Oral 67 16 94 % --   02/22/24 0723 -- -- -- -- 16 -- --   02/22/24 0609 -- -- -- -- -- -- 51.7 kg (113 lb 15.7 oz)   02/22/24 0236 -- -- -- -- -- 93 % --   02/22/24 0115 106/61 97.5  F (36.4  C) Oral 70 16 93 % --   02/22/24 0052 -- -- -- 81 -- 92 % --   02/21/24 2100 -- -- -- -- 18 -- --   02/21/24 2010 102/59 98.5  F (36.9  C) Oral 81 18 93 % --   02/21/24 1915 -- -- -- -- -- 93 % --   02/21/24 1530 109/61 97.6  F (36.4  C) Oral 80 18 93 % --       Intake/Output Summary (Last 24 hours) at 2/22/2024 1413  Last data filed at 2/22/2024 0950  Gross per 24 hour   Intake 905 ml   Output 200 ml   Net 705 ml       Constitutional: Pleasant, alert, appropriate, following commands. NAD. Elem.  HEENT: Head atraumatic normocephalic. Pupils equal round and reactive.  Respiratory: Unlabored breathing no audible wheeze  Cardiovascular: Regular rate and rhythm per pulses.  GI: Abdomen is non-distended.  Lymph/Hematologic: No lymphadenopathy in areas examined.  Genitourinary: No renee  Skin: No rashes, no cyanosis, no edema.  Musculoskeletal: Left upper extremity: Skin intact to the examined areas. Ecchymosis to the left antecubital region from recent PIV insertion. No erythema or ecchymosis to the left hand or wrist. Diffuse swelling to the dorsoradial aspect of the left wrist. Deformity (shouldering) to the left base of the thumb. Forearm is soft and compressible. Tender to the radial styloid > distal radius, first CMC joint, and STT joint region. Nontender over the anatomic snuff box, first MCP joint, ulnar styloid, and remainder of the hand. Patient is able to make a  complete fist. Full active abduction, adduction, flexion, and extension of digits. Able to actively flex and extend the left wrist within normal range. Radial pulse 2+. Capillary refill <2 seconds. SILT M/R/U nerve distributions.  Neurologic: GCS 15, A&OX3, normal mood and affect          Data:   All laboratory data reviewed  Results for orders placed or performed during the hospital encounter of 02/16/24   XR Chest Port 1 View     Status: None    Narrative    EXAM: XR CHEST PORT 1 VIEW  LOCATION: [facility]  DATE: 2/16/2024    INDICATION: shortness of breath  COMPARISON: None.      Impression    IMPRESSION: Mild to moderate cardiomegaly with small bilateral pleural effusions and pulmonary edema. No confluent airspace consolidation or pneumothorax.   XR Wrist Left G/E 3 Views     Status: None    Narrative    EXAM: XR WRIST LEFT G/E 3 VIEWS  DATE/TIME: 2/21/2024 4:17 PM    INDICATION: left wrist pain  COMPARISON: None available.       Impression    IMPRESSION: Diffuse osseous demineralization. Advanced degenerative  arthrosis of the first CMC, STT, and first MCP joints.  Chondrocalcinosis of the TFCC.    Possible acute nondisplaced fracture of the distal radial metaphysis  with subtle cortical lucency and step-off involving the base of the  radial styloid. If clinically indicated MRI may be helpful for further  characterization.    NOTE: ABNORMAL REPORT    THE DICTATION ABOVE DESCRIBES AN ABNORMAL REPORT FOR WHICH FOLLOW-UP  IS NEEDED.    VIKTOR SERVIN DO         SYSTEM ID:  [id number]   Comprehensive metabolic panel     Status: Abnormal   Result Value Ref Range    Sodium 141 135 - 145 mmol/L    Potassium 3.8 3.4 - 5.3 mmol/L    Carbon Dioxide (CO2) 22 22 - 29 mmol/L    Anion Gap 15 7 - 15 mmol/L    Urea Nitrogen 21.6 8.0 - 23.0 mg/dL    Creatinine 1.08 (H) 0.51 - 0.95 mg/dL    GFR Estimate 48 (L) >60 mL/min/1.73m2    Calcium 8.4 8.2 - 9.6 mg/dL    Chloride 104 98 - 107 mmol/L    Glucose  230 (H) 70 - 99 mg/dL    Alkaline Phosphatase 110 40 - 150 U/L    AST 37 0 - 45 U/L    ALT 20 0 - 50 U/L    Protein Total 6.3 (L) 6.4 - 8.3 g/dL    Albumin 3.8 3.5 - 5.2 g/dL    Bilirubin Total 0.3 <=1.2 mg/dL   Troponin T, High Sensitivity     Status: Abnormal   Result Value Ref Range    Troponin T, High Sensitivity 35 (H) <=14 ng/L   Nt probnp inpatient (BNP)     Status: Abnormal   Result Value Ref Range    N terminal Pro BNP Inpatient 15,783 (H) 0 - 1,800 pg/mL   Symptomatic Influenza A/B, RSV, & SARS-CoV2 PCR (COVID-19) Nasopharyngeal     Status: Normal    Specimen: Nasopharyngeal; Swab   Result Value Ref Range    Influenza A PCR Negative Negative    Influenza B PCR Negative Negative    RSV PCR Negative Negative    SARS CoV2 PCR Negative Negative    Narrative    Testing was performed using the Xpert Xpress CoV2/Flu/RSV Assay on the [employer] GeneXpert Instrument. This test should be ordered for the detection of SARS-CoV-2, influenza, and RSV viruses in individuals who meet clinical and/or epidemiological criteria. Test performance is unknown in asymptomatic patients. This test is for in vitro diagnostic use under the FDA EUA for laboratories certified under CLIA to perform high or moderate complexity testing. This test has not been FDA cleared or approved. A negative result does not rule out the presence of PCR inhibitors in the specimen or target RNA in concentration below the limit of detection for the assay. If only one viral target is positive but coinfection with multiple targets is suspected, the sample should be re-tested with another FDA cleared, approved, or authorized test, if coinfection would change clinical management. This test was validated by the [facility]. These laboratories are certified under the Clinical Laboratory Improvement Amendments of 1988 (CLIA-88) as qualified to perform high complexity laboratory testing.   iStat Gases (lactate) venous, POCT     Status: Abnormal    Result Value Ref Range    Lactic Acid POCT 6.4 (HH) <=2.0 mmol/L    Bicarbonate Venous POCT 23 21 - 28 mmol/L    O2 Sat, Venous POCT 73 70 - 75 %    pCO2 Venous POCT 79 (HH) 40 - 50 mm Hg    pH Venous POCT 7.08 (LL) 7.32 - 7.43    pO2 Venous POCT 55 (H) 25 - 47 mm Hg   CBC with platelets and differential     Status: Abnormal   Result Value Ref Range    WBC Count 10.3 4.0 - 11.0 10e3/uL    RBC Count 3.97 3.80 - 5.20 10e6/uL    Hemoglobin 11.7 11.7 - 15.7 g/dL    Hematocrit 37.2 35.0 - 47.0 %    MCV 94 78 - 100 fL    MCH 29.5 26.5 - 33.0 pg    MCHC 31.5 31.5 - 36.5 g/dL    RDW 15.2 (H) 10.0 - 15.0 %    Platelet Count 293 150 - 450 10e3/uL    % Neutrophils      % Lymphocytes      % Monocytes      % Eosinophils      % Basophils      % Immature Granulocytes      NRBCs per 100 WBC 0 <1 /100    Absolute Neutrophils      Absolute Lymphocytes      Absolute Monocytes      Absolute Eosinophils      Absolute Basophils      Absolute Immature Granulocytes      Absolute NRBCs 0.0 10e3/uL   iStat Gases Electrolytes Venous, POCT     Status: Abnormal   Result Value Ref Range    CPB Applied No     Hematocrit POCT 38 35 - 47 %    Bicarbonate Venous POCT 23 21 - 28 mmol/L    Hemoglobin POCT 12.9 11.7 - 15.7 g/dL    Potassium POCT 4.2 3.4 - 5.3 mmol/L    Sodium POCT 141 135 - 145 mmol/L    pCO2 Venous POCT 79 (HH) 40 - 50 mm Hg    pH Venous POCT 7.08 (LL) 7.32 - 7.43    pO2 Venous POCT 54 (H) 25 - 47 mm Hg    O2 Sat, Venous POCT 72 70 - 75 %   UA with Microscopic reflex to Culture     Status: Abnormal    Specimen: Urine, Catheter   Result Value Ref Range    Color Urine Light Yellow Colorless, Straw, Light Yellow, Yellow    Appearance Urine Clear Clear    Glucose Urine Negative Negative mg/dL    Bilirubin Urine Negative Negative    Ketones Urine Negative Negative mg/dL    Specific Gravity Urine 1.026 1.003 - 1.035    Blood Urine Negative Negative    pH Urine 5.5 5.0 - 7.0    Protein Albumin Urine 30 (A) Negative mg/dL    Urobilinogen  Urine Normal Normal, 2.0 mg/dL    Nitrite Urine Negative Negative    Leukocyte Esterase Urine Negative Negative    Bacteria Urine Few (A) None Seen /HPF    Mucus Urine Present (A) None Seen /LPF    RBC Urine 11 (H) <=2 /HPF    WBC Urine 2 <=5 /HPF    Hyaline Casts Urine 3 (H) <=2 /LPF    Narrative    Urine Culture not indicated   iStat Gases (lactate) venous, POCT     Status: Abnormal   Result Value Ref Range    Lactic Acid POCT 3.0 (H) <=2.0 mmol/L    Bicarbonate Venous POCT 24 21 - 28 mmol/L    O2 Sat, Venous POCT 58 (L) 70 - 75 %    pCO2 Venous POCT 55 (H) 40 - 50 mm Hg    pH Venous POCT 7.25 (L) 7.32 - 7.43    pO2 Venous POCT 36 25 - 47 mm Hg   Lactic acid whole blood     Status: Normal   Result Value Ref Range    Lactic Acid 1.5 0.7 - 2.0 mmol/L   Manual Differential     Status: Abnormal   Result Value Ref Range    % Neutrophils 27 %    % Lymphocytes 64 %    % Monocytes 5 %    % Eosinophils 4 %    % Basophils 0 %    Absolute Neutrophils 2.8 1.6 - 8.3 10e3/uL    Absolute Lymphocytes 6.6 (H) 0.8 - 5.3 10e3/uL    Absolute Monocytes 0.5 0.0 - 1.3 10e3/uL    Absolute Eosinophils 0.4 0.0 - 0.7 10e3/uL    Absolute Basophils 0.0 0.0 - 0.2 10e3/uL    RBC Morphology Confirmed RBC Indices     Platelet Assessment  Automated Count Confirmed. Platelet morphology is normal.     Automated Count Confirmed. Platelet morphology is normal.   Procalcitonin     Status: Normal   Result Value Ref Range    Procalcitonin 0.08 <0.50 ng/mL   Hemoglobin A1c     Status: Normal   Result Value Ref Range    Hemoglobin A1C 5.2 <5.7 %   Troponin T, High Sensitivity     Status: Abnormal   Result Value Ref Range    Troponin T, High Sensitivity 74 (H) <=14 ng/L   Troponin T, High Sensitivity     Status: Abnormal   Result Value Ref Range    Troponin T, High Sensitivity 65 (H) <=14 ng/L   CBC with platelets     Status: Abnormal   Result Value Ref Range    WBC Count 6.6 4.0 - 11.0 10e3/uL    RBC Count 3.77 (L) 3.80 - 5.20 10e6/uL    Hemoglobin 11.3  (L) 11.7 - 15.7 g/dL    Hematocrit 33.8 (L) 35.0 - 47.0 %    MCV 90 78 - 100 fL    MCH 30.0 26.5 - 33.0 pg    MCHC 33.4 31.5 - 36.5 g/dL    RDW 15.3 (H) 10.0 - 15.0 %    Platelet Count 245 150 - 450 10e3/uL   Basic metabolic panel     Status: Abnormal   Result Value Ref Range    Sodium 141 135 - 145 mmol/L    Potassium 4.0 3.4 - 5.3 mmol/L    Chloride 104 98 - 107 mmol/L    Carbon Dioxide (CO2) 27 22 - 29 mmol/L    Anion Gap 10 7 - 15 mmol/L    Urea Nitrogen 30.1 (H) 8.0 - 23.0 mg/dL    Creatinine 1.24 (H) 0.51 - 0.95 mg/dL    GFR Estimate 40 (L) >60 mL/min/1.73m2    Calcium 8.8 8.2 - 9.6 mg/dL    Glucose 100 (H) 70 - 99 mg/dL   Phosphorus     Status: Normal   Result Value Ref Range    Phosphorus 4.5 2.5 - 4.5 mg/dL   Magnesium     Status: Abnormal   Result Value Ref Range    Magnesium 2.4 (H) 1.7 - 2.3 mg/dL   Renal panel     Status: Abnormal   Result Value Ref Range    Sodium 139 135 - 145 mmol/L    Potassium 4.4 3.4 - 5.3 mmol/L    Chloride 102 98 - 107 mmol/L    Carbon Dioxide (CO2) 27 22 - 29 mmol/L    Anion Gap 10 7 - 15 mmol/L    Glucose 92 70 - 99 mg/dL    Urea Nitrogen 35.8 (H) 8.0 - 23.0 mg/dL    Creatinine 1.28 (H) 0.51 - 0.95 mg/dL    GFR Estimate 39 (L) >60 mL/min/1.73m2    Calcium 8.6 8.2 - 9.6 mg/dL    Albumin 3.7 3.5 - 5.2 g/dL    Phosphorus 4.7 (H) 2.5 - 4.5 mg/dL   Nt probnp inpatient     Status: Abnormal   Result Value Ref Range    N terminal Pro BNP Inpatient 16,395 (H) 0 - 1,800 pg/mL   CBC with platelets and differential     Status: Abnormal   Result Value Ref Range    WBC Count 6.2 4.0 - 11.0 10e3/uL    RBC Count 3.82 3.80 - 5.20 10e6/uL    Hemoglobin 11.3 (L) 11.7 - 15.7 g/dL    Hematocrit 34.5 (L) 35.0 - 47.0 %    MCV 90 78 - 100 fL    MCH 29.6 26.5 - 33.0 pg    MCHC 32.8 31.5 - 36.5 g/dL    RDW 15.6 (H) 10.0 - 15.0 %    Platelet Count 228 150 - 450 10e3/uL    % Neutrophils 63 %    % Lymphocytes 26 %    % Monocytes 8 %    % Eosinophils 3 %    % Basophils 0 %    % Immature Granulocytes 0 %     NRBCs per 100 WBC 0 <1 /100    Absolute Neutrophils 3.9 1.6 - 8.3 10e3/uL    Absolute Lymphocytes 1.6 0.8 - 5.3 10e3/uL    Absolute Monocytes 0.5 0.0 - 1.3 10e3/uL    Absolute Eosinophils 0.2 0.0 - 0.7 10e3/uL    Absolute Basophils 0.0 0.0 - 0.2 10e3/uL    Absolute Immature Granulocytes 0.0 <=0.4 10e3/uL    Absolute NRBCs 0.0 10e3/uL   Magnesium     Status: Normal   Result Value Ref Range    Magnesium 2.3 1.7 - 2.3 mg/dL   Magnesium     Status: Normal   Result Value Ref Range    Magnesium 2.3 1.7 - 2.3 mg/dL   Phosphorus     Status: Normal   Result Value Ref Range    Phosphorus 4.2 2.5 - 4.5 mg/dL   Basic metabolic panel     Status: Abnormal   Result Value Ref Range    Sodium 141 135 - 145 mmol/L    Potassium 4.5 3.4 - 5.3 mmol/L    Chloride 103 98 - 107 mmol/L    Carbon Dioxide (CO2) 28 22 - 29 mmol/L    Anion Gap 10 7 - 15 mmol/L    Urea Nitrogen 31.0 (H) 8.0 - 23.0 mg/dL    Creatinine 1.20 (H) 0.51 - 0.95 mg/dL    GFR Estimate 42 (L) >60 mL/min/1.73m2    Calcium 8.7 8.2 - 9.6 mg/dL    Glucose 95 70 - 99 mg/dL   Phosphorus     Status: Normal   Result Value Ref Range    Phosphorus 4.2 2.5 - 4.5 mg/dL   Magnesium     Status: Normal   Result Value Ref Range    Magnesium 2.1 1.7 - 2.3 mg/dL   Basic metabolic panel     Status: Abnormal   Result Value Ref Range    Sodium 140 135 - 145 mmol/L    Potassium 4.2 3.4 - 5.3 mmol/L    Chloride 104 98 - 107 mmol/L    Carbon Dioxide (CO2) 27 22 - 29 mmol/L    Anion Gap 9 7 - 15 mmol/L    Urea Nitrogen 27.6 (H) 8.0 - 23.0 mg/dL    Creatinine 1.13 (H) 0.51 - 0.95 mg/dL    GFR Estimate 45 (L) >60 mL/min/1.73m2    Calcium 8.6 8.2 - 9.6 mg/dL    Glucose 96 70 - 99 mg/dL   Lipid panel reflex to direct LDL     Status: Abnormal   Result Value Ref Range    Cholesterol 188 <200 mg/dL    Triglycerides 120 <150 mg/dL    Direct Measure HDL 49 (L) >=50 mg/dL    LDL Cholesterol Calculated 115 (H) <=100 mg/dL    Non HDL Cholesterol 139 (H) <130 mg/dL    Narrative    Cholesterol  Desirable:   <200 mg/dL    Triglycerides  Normal:  Less than 150 mg/dL  Borderline High:  150-199 mg/dL  High:  200-499 mg/dL  Very High:  Greater than or equal to 500 mg/dL    Direct Measure HDL  Female:  Greater than or equal to 50 mg/dL   Male:  Greater than or equal to 40 mg/dL    LDL Cholesterol  Desirable:  <100mg/dL  Above Desirable:  100-129 mg/dL   Borderline High:  130-159 mg/dL   High:  160-189 mg/dL   Very High:  >= 190 mg/dL    Non HDL Cholesterol  Desirable:  130 mg/dL  Above Desirable:  130-159 mg/dL  Borderline High:  160-189 mg/dL  High:  190-219 mg/dL  Very High:  Greater than or equal to 220 mg/dL   Activated clotting time celite, POCT     Status: Abnormal   Result Value Ref Range    Activated Clotting Time (Celite) POCT 429 (H) 74 - 150 seconds   Magnesium     Status: Normal   Result Value Ref Range    Magnesium 2.3 1.7 - 2.3 mg/dL   Phosphorus     Status: Abnormal   Result Value Ref Range    Phosphorus 4.9 (H) 2.5 - 4.5 mg/dL   Basic metabolic panel     Status: Abnormal   Result Value Ref Range    Sodium 141 135 - 145 mmol/L    Potassium 3.9 3.4 - 5.3 mmol/L    Chloride 101 98 - 107 mmol/L    Carbon Dioxide (CO2) 24 22 - 29 mmol/L    Anion Gap 16 (H) 7 - 15 mmol/L    Urea Nitrogen 34.2 (H) 8.0 - 23.0 mg/dL    Creatinine 1.30 (H) 0.51 - 0.95 mg/dL    GFR Estimate 38 (L) >60 mL/min/1.73m2    Calcium 8.6 8.2 - 9.6 mg/dL    Glucose 65 (L) 70 - 99 mg/dL   CBC with platelets and differential     Status: None   Result Value Ref Range    WBC Count 7.4 4.0 - 11.0 10e3/uL    RBC Count 4.01 3.80 - 5.20 10e6/uL    Hemoglobin 11.9 11.7 - 15.7 g/dL    Hematocrit 35.7 35.0 - 47.0 %    MCV 89 78 - 100 fL    MCH 29.7 26.5 - 33.0 pg    MCHC 33.3 31.5 - 36.5 g/dL    RDW 14.6 10.0 - 15.0 %    Platelet Count 230 150 - 450 10e3/uL    % Neutrophils 80 %    % Lymphocytes 11 %    % Monocytes 9 %    % Eosinophils 0 %    % Basophils 0 %    % Immature Granulocytes 0 %    NRBCs per 100 WBC 0 <1 /100    Absolute Neutrophils 5.8 1.6 - 8.3  10e3/uL    Absolute Lymphocytes 0.8 0.8 - 5.3 10e3/uL    Absolute Monocytes 0.7 0.0 - 1.3 10e3/uL    Absolute Eosinophils 0.0 0.0 - 0.7 10e3/uL    Absolute Basophils 0.0 0.0 - 0.2 10e3/uL    Absolute Immature Granulocytes 0.0 <=0.4 10e3/uL    Absolute NRBCs 0.0 10e3/uL   Glucose by meter     Status: Abnormal   Result Value Ref Range    GLUCOSE BY METER POCT 171 (H) 70 - 99 mg/dL   N terminal pro BNP outpatient     Status: Abnormal   Result Value Ref Range    N Terminal Pro BNP Outpatient 21,961 (H) 0 - 1,800 pg/mL   Basic metabolic panel     Status: Abnormal   Result Value Ref Range    Sodium 138 135 - 145 mmol/L    Potassium 3.6 3.4 - 5.3 mmol/L    Chloride 98 98 - 107 mmol/L    Carbon Dioxide (CO2) 30 (H) 22 - 29 mmol/L    Anion Gap 10 7 - 15 mmol/L    Urea Nitrogen 40.2 (H) 8.0 - 23.0 mg/dL    Creatinine 1.64 (H) 0.51 - 0.95 mg/dL    GFR Estimate 29 (L) >60 mL/min/1.73m2    Calcium 8.4 8.2 - 9.6 mg/dL    Glucose 112 (H) 70 - 99 mg/dL   EKG 12-lead, tracing only     Status: None   Result Value Ref Range    Systolic Blood Pressure  mmHg    Diastolic Blood Pressure  mmHg    Ventricular Rate 123 BPM    Atrial Rate 123 BPM    MD Interval 152 ms    QRS Duration 158 ms     ms    QTc 541 ms    P Axis -27 degrees    R AXIS -50 degrees    T Axis 109 degrees    Interpretation ECG       Sinus tachycardia  Left axis deviation  Left bundle branch block  Abnormal ECG  No previous ECGs available  Confirmed by GENERATED REPORT, COMPUTER ([id number]),  Mickie Cid ([id number]) on 2024 6:04:05 AM     Echocardiogram Complete     Status: None   Result Value Ref Range    Biplane LVEF 32%     Narrative    [id number]  [id number]  2010^AYAH^BREEZY^A     [facility]  Echocardiography Laboratory  [address] [place] [id number]     Name: DESHAUN DEAL  MRN: [id number]  : 1930  Study Date: 2024 09:00 AM  Age: 89 yrs  Gender: Female  Patient Location: [facility]  Reason For Study:  CHF  Ordering Physician: BREEZY POON  Referring Physician: RADHA PMD  Performed By: Tao Nagel RDCS     BSA: 1.5 m2  Height: 60 in  Weight: 118 lb  HR: 92  BP: 97/69 mmHg  ______________________________________________________________________________  Procedure  Complete Portable Echo Adult. Optison (NDC #[id number]) given intravenously.  ______________________________________________________________________________  Interpretation Summary     The left ventricle is normal in size.  Diastolic Doppler findings (E/E' ratio and/or other parameters) suggest left  ventricular filling pressures are increased.  Biplane LVEF is 32%.  Moderate to severe valvular aortic stenosis.  ______________________________________________________________________________  Left Ventricle  The left ventricle is normal in size. A sigmoid septum is present. Diastolic  Doppler findings (E/E' ratio and/or other parameters) suggest left ventricular  filling pressures are increased. Biplane LVEF is 32%. Septal motion is  consistent with conduction abnormality.     Right Ventricle  The right ventricle is normal in size and function.     Atria  The left atrium is moderately dilated. Right atrial size is normal. There is  no color Doppler evidence of an atrial shunt.     Mitral Valve  The mitral valve leaflets are mildly thickened. There is mild (1+) mitral  regurgitation.     Tricuspid Valve  There is mild (1+) tricuspid regurgitation. The right ventricular systolic  pressure is approximated at 37.0 mmHg plus the right atrial pressure. IVC  diameter and respiratory changes fall into an intermediate range suggesting an  RA pressure of 8 mmHg.     Aortic Valve  There is mild (1+) aortic regurgitation. The mean AoV pressure gradient is  30.0 mmHg. Moderate to severe valvular aortic stenosis.     Pulmonic Valve  There is trace pulmonic valvular regurgitation.     Vessels  Normal size aorta. The aortic root is normal size.     Pericardium  There  is no pericardial effusion.     Rhythm  Sinus rhythm was noted.  ______________________________________________________________________________  MMode/2D Measurements & Calculations  IVSd: 1.1 cm     LVIDd: 4.3 cm  LVIDs: 3.7 cm  LVPWd: 1.2 cm  FS: 14.2 %  LV mass(C)d: 177.1 grams  LV mass(C)dI: 118.8 grams/m2  Ao root diam: 2.8 cm  LA dimension: 3.6 cm  LA/Ao: 1.3  LVOT diam: 1.9 cm  LVOT area: 2.9 cm2  Ao root diam index Ht(cm/m): 1.8  Ao root diam index BSA (cm/m2): 1.9  RV Base: 3.6 cm  RWT: 0.58  TAPSE: 2.7 cm     Doppler Measurements & Calculations  MV E max gabriel: 67.4 cm/sec  MV A max gabriel: 178.9 cm/sec  MV E/A: 0.38  Ao V2 max: 365.5 cm/sec  Ao max P.4 mmHg  Ao V2 mean: 262.2 cm/sec  Ao mean P.0 mmHg  Ao V2 VTI: 76.2 cm  IVÁN(I,D): 0.99 cm2  IVÁN(V,D): 1.1 cm2  AI P1/2t: 308.5 msec  LV V1 max P.1 mmHg  LV V1 max: 133.6 cm/sec  LV V1 VTI: 26.0 cm     SV(LVOT): 75.5 ml  SI(LVOT): 50.6 ml/m2  PA acc time: 0.10 sec  TR max gabriel: 304.3 cm/sec  TR max P.0 mmHg  AV Gabriel Ratio (DI): 0.37  IVÁN Index (cm2/m2): 0.66  E/E' av.5  Lateral E/e': 5.7  Medial E/e': 17.2  RV S Gabriel: 14.1 cm/sec     ______________________________________________________________________________  Report approved by: Ruben Whitman 2024 10:42 AM         Cardiac Catheterization     Status: None (Preliminary result)    Narrative      Prox RCA to Mid RCA lesion is 40% stenosed.    1st Diag-1 lesion is 45% stenosed.    Prox LAD lesion is 50% stenosed.    1st Diag-2 lesion is 40% stenosed.    Mid Cx lesion is 55% stenosed.    2nd Mrg lesion is 35% stenosed.    Left ventricular filling pressures are normal.    Right dominant coronary anatomy.  Obstructive coronary disease involving mid circumflex, which is not   hemodynamically significant-IFR 0.96.  Artery disease involving proximal LAD, which is also not hemodynamically   significant-IFR 0.91. Lesions are on tight bends and appear severe but   normal flow reserve. There is a  diag vessel which is small and likely   could be flow limiting but this would not account for a global   hypokinesis/reduced EF scenario.  Med Rx recommended  Left ventricular end-diastolic pressure of 8 mmHg.  Transaortic mean gradient of 27 mmHg.  (Likely moderate aortic stenosis,   compare with echo results suggested) Although not accurate due to extreme   [id number] respiratory variations.      Blood Culture Peripheral Blood     Status: Normal    Specimen: Peripheral Blood   Result Value Ref Range    Culture No Growth    Blood Culture Peripheral Blood     Status: Normal    Specimen: Peripheral Blood   Result Value Ref Range    Culture No Growth    NM Lexiscan stress test (nuc card)     Status: None   Result Value Ref Range    Target      Baseline Systolic      Baseline Diastolic BP 90     Last Stress Systolic      Last Stress Diastolic BP 80     Baseline HR 70 bpm    Max HR  78     Max Predicted HR  61 %    Rate Pressure Product 10,920.0     Left Ventricular EF 27 %    Narrative      The nuclear stress test is abnormal.    There is a small area of nontransmural infarction in the mid to distal   anteroseptal segment(s) of the left ventricle associated with a mild   degree of teja-infarct ischemia.    Left ventricular function is severely reduced. The left ventricular   ejection fraction at stress is 27%.    There is no prior study for comparison.     CBC with platelets differential     Status: Abnormal    Narrative    The following orders were created for panel order CBC with platelets differential.  Procedure                               Abnormality         Status                     ---------                               -----------         ------                     CBC with platelets and d...[[id number]]  Abnormal            Final result               Manual Differential[[id number]]          Abnormal            Final result                 Please view results for these tests on the  individual orders.   CBC with Platelets & Differential     Status: Abnormal    Narrative    The following orders were created for panel order CBC with Platelets & Differential.  Procedure                               Abnormality         Status                     ---------                               -----------         ------                     CBC with platelets and d...[[id number]]  Abnormal            Final result                 Please view results for these tests on the individual orders.   CBC with Platelets & Differential     Status: None    Narrative    The following orders were created for panel order CBC with Platelets & Differential.  Procedure                               Abnormality         Status                     ---------                               -----------         ------                     CBC with platelets and d...[[id number]]                      Final result                 Please view results for these tests on the individual orders.          Attestation:  I have reviewed today's vital signs, notes, medications, labs and imaging with Dr. Jamari Reveles.  Amount of time performed on this consult: 50 minutes.    Almaz Falk PA-C  [facility]

## 2024-02-22 NOTE — PLAN OF CARE
Goal Outcome Evaluation:      Plan of Care Reviewed With: patient    Summary: Resp Failure- resolved; Acute systolic CHF   DATE & TIME: 02/21/24 [id number]  Cognitive Concerns/ Orientation : A&Ox4. Deaf in L ear, very [place] in R Ear with impaired hearing aid. Calm,cooperative, and pleasant.   BEHAVIOR & AGGRESSION TOOL COLOR: Green   ABNL VS/O2: VSS, O2 weaned down to RA sating 93%, LS clear/dim. Encouraged frequent IS.   MOBILITY: SBA with gait belt,   PAIN MANAGMENT: c/p Lt wrist pain 7/10, given prn Oxycodone 2.5 mg x1 with some relief.   DIET: Reg diet, fair appetite   BOWEL/BLADDER: Cont B/B. Using BR  ABNL LAB/BG: Cr 1.30, BNP 21,961, BG 65, recheck after breakfast 171.   DRAIN/DEVICES: R PIV SL, Strict I/Os  TELEMETRY RHYTHM: SR with BBB  SKIN: WDL, pale, mild redness and swelling of Lt wrist, per daughter, pt had a fall at home on 1/26/24, pain is worse since yesterday, MD ordered XR Lt wrist.   TESTS/PROCEDURES: Lt wrist XR showed nondisplaced fracture.   D/C DAY/GOALS/PLACE: Pending Cardiology clearance.   OTHER IMPORTANT INFO: Angio site on Rt inner thigh-Old dried blood otherwise intact. Tolerated ambulation in room/bathroom and in hallways, VALDIVIA, O2 remained >90% with activity. Cardiology ordered one time dose of IV lasix 40 mg, adequate UOP. Orthopedic surgery consulted for abnormal Lt wrist XR.

## 2024-02-22 NOTE — PROVIDER NOTIFICATION
[facility] orthopedic called, stated orthopedic trauma needs to be consulted instead. MD notified.

## 2024-02-22 NOTE — PROGRESS NOTES
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, STT, and first MCP joints and possible acute nondisplaced fracture of the distal radial metaphysis with subtle cortical lucency and step-off involving the base of the radial styloid  - evaluated by orthopedics and ordered for left wrist splint; placed by orthotist (2/22)    - Renal function worsening with CR --->1.6, BUN 40; Lasix and lisinopril on hold  - cardiology signed off 2/21, suggested a diuretic holiday for 48 hrs (can probably resume on 2/24/24 if renal function improved to stable  - will order for NS 50 ml/hr X 10 hrs and repeat BMP for 2/23  - PT/OT rec home with assist with home cares         Assessment and Plan:        Teresa Krishna is a 89-year-old female with PMH of hypertension, hyperlipidemia, depression who presented with acute worsening shortness of breath and found to be in acute systolic congestive heart failure and respiratory distress started on BiPAP and IV diuretic , admitted inpatient 2/16/24.     Acute Systolic Congestive heart failure, new diagnosis: Improved  Moderate to severe aortic stenosis   Acute Hypoxic Hypercapnic Respiratory failure: Resolved  Mild to mod non-obstructive CAD ( cath 2/20/24)  Lactic acidosis: Resolved  Hypertension  - PTA on amlodipine 5 mg daily, not on any diuretics  - on presentation was requiring BiPAP for hypoxia (O2 sat in 70s) /respiratory distress  - on initial eval, BNP 15,783; troponin T 35---74--- 65  - CXR 2/16 with mild to moderate cardiomegaly with small bilateral pleural effusions and pulmonary edema. No confluent airspace consolidation or pneumothorax.   * COVID, flu, RSV negative.   * TTE  2/16/24 noted LVEF of 32%. Diastolic doppler findings suggest increased LV filling pressures. There is also moderate to severe aortic stenosis.  - Lexiscan 2/19/24 noted abnormal with a small area of nontransmural infarction in the mid to distal anteroseptal segment(s) of the left ventricle associated with a mild degree of teja-infarct ischemia.  - had cardiac cath 2/20/24 noted mild to moderate non-obstructive CAD    - diuresed with intermittent IV Lasix along with PO Lasix and clinically much improved; oxygen weaned down to room air and currently appears euvolumic  - cardiology signed off 2/21, suggested a diuretic holiday for 48 hrs due to wrosened renal failure (see below); can probably resume on 2/24/24 if renal function improved to stable  - continue aspirin, Lipitor, Zetia, Toprol-XL; holding lisinopril due to acute renal failure  - discontinued PTA amlodipine       Mild JANET:  - Creatinine on presentation was 1.08, progressively up trended with ongoing diuresis up to 1.6 (2/22)  - holding PTA lisinopril and diuretics holiday as noted above  - will order for NS 50 ml/hr X 10 hrs (2/22) and repeat BMP for 2/23     Depression/Anxiety   - Continued PTA bupropion and citalopram         Recent fall with left wrist pain  distal radial metaphysis fracture  - had a fall almost a month ago at home reportedly mechanical in nature following which she did not have any concerns for ongoing pain and was never evaluated in clinic or as outpatient but has been wearing wrist stabilizer  - XRay left wrist (2/21) noted 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, STT, and first MCP joints and possible acute nondisplaced fracture of the distal radial metaphysis with subtle cortical lucency and step-off involving the base of the radial styloid  - evaluated by orthopedics and ordered for left wrist splint; placed by orthotist (2/22)    DVT Prophylaxis: Pneumatic Compression Devices    Code Status: No CPR- Do NOT Intubate   "    Diet:   Fluid restriction 2000 ML FLUID (and additional linked orders)  2 Gram Sodium Diet      Disposition:   - likely 1 to 2 days pending clinical stability and improvement in renal function  - PT/OT rec home with assist with home cares    Clinically Significant Risk Factors                 # Acute Kidney Injury, unspecified: based on a >150% or 0.3 mg/dL increase in last creatinine compared to past 90 day average, will monitor renal function               # Financial/Environmental Concerns: none              Page Me (7 am to 6 pm)    Care plan discussed with patient and nursing; also discussed with orthopedics              Physical Exam:      Blood pressure 106/61, pulse 70, temperature 97.5  F (36.4  C), temperature source Oral, resp. rate 16, height 1.575 m (5' 2\"), weight 51.7 kg (113 lb 15.7 oz), SpO2 93%.  Vitals:    02/19/24 0711 02/20/24 0643 02/22/24 0609   Weight: 54.2 kg (119 lb 7.8 oz) 54.9 kg (121 lb) 51.7 kg (113 lb 15.7 oz)     Vital Signs with Ranges  Temp:  [97.5  F (36.4  C)-98.5  F (36.9  C)] 97.5  F (36.4  C)  Pulse:  [70-81] 70  Resp:  [16-18] 16  BP: (102-111)/(59-65) 106/61  SpO2:  [92 %-93 %] 93 %  I/O's Last 24 hours  I/O last 3 completed shifts:  In: 815 [P.O.:815]  Out: 450 [Urine:450]    Constitutional: Alert, awake and oriented X 3; resting comfortably in no apparent distress; very hard of hearing       Oral cavity: Moist mucosa   Cardiovascular: Normal s1 s2, regular rate and rhythm, no murmur   Lungs: B/l clear to auscultation, no wheezes or crepitations   Abdomen: Soft, nt, nd, no guarding, rigidity or rebound; BS +   LE : No edema   Musculoskeletal/Neuro Power 5/5 in all extremities; No focal neurological deficits noted; noted left wrist tenderness   Psychiatry: normal mood and affect                Medications:         aspirin  81 mg Oral Daily    atorvastatin  10 mg Oral QPM    buPROPion  100 mg Oral BID    citalopram  20 mg Oral QAM    ezetimibe  5 mg Oral QAM    [Held by " "provider] furosemide  20 mg Oral Daily    [Held by provider] lisinopril  5 mg Oral Daily    metoprolol succinate ER  25 mg Oral Daily    sodium chloride (PF)  3 mL Intracatheter Q8H     PRN Meds: - MEDICATION INSTRUCTIONS -, acetaminophen, calcium carbonate, fentaNYL, gabapentin, HOLD MEDICATION, lidocaine 4%, lidocaine (buffered or not buffered), midazolam, ondansetron **OR** ondansetron, oxyCODONE IR **OR** oxyCODONE, ACE/ARB/ARNI NOT PRESCRIBED, BETA BLOCKER NOT PRESCRIBED, senna-docusate **OR** senna-docusate, sodium chloride (PF)         Data:      All new lab and imaging data was reviewed.   Recent Labs   Lab Test 02/21/24  0805 02/18/24  0850 02/17/24  1038   WBC 7.4 6.2 6.6   HGB 11.9 11.3* 11.3*   MCV 89 90 90    228 245      Recent Labs   Lab Test 02/22/24  0029 02/21/24  0948 02/21/24  0805 02/20/24  1041     --  141 140   POTASSIUM 3.6  --  3.9 4.2   CHLORIDE 98  --  101 104   CO2 30*  --  24 27   BUN 40.2*  --  34.2* 27.6*   CR 1.64*  --  1.30* 1.13*   ANIONGAP 10  --  16* 9   DANIELA 8.4  --  8.6 8.6   * 171* 65* 96     No lab results found.    Invalid input(s): \"TROP\", \"TROPONINIES\"      "

## 2024-02-23 VITALS
TEMPERATURE: 97.4 F | BODY MASS INDEX: 20.8 KG/M2 | DIASTOLIC BLOOD PRESSURE: 89 MMHG | WEIGHT: 113 LBS | HEIGHT: 62 IN | RESPIRATION RATE: 18 BRPM | HEART RATE: 85 BPM | SYSTOLIC BLOOD PRESSURE: 142 MMHG | OXYGEN SATURATION: 95 %

## 2024-02-23 LAB
ANION GAP SERPL CALCULATED.3IONS-SCNC: 8 MMOL/L (ref 7–15)
BUN SERPL-MCNC: 30.3 MG/DL (ref 8–23)
CALCIUM SERPL-MCNC: 8.2 MG/DL (ref 8.2–9.6)
CHLORIDE SERPL-SCNC: 105 MMOL/L (ref 98–107)
CREAT SERPL-MCNC: 1.31 MG/DL (ref 0.51–0.95)
DEPRECATED HCO3 PLAS-SCNC: 28 MMOL/L (ref 22–29)
EGFRCR SERPLBLD CKD-EPI 2021: 38 ML/MIN/1.73M2
GLUCOSE SERPL-MCNC: 86 MG/DL (ref 70–99)
POTASSIUM SERPL-SCNC: 3.8 MMOL/L (ref 3.4–5.3)
SODIUM SERPL-SCNC: 141 MMOL/L (ref 135–145)

## 2024-02-23 PROCEDURE — 99239 HOSP IP/OBS DSCHRG MGMT >30: CPT | Performed by: HOSPITALIST

## 2024-02-23 PROCEDURE — 250N000013 HC RX MED GY IP 250 OP 250 PS 637: Performed by: PHYSICIAN ASSISTANT

## 2024-02-23 PROCEDURE — 82374 ASSAY BLOOD CARBON DIOXIDE: CPT | Performed by: HOSPITALIST

## 2024-02-23 PROCEDURE — 82565 ASSAY OF CREATININE: CPT | Performed by: HOSPITALIST

## 2024-02-23 PROCEDURE — 250N000013 HC RX MED GY IP 250 OP 250 PS 637: Performed by: STUDENT IN AN ORGANIZED HEALTH CARE EDUCATION/TRAINING PROGRAM

## 2024-02-23 PROCEDURE — 36415 COLL VENOUS BLD VENIPUNCTURE: CPT | Performed by: HOSPITALIST

## 2024-02-23 RX ORDER — ASPIRIN 81 MG/1
81 TABLET ORAL DAILY
Qty: 30 TABLET | Refills: 0 | Status: SHIPPED | OUTPATIENT
Start: 2024-02-24 | End: 2024-03-04

## 2024-02-23 RX ORDER — FUROSEMIDE 20 MG
20 TABLET ORAL DAILY
Qty: 30 TABLET | Refills: 0 | Status: SHIPPED | OUTPATIENT
Start: 2024-02-25 | End: 2024-03-20

## 2024-02-23 RX ORDER — METOPROLOL SUCCINATE 25 MG/1
25 TABLET, EXTENDED RELEASE ORAL DAILY
Qty: 30 TABLET | Refills: 0 | Status: SHIPPED | OUTPATIENT
Start: 2024-02-24 | End: 2024-03-20

## 2024-02-23 RX ADMIN — BUPROPION HYDROCHLORIDE 100 MG: 100 TABLET, FILM COATED ORAL at 08:55

## 2024-02-23 RX ADMIN — METOPROLOL SUCCINATE 25 MG: 25 TABLET, EXTENDED RELEASE ORAL at 08:55

## 2024-02-23 RX ADMIN — ACETAMINOPHEN 1000 MG: 500 TABLET, FILM COATED ORAL at 02:14

## 2024-02-23 RX ADMIN — ASPIRIN 81 MG: 81 TABLET, COATED ORAL at 08:56

## 2024-02-23 RX ADMIN — EZETIMIBE 5 MG: 10 TABLET ORAL at 08:56

## 2024-02-23 RX ADMIN — CITALOPRAM HYDROBROMIDE 20 MG: 20 TABLET ORAL at 08:56

## 2024-02-23 ASSESSMENT — ACTIVITIES OF DAILY LIVING (ADL)
ADLS_ACUITY_SCORE: 32

## 2024-02-23 NOTE — PROGRESS NOTES
Care Management Discharge Note    Discharge Date: 02/23/2024       Discharge Disposition:      Discharge Services:      Discharge DME:      Discharge Transportation: family or friend will provide    Private pay costs discussed: Not applicable    Does the patient's insurance plan have a 3 day qualifying hospital stay waiver?  No    PAS Confirmation Code:    Patient/family educated on Medicare website which has current facility and service quality ratings:      Education Provided on the Discharge Plan:    Persons Notified of Discharge Plans: bedside nurse  Patient/Family in Agreement with the Plan: yes    Handoff Referral Completed: No    Additional Information:  Patient discharging today.    She has follow up appointment for PCP on the AVS.    Follow up CORE clinic appointments are on the AVS.    She has Orthopedic instructions for limitations and care for her wrist which includes number to call for follow up.     She has a DME for platform walker - rehab aide notified and is providing.    Family here to transport.      Annie Lewis RN  Inpatient Float Care Coordinator  [facility]

## 2024-02-23 NOTE — PROGRESS NOTES
[id number] 2/22/24    Summary:Resp Failure- resolved; Acute systolic CHF     Orientation:A&Ox4,Hard of hearing in R ear    Vitals/Tele:VSS on room air,PRN Tylenol x 1    IV Access/drains:R PIV     Diet: 2 gram Na/2000ml FR    Mobility:SBA with gait belt and walker    GI/:cont B/B, walk to the bathroom     Wound/Skin:intact/Lwrist swelling     Consults:PT/OT/Cardiology    Discharge Plan: Pending

## 2024-02-23 NOTE — PLAN OF CARE
Physical Therapy Discharge Summary    Reason for therapy discharge:    Discharged to home with home therapy and family assist.     Progress towards therapy goal(s). See goals on Care Plan in [facility] electronic health record for goal details.  Goals not met.  Barriers to achieving goals:   discharge from facility.    Therapy recommendation(s):    Continued therapy is recommended.  Rationale/Recommendations:  Patient is currently requiring close SBA to CGA for balance with OOB mobility (below baseline IND level without AD). Daughter states that family will be able to provide the pt 24/hr assist at d/c and prefers to take the pt home. Recommend home PT services to maximize pt's return to baseline. Recommend left platform walker issued at d/c-order placed.

## 2024-02-23 NOTE — DISCHARGE SUMMARY
[facility]  Discharge Summary        Teresa Krishna MRN# [id number]   YOB: 1930 Age: 89 year old     Date of Admission:  2/16/2024  Date of Discharge:  2/23/2024  Admitting Physician:  Yoana Guy MD  Discharge Physician: Luke Parr MD  Discharging Service: Hospitalist     Primary Provider: Lisa Raymundo  Primary Care Physician Phone Number: [phone]         Discharge Diagnoses/Problem Oriented Hospital Course (Providers):    Teresa Krishna was admitted on 2/16/2024 by Yoana Guy MD and I would refer you to their history and physical.  The following problems were addressed during her hospitalization:    Teresa Krishna is a 89-year-old female with PMH of hypertension, hyperlipidemia, depression who presented with acute worsening shortness of breath and found to be in acute systolic congestive heart failure and respiratory distress started on BiPAP and IV diuretic , admitted inpatient 2/16/24.      Acute Systolic Congestive heart failure, new diagnosis: Improved  Type 2 NSTEMI likely secondary to above  Moderate to severe aortic stenosis   Acute Hypoxic Hypercapnic Respiratory failure: Resolved  Mild to mod non-obstructive CAD ( cath 2/20/24)  Lactic acidosis: Resolved  Hypertension  - PTA on amlodipine 5 mg daily, not on any diuretics  - on presentation was requiring BiPAP for hypoxia (O2 sat in 70s) /respiratory distress  - on initial eval, BNP 15,783; troponin T 35---74--- 65  - CXR 2/16 with mild to moderate cardiomegaly with small bilateral pleural effusions and pulmonary edema. No confluent airspace consolidation or pneumothorax.   * COVID, flu, RSV negative.   * TTE 2/16/24 noted LVEF of 32%. Diastolic doppler findings suggest increased LV filling pressures. There is also moderate to severe aortic stenosis.  - Lexiscan 2/19/24 noted abnormal with a small area of nontransmural infarction in the mid to distal anteroseptal segment(s) of the left  ventricle associated with a mild degree of teja-infarct ischemia.  - had cardiac cath 2/20/24 noted mild to moderate non-obstructive CAD     - diuresed with intermittent IV Lasix along with PO Lasix and clinically much improved; oxygen weaned down to room air and currently appears euvolumic  - cardiology signed off 2/21, suggested a diuretic holiday for 48 hrs due to wrosened renal failure (see below); plan to resume Lasix 20 mg daily on 2/25/24   - continue aspirin, Lipitor, Zetia, Toprol-XL; lisinopril was started this admission but then held due to acute renal failure, can probably restart ACEI upon PCP follow up if renal function improved to baseline  - discontinued PTA amlodipine     Acute renal failure, likely due over diuresis-- improving  - Creatinine on presentation was 1.08, progressively up trended with ongoing diuresis up to 1.6 (2/22)  - holding PTA lisinopril and diuretics holiday as noted above  - was given 500 ml NS over 10 hrs on 2/22/24  - renal function improving, Cr down to 1.31 from peak of 1.64  - plan to resume Lasix 2/25/24 but will continue to hold off on lisinopril until follow-up with PCP and can resume at that time if renal function improved     Depression/Anxiety   - Continued PTA bupropion and citalopram      Recent fall with left wrist pain  distal radial metaphysis fracture  - had a fall almost a month ago at home reportedly mechanical in nature following which she did not have any concerns for ongoing pain and was never evaluated in clinic or as outpatient but has been wearing wrist stabilizer  - XRay left wrist (2/21) noted 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   -No lifting more than a full coffee cup with the left hand. Okay for use of a platform walker.  -Encourage ROM of the digits, elbow, and shoulder.  -Encourage elevation and use of cold compresses for swelling.  -Follow up with Dr. Thelma Guido at [facility] in 2-3 weeks for repeat x-rays and reevaluation of the left wrist. Please call [phone] to schedule. \"     Code Status: No CPR- Do NOT Intubate       Disposition:   - PT/OT rec home with assist with home cares; ordered for home care PT/OT/RN; care management assisted with disposition    Clinically Significant Risk Factors                               # Financial/Environmental Concerns: none                      Brief Hospital Stay Summary Sent Home With Patient in AVS:        Reason for your hospital stay      You were admitted for shortness of breath, noted with heart failure and   you have been started on water pills.                Pending Results:        Unresulted Labs Ordered in the Past 30 Days of this Admission       No orders found from 1/17/2024 to 2/17/2024.              Discharge Instructions and Follow-Up:      Follow-up Appointments     Follow-up and recommended labs and tests       Please call to schedule a follow up appointment with Dr. Thelma Guido   ([facility] [place], [place], and [place]) in 2-3 weeks for   repeat x-rays and reevaluation of the left wrist. Please call Dr. Guido's   care coordinator, Anum, [phone] to set up the appointment or if   any questions or concerns arise regarding your orthopedic injury/surgery.   No tests/imaging are needed prior to this appointment.     If any questions arise after regular business hours or on weekends, please   call the on-call " orthopedic provider at [phone].        Follow-up and recommended labs and tests       Follow up with primary care provider, Lisa Raymundo, for hospital   follow- up.    - this appointment has been scheduled for Wednesday March 6 at 10:00 am   with Dr. Raymundo at [facility]  PCP to consider resuming Lisinopril for heart failure if renal function   improved.    Follow up with Orthopedics as suggested.                Discharge Disposition:      Discharged to home        Discharge Medications:        Current Discharge Medication List        START taking these medications    Details   aspirin 81 MG EC tablet Take 1 tablet (81 mg) by mouth daily for 30 days  Qty: 30 tablet, Refills: 0    Comments: Future refills by PCP Dr. Lisa Raymundo with phone number [phone].  Associated Diagnoses: Coronary artery disease involving native coronary artery of native heart without angina pectoris      furosemide (LASIX) 20 MG tablet Take 1 tablet (20 mg) by mouth daily for 30 days  Qty: 30 tablet, Refills: 0    Comments: Future refills by PCP Dr. Lisa Raymundo with phone number [phone].  Associated Diagnoses: Acute on chronic systolic heart failure (H)      metoprolol succinate ER (TOPROL XL) 25 MG 24 hr tablet Take 1 tablet (25 mg) by mouth daily for 30 days  Qty: 30 tablet, Refills: 0    Comments: Future refills by PCP Dr. Lisa Raymundo with phone number [phone].  Associated Diagnoses: Coronary artery disease involving native coronary artery of native heart without angina pectoris           CONTINUE these medications which have NOT CHANGED    Details   acetaminophen (TYLENOL) 500 MG tablet Take 500-1,000 mg by mouth every 6 hours as needed for mild pain      amoxicillin (AMOXIL) 500 MG capsule Take 500 mg by mouth Prior to dental appointments      buPROPion (WELLBUTRIN) 100 MG tablet Take 100 mg by mouth 2 times daily      citalopram (CELEXA) 20 MG tablet Take 20 mg by mouth every morning     "  ezetimibe (ZETIA) 5 mg TABS half-tab Take 5 mg by mouth every morning      gabapentin (NEURONTIN) 100 MG capsule Take 100 mg by mouth nightly as needed for other (pain, insomnia)      multivitamin, therapeutic (THERA-VIT) TABS tablet Take 1 tablet by mouth every evening      raloxifene (EVISTA) 60 MG tablet Take 60 mg by mouth every evening           STOP taking these medications       amLODIPine (NORVASC) 5 MG tablet Comments:   Reason for Stopping:                 Allergies:       No Known Allergies        Consultations This Hospital Stay:      Consultation during this admission received from cardiology and orthopedics        Condition and Physical on Discharge:        Discharge condition: Stable   Vitals: Blood pressure (!) 142/89, pulse 85, temperature 97.4  F (36.3  C), temperature source Oral, resp. rate 18, height 1.575 m (5' 2\"), weight 51.3 kg (113 lb), SpO2 95%.     Constitutional: Alert, awake and oriented X 3; resting comfortably in no apparent distress; very hard of hearing         Oral cavity: Moist mucosa   Cardiovascular: Normal s1 s2, regular rate and rhythm, no murmur   Lungs: B/l clear to auscultation, no wheezes or crepitations   Abdomen: Soft, nt, nd, no guarding, rigidity or rebound; BS +   LE : No edema   Musculoskeletal/Neuro Power 5/5 in all extremities; No focal neurological deficits noted; left wrist in splint   Psychiatry: normal mood and affect             Discharge Time:      Greater than 30 minutes.        Image Results From This Hospital Stay (For Non-EPIC Providers):        Results for orders placed or performed during the hospital encounter of 02/16/24   XR Chest Port 1 View    Narrative    EXAM: XR CHEST PORT 1 VIEW  LOCATION: [facility]  DATE: 2/16/2024    INDICATION: shortness of breath  COMPARISON: None.      Impression    IMPRESSION: Mild to moderate cardiomegaly with small bilateral pleural effusions and pulmonary edema. No confluent airspace " consolidation or pneumothorax.   XR Wrist Left G/E 3 Views    Narrative    EXAM: XR WRIST LEFT G/E 3 VIEWS  DATE/TIME: 2024 4:17 PM    INDICATION: left wrist pain  COMPARISON: None available.       Impression    IMPRESSION: Diffuse osseous demineralization. Advanced degenerative  arthrosis of the first CMC, STT, and first MCP joints.  Chondrocalcinosis of the TFCC.    Possible acute nondisplaced fracture of the distal radial metaphysis  with subtle cortical lucency and step-off involving the base of the  radial styloid. If clinically indicated MRI may be helpful for further  characterization.    NOTE: ABNORMAL REPORT    THE DICTATION ABOVE DESCRIBES AN ABNORMAL REPORT FOR WHICH FOLLOW-UP  IS NEEDED.    VIKTOR SERVIN DO         SYSTEM ID:  [id number]   Echocardiogram Complete     Value    Biplane LVEF 32%    Narrative    [id number]  [id number]  2010^AYAH^BREEZY^BA     [facility]  Echocardiography Laboratory  [address] [place] [address]     Name: DESHAUN DEAL  MRN: [id number]  : 1930  Study Date: 2024 09:00 AM  Age: 89 yrs  Gender: Female  Patient Location: [facility]  Reason For Study: CHF  Ordering Physician: BREEZY POON  Referring Physician: RADHA PMD  Performed By: Tao Nagel RDCS     BSA: 1.5 m2  Height: 60 in  Weight: 118 lb  HR: 92  BP: 97/69 mmHg  ______________________________________________________________________________  Procedure  Complete Portable Echo Adult. Optison (NDC #[id number]) given intravenously.  ______________________________________________________________________________  Interpretation Summary     The left ventricle is normal in size.  Diastolic Doppler findings (E/E' ratio and/or other parameters) suggest left  ventricular filling pressures are increased.  Biplane LVEF is 32%.  Moderate to severe valvular aortic stenosis.  ______________________________________________________________________________  Left  Ventricle  The left ventricle is normal in size. A sigmoid septum is present. Diastolic  Doppler findings (E/E' ratio and/or other parameters) suggest left ventricular  filling pressures are increased. Biplane LVEF is 32%. Septal motion is  consistent with conduction abnormality.     Right Ventricle  The right ventricle is normal in size and function.     Atria  The left atrium is moderately dilated. Right atrial size is normal. There is  no color Doppler evidence of an atrial shunt.     Mitral Valve  The mitral valve leaflets are mildly thickened. There is mild (1+) mitral  regurgitation.     Tricuspid Valve  There is mild (1+) tricuspid regurgitation. The right ventricular systolic  pressure is approximated at 37.0 mmHg plus the right atrial pressure. IVC  diameter and respiratory changes fall into an intermediate range suggesting an  RA pressure of 8 mmHg.     Aortic Valve  There is mild (1+) aortic regurgitation. The mean AoV pressure gradient is  30.0 mmHg. Moderate to severe valvular aortic stenosis.     Pulmonic Valve  There is trace pulmonic valvular regurgitation.     Vessels  Normal size aorta. The aortic root is normal size.     Pericardium  There is no pericardial effusion.     Rhythm  Sinus rhythm was noted.  ______________________________________________________________________________  MMode/2D Measurements & Calculations  IVSd: 1.1 cm     LVIDd: 4.3 cm  LVIDs: 3.7 cm  LVPWd: 1.2 cm  FS: 14.2 %  LV mass(C)d: 177.1 grams  LV mass(C)dI: 118.8 grams/m2  Ao root diam: 2.8 cm  LA dimension: 3.6 cm  LA/Ao: 1.3  LVOT diam: 1.9 cm  LVOT area: 2.9 cm2  Ao root diam index Ht(cm/m): 1.8  Ao root diam index BSA (cm/m2): 1.9  RV Base: 3.6 cm  RWT: 0.58  TAPSE: 2.7 cm     Doppler Measurements & Calculations  MV E max prema: 67.4 cm/sec  MV A max prema: 178.9 cm/sec  MV E/A: 0.38  Ao V2 max: 365.5 cm/sec  Ao max P.4 mmHg  Ao V2 mean: 262.2 cm/sec  Ao mean P.0 mmHg  Ao V2 VTI: 76.2 cm  IVÁN(I,D): 0.99  cm2  IVÁN(V,D): 1.1 cm2  AI P1/2t: 308.5 msec  LV V1 max P.1 mmHg  LV V1 max: 133.6 cm/sec  LV V1 VTI: 26.0 cm     SV(LVOT): 75.5 ml  SI(LVOT): 50.6 ml/m2  PA acc time: 0.10 sec  TR max gabriel: 304.3 cm/sec  TR max P.0 mmHg  AV Gabriel Ratio (DI): 0.37  IVÁN Index (cm2/m2): 0.66  E/E' av.5  Lateral E/e': 5.7  Medial E/e': 17.2  RV S Gabriel: 14.1 cm/sec     ______________________________________________________________________________  Report approved by: Ruben Whitman 2024 10:42 AM         Cardiac Catheterization    Narrative      Prox RCA to Mid RCA lesion is 40% stenosed.    1st Diag-1 lesion is 45% stenosed.    Prox LAD lesion is 50% stenosed.    1st Diag-2 lesion is 40% stenosed.    Mid Cx lesion is 55% stenosed.    2nd Mrg lesion is 35% stenosed.    Left ventricular filling pressures are normal.    Right dominant coronary anatomy.  Obstructive coronary disease involving mid circumflex, which is not   hemodynamically significant-IFR 0.96.  Artery disease involving proximal LAD, which is also not hemodynamically   significant-IFR 0.91. Lesions are on tight bends and appear severe but   normal flow reserve. There is a diag vessel which is small and likely   could be flow limiting but this would not account for a global   hypokinesis/reduced EF scenario.  Med Rx recommended  Left ventricular end-diastolic pressure of 8 mmHg.  Transaortic mean gradient of 27 mmHg.  (Likely moderate aortic stenosis,   compare with echo results suggested) Although not accurate due to extreme   [id number] respiratory variations.      NM Lexiscan stress test (nuc card)     Value    Target     Baseline Systolic     Baseline Diastolic BP 90    Last Stress Systolic     Last Stress Diastolic BP 80    Baseline HR 70    Max HR  78    Max Predicted HR  61    Rate Pressure Product 10,920.0    Left Ventricular EF 27    Narrative      The nuclear stress test is abnormal.    There is a small area of nontransmural  "infarction in the mid to distal   anteroseptal segment(s) of the left ventricle associated with a mild   degree of teja-infarct ischemia.    Left ventricular function is severely reduced. The left ventricular   ejection fraction at stress is 27%.    There is no prior study for comparison.             Most Recent Lab Results In EPIC (For Non-EPIC Providers):    Most Recent 3 CBC's:  Recent Labs   Lab Test 02/21/24  0805 02/18/24  0850 02/17/24  1038   WBC 7.4 6.2 6.6   HGB 11.9 11.3* 11.3*   MCV 89 90 90    228 245      Most Recent 3 BMP's:  Recent Labs   Lab Test 02/23/24  0843 02/22/24  0029 02/21/24  0948 02/21/24  0805    138  --  141   POTASSIUM 3.8 3.6  --  3.9   CHLORIDE 105 98  --  101   CO2 28 30*  --  24   BUN 30.3* 40.2*  --  34.2*   CR 1.31* 1.64*  --  1.30*   ANIONGAP 8 10  --  16*   DANIELA 8.2 8.4  --  8.6   GLC 86 112* 171* 65*     Most Recent 3 Troponin's:No lab results found.    Invalid input(s): \"TROP\", \"TROPONINIES\"  Most Recent 3 INR's:No lab results found.  Most Recent 2 LFT's:  Recent Labs   Lab Test 02/16/24  0554   AST 37   ALT 20   ALKPHOS 110   BILITOTAL 0.3     Most Recent Cholesterol Panel:  Recent Labs   Lab Test 02/20/24  1041   CHOL 188   *   HDL 49*   TRIG 120     Most Recent 6 Bacteria Isolates From Any Culture (See EPIC Reports for Culture Details):No lab results found.  Most Recent TSH, T4 and HgbA1c:   Recent Labs   Lab Test 02/16/24  1256   A1C 5.2                 "

## 2024-02-23 NOTE — PROGRESS NOTES
Occupational Therapy Discharge Summary    Reason for therapy discharge:    Discharged to home with home therapy.    Progress towards therapy goal(s). See goals on Care Plan in [facility] electronic health record for goal details.  Goals partially met.  Barriers to achieving goals:   discharge from facility.    Therapy recommendation(s):    Further therapy to address safety and IND in I/ADLs.       *Patient not seen by writer on this date, note written based on previous treating therapist's notes and recommendations.

## 2024-02-23 NOTE — PLAN OF CARE
Goal Outcome Evaluation:      Plan of Care Reviewed With: patient    Overall Patient Progress: improvingOverall Patient Progress: improving       Summary: Resp Failure- resolved; Acute systolic CHF     DATE & TIME: 02/22/2024 [id number]    Cognitive Concerns/ Orientation: A&O x4. Forgetful of situation once during shift. Deaf in L ear, very [place] in R Ear with impaired hearing aid  BEHAVIOR & AGGRESSION TOOL COLOR: Green   ABNL VS/O2: VSS on RA  MOBILITY: SBA with gait belt  PAIN MANAGMENT: Denied pain this shift   DIET: Reg diet  BOWEL/BLADDER: Cont B/B. Using BR  ABNL LAB/BG: K 3.6, Mg 2.3, Ph 4.9  DRAIN/DEVICES: R PIV SL, Strict I/Os  TELEMETRY RHYTHM: n/a  SKIN: WDL, pale, mild redness and swelling of L wrist, per daughter, pt had a fall at home on 1/26/24.  TESTS/PROCEDURES: None  D/C DAY/GOALS/PLACE: Pending Cardiology clearance.     OTHER IMPORTANT INFO: Angio site on Rt inner thigh-Old dried blood otherwise intact. VALDIVIA. Need Orthopedic trauma surgery ordered.

## 2024-02-23 NOTE — PLAN OF CARE
Goal Outcome Evaluation:      Plan of Care Reviewed With: patient    Summary: Resp Failure- resolved; Acute systolic CHF   DATE & TIME: 2/22/24 1189-2475  Cognitive Concerns/ Orientation : A&Ox4. Forgetful of situation once during shift. Deaf in L ear, very [place] in R Ear with impaired hearing aid. Calm,cooperative, and pleasant.   BEHAVIOR & AGGRESSION TOOL COLOR: Green   ABNL VS/O2: VSS on RA  MOBILITY: SBA with gait belt,   PAIN MANAGMENT: PRN Tylenol given x1 for L wrist pain oxy x2  DIET: Reg diet, fair appetite   BOWEL/BLADDER: Cont B/B. Using BR  ABNL LAB/BG: Electrolytes in limit redraw   DRAIN/DEVICES: R PIV SL, Strict I/Os  TELEMETRY RHYTHM: discontinued  SKIN: WDL, pale, mild redness and swelling of L wrist, per daughter, pt had a fall at home on 1/26/24.  TESTS/PROCEDURES: None  D/C DAY/GOALS/PLACE: Pending Cardiology clearance.   OTHER IMPORTANT INFO: Angio site on Rt inner thigh-Old dried blood otherwise intact. VALDIVIA.  Need Orthopedic trauma surgery ordered.

## 2024-02-23 NOTE — PROGRESS NOTES
"Patient seen and examined    -Respiratory status stable on room air; denies any active complaints  -renal function improving, Cr down to 1.31 from peak of 1.64  -will resume Lasix 2/25/24 but will continue to hold off on lisinopril until follow-up with PCP and can resume at that time if renal function improved  - therapy rec Home with assist/ home cares; SW/CM following for disposition    - per Orthopedics:  \"Brace to be worn with activity such as when ambulating, in public, or with sleeping (if too painful without a brace) for the next 2-3 weeks, but can otherwise be removed for skin checks, hygiene, and to apply ice.   -No lifting more than a full coffee cup with the left hand. Okay for use of a platform walker.  -Encourage ROM of the digits, elbow, and shoulder.  -Encourage elevation and use of cold compresses for swelling.  -Follow up with Dr. Thelma Guido at [facility] in 2-3 weeks for repeat x-rays and reevaluation of the left wrist. Please call [phone] to schedule. \"    Discharge home today with home care PT/OT    Care plan discussed with patient , nursing and care management    Please see discharge summary for details   "

## 2024-02-23 NOTE — DISCHARGE SUMMARY
Discharge    Patient discharged to home via car with daughter      Listed belongings gathered and given to patient (including from security/pharmacy). Yes  Care Plan and Patient education resolved: Yes  Prescriptions if needed, hard copies sent with patient  Yes  Medication Bin checked and emptied on discharge Yes  SW/care coordinator/charge RN aware of discharge: Yes    Summary: Resp Failure- resolved; Acute systolic CHF     DATE & TIME: 02/23/2024 [id number]  Cognitive Concerns/ Orientation: A&O x4. Calm, cooperative and pleasant. Forgetful of situation once during shift. Deaf in L ear, very [place] in R Ear with impaired hearing aid.b  BEHAVIOR & AGGRESSION TOOL COLOR: Green   ABNL VS/O2: VSS on RA  MOBILITY: SBA with gait belt  PAIN MANAGMENT: Denied pain this shift   DIET: Reg diet, 2000 mL fluid restriction  BOWEL/BLADDER: Cont B/B. Using BR  ABNL LAB/BG: Cr 1.31  DRAIN/DEVICES: R PIV SLremoved  TELEMETRY RHYTHM: n/a  SKIN: WDL, pale, mild redness and swelling of L wrist, per daughter, pt had a fall at home on 1/26/24. Splint in place.  TESTS/PROCEDURES: None  D/C DAY/GOALS/PLACE: Discharging home w/ daughter and home care.    OTHER IMPORTANT INFO: Angio site on Rt inner thigh-Old dried blood otherwise intact. VALDIVIA.

## 2024-02-26 ENCOUNTER — PATIENT OUTREACH (OUTPATIENT)
Dept: CARE COORDINATION | Facility: CLINIC | Age: 89
End: 2024-02-26
Payer: MEDICARE

## 2024-02-27 ENCOUNTER — TELEPHONE (OUTPATIENT)
Dept: CARDIOLOGY | Facility: CLINIC | Age: 89
End: 2024-02-27
Payer: MEDICARE

## 2024-02-27 NOTE — TELEPHONE ENCOUNTER
Patient was admitted to [facility] on 2/16/22 with acute hypoxic resp failure, progressive VALDIVIA x 1 year. Found to have reduced EF on TTE.    PMH: hypertension, hyperlipidemia, and coronary atherosclerosis based on prior CT calcium score.     2/16/24: Echo showed EF of 32%. Diastolic doppler findings suggest increased LV filling pressures. There is also moderate to severe aortic stenosis.     2/19/24: NM Lexiscan showed small area of mid-distal anteroseptal infarct with mid teja-infarct ischemia.    2/20/24: Coronary angiogram via RFA showed:      Prox RCA to Mid RCA lesion is 40% stenosed.    1st Diag-1 lesion is 45% stenosed.    Prox LAD lesion is 50% stenosed.    1st Diag-2 lesion is 40% stenosed.    Mid Cx lesion is 55% stenosed.    2nd Mrg lesion is 35% stenosed.    Left ventricular filling pressures are normal.     Right dominant coronary anatomy.    IV Lasix diuresed.    Pt was started on ASA, Lasix and Metoprolol. PTA Norvasc has been discontinued at time of discharge.    Called patient to discuss any post hospital d/c questions, review discharge medication, and confirm f/u appts, phone answered by her daughter, Genesis. She denied any questions regarding pt's new or changes to PTA medications.     Patient has denies any chest pain, edema, or light headedness. Daughter states pt went to [facility] yesterday AM as she felt SOB. Was subsequently discharged back home with no new findings.    RFA is healing without swelling or bleeding.    RN confirmed with daughter that  patient is scheduled for labs on 3/4/24 at 1230, followed with an OV at 1350 with DENYS Pearl Bowen for CORE enrollment at our [place] Office. [place] CORE clinic phone number provided.    Instructed patient  should continue to weigh herself every AM, after waking and using the restroom, but before breakfast and medications. Call clinic for a weight gain of 2 lbs overnight or 5 lbs in a week. Low Na+ diet encouraged. Instructed to bring daily wt/BP diary and  medications with to f/u OV.     Advised to call clinic with any cardiac related questions or concerns prior to his appt. Daughter verbalized understanding and agreed with plan. MARTHA Mai RN.

## 2024-02-28 ENCOUNTER — DOCUMENTATION ONLY (OUTPATIENT)
Dept: CARDIOLOGY | Facility: CLINIC | Age: 89
End: 2024-02-28
Payer: MEDICARE

## 2024-02-28 ENCOUNTER — TELEPHONE (OUTPATIENT)
Dept: CARDIOLOGY | Facility: CLINIC | Age: 89
End: 2024-02-28
Payer: MEDICARE

## 2024-02-28 DIAGNOSIS — I50.21 ACUTE SYSTOLIC HEART FAILURE (H): Primary | ICD-10-CM

## 2024-02-28 NOTE — PROGRESS NOTES
[facility] - CORE Clinic    Faxed order to [facility] at [phone] to be drawn this Friday 3/1 for appt on 3/4 with SHIKHA Hussein. Lab appt cancelled. Spoke with Daughter Genesis to confirm cancellation of lab appt only.    Future Appointments   Date Time Provider Department Center   3/4/2024  1:50 PM Pearl Bowen PA-C [facility] CLIN     Sneha Crowder RN BAN   2:37 PM 2/28/2024    CORE nurse line M-F 8a-4p: [phone]

## 2024-02-28 NOTE — TELEPHONE ENCOUNTER
Pt is scheduled for a CORE Clinic appt on 03/04/24.      Pt with a history of systolic heart failure..  Medication list reviewed and pt is not currently on Entresto, Jardiance, Farxiga, and Invokana.    Please initiate coverage check for the above medications.  Sneha Gentile CMA ([facility])  02/28/24 957am            Entresto $168 for 30 days  Jardiance $149  Farxiga $142    Patient is meeting a high deductible and claim does not say what cost will be once that is met.    Invkana non formulary and will require a prior auth.    Thank you for allowing me to help with your patient  Julia Perera [facility]  Pharmacy Discharge Liaison [place] locations          Previous Messages

## 2024-03-04 ENCOUNTER — OFFICE VISIT (OUTPATIENT)
Dept: CARDIOLOGY | Facility: CLINIC | Age: 89
End: 2024-03-04
Payer: MEDICARE

## 2024-03-04 VITALS
HEART RATE: 70 BPM | BODY MASS INDEX: 22.09 KG/M2 | WEIGHT: 112.5 LBS | OXYGEN SATURATION: 96 % | HEIGHT: 60 IN | SYSTOLIC BLOOD PRESSURE: 130 MMHG | DIASTOLIC BLOOD PRESSURE: 74 MMHG

## 2024-03-04 DIAGNOSIS — I50.23 ACUTE ON CHRONIC SYSTOLIC HEART FAILURE (H): Primary | ICD-10-CM

## 2024-03-04 PROCEDURE — 99215 OFFICE O/P EST HI 40 MIN: CPT | Performed by: PHYSICIAN ASSISTANT

## 2024-03-04 RX ORDER — LISINOPRIL 2.5 MG/1
2.5 TABLET ORAL DAILY
Qty: 90 TABLET | Refills: 3 | Status: SHIPPED | OUTPATIENT
Start: 2024-03-04 | End: 2024-03-28

## 2024-03-04 NOTE — PROGRESS NOTES
Cardiology C.O.R.E (heart failure specialty) Clinic Progress Note    Teresa Park MRN# [id number]   YOB: 1930 Age: 89 year old     Primary cardiology team: Dr. Milligan         Assessment and Plan     In summary, Teresa Park presents today for a hospital follow up / CORE clinic enrollment visit.     HFrEF  EF: 30-35%  ACC/AHA stage C; FC II  Etiology: possible LBBB induced, +/- valvular.  Valves: Moderate-severe aortic stenosis  Volume: Appears well-compensated today. Goal wt ~115 lbs. Today 112 lbs.   GDMT: Toprol XL 25 mg daily, amlodipine 5 mg daily.   Rhythm: SR  QRS: Wide - LBBB  Device: None  Code status: DNR/DNI  Creatinine: 1.2  CHF admissions: 2/2024 & 3/2024    HTN.  Controlled.     Plan:  Start lisinopril at 2.5 mg daily.   Entresto/SGLT2i therapy will be too expensive for her.   Continue furosemide 20 mg daily. May take an extra dose for shortness of breath. If no improvement with an extra dose, CALL CORE clinic.   Start recording BP's at home with home health.  Overall conservative management.     Follow-up:  With my colleagues in 2 & 4 weeks with BMP/proBNP.       Pearl Bowen PA-C  [facility] - Heart Clinic         History of Presenting Illness     Teresa Park is a pleasant 89 year old patient with a pertinent history of the following -   NICMP, EF 30-35%.   LBBB.  Moderate-severe aortic stenosis.  Mild-moderate CAD.  HTN.  HLP.    Recently admitted in February with new systolic CMP (EF 32%), HFrEF, and dx with mild-mod nonobstructive CAD on angiogram. LVEDP was 8 ~ a wt of 120 lbs. Discharged with aspirin, statin, Toprol, and furosemide 20 mg daily.     Discharged again from [facility] two days ago from another admission with acute HFrEF (sudden onset dyspnea in the AM without wt gain) requiring BiPAP, NTG GTT for hypertensive urgency and IV diuresis. She was discharged with her PTA furosemide 20 mg daily. placed on amlodipine for HTN. No clear trigger  for the admission.    Today, Teresa returns to clinic with her daughter, well-kept and appearing in good spirits. She is very hard of hearing. Patient denies chest pain, shortness of breath, PND, orthopnea (but does prop herself up with a couple pillows; getting a hospital bed in near future), edema, claudication, palpitations, near syncope or syncope. Lives independently, children live in the area and check in on her daily. Receiving [facility]. Receiving [employer] kitchen low sodium meal delivery. BP today is controlled at 130/74 mmHg. Creat on last discharge 1.2.          Review of Systems     12-pt ROS is negative except for as noted in the HPI.          Physical Exam     Vitals: /74   Pulse 70   Ht 1.524 m (5')   Wt 51 kg (112 lb 8 oz)   SpO2 96%   BMI 21.97 kg/m    Wt Readings from Last 10 Encounters:   03/04/24 51 kg (112 lb 8 oz)   02/22/24 51.3 kg (113 lb)   04/10/18 62.1 kg (137 lb)       Constitutional:  Patient is pleasant, alert, cooperative, and in NAD.  HEENT:  NCAT. PERRLA. EOM's intact.   Neck:  CVP appears normal. No carotid bruits.   Pulmonary: Normal respiratory effort. CTAB.   Cardiac: RRR, normal S1/S2, no S3/S4,grade 3/6 ANA at the RUSB.   Abdomen:  Non-tender abdomen, no hepatosplenomegaly appreciated.   Vascular: Pulses in the upper and lower extremities are 2+ and equal bilaterally.  Extremities: No edema, erythema, cyanosis or tenderness appreciated.  Skin:  No rashes or lesions appreciated.   Neurological:  No gross motor or sensory deficits.   Psych: Appropriate affect.          Data   Labs reviewed:  Recent Labs   Lab Test 02/21/24  1405 02/20/24  1041   LDL  --  115*   HDL  --  49*   NHDL  --  139*   CHOL  --  188   TRIG  --  120   NTBNP 21,961*  --        Lab Results   Component Value Date    WBC 7.4 02/21/2024    RBC 4.01 02/21/2024    HGB 11.9 02/21/2024    HCT 35.7 02/21/2024    MCV 89 02/21/2024    MCH 29.7 02/21/2024    MCHC 33.3 02/21/2024     "RDW 14.6 02/21/2024     02/21/2024       Lab Results   Component Value Date     02/23/2024    POTASSIUM 3.8 02/23/2024    POTASSIUM 4.2 02/16/2024    CHLORIDE 105 02/23/2024    CO2 28 02/23/2024    ANIONGAP 8 02/23/2024    GLC 86 02/23/2024     (H) 02/21/2024    BUN 30.3 (H) 02/23/2024    CR 1.31 (H) 02/23/2024    GFRESTIMATED 38 (L) 02/23/2024    DANIELA 8.2 02/23/2024      Lab Results   Component Value Date    AST 37 02/16/2024    ALT 20 02/16/2024       Lab Results   Component Value Date    A1C 5.2 02/16/2024       No results found for: \"INR\"         Problem List     Patient Active Problem List   Diagnosis    Acute pulmonary edema (H)    SOB (shortness of breath)    Acute respiratory failure with hypoxia and hypercapnia (H)    Acute kidney failure with tubular necrosis (H)            Medications     Current Outpatient Medications   Medication Sig Dispense Refill    acetaminophen (TYLENOL) 500 MG tablet Take 500-1,000 mg by mouth every 6 hours as needed for mild pain      AmLODIPine Besylate (NORVASC PO) Take 5 mg by mouth daily      amoxicillin (AMOXIL) 500 MG capsule Take 500 mg by mouth Prior to dental appointments      aspirin 81 MG tablet Take by mouth daily      buPROPion (WELLBUTRIN) 100 MG tablet Take 100 mg by mouth 2 times daily      citalopram (CELEXA) 20 MG tablet Take 20 mg by mouth every morning      furosemide (LASIX) 20 MG tablet Take 1 tablet (20 mg) by mouth daily for 30 days 30 tablet 0    gabapentin (NEURONTIN) 100 MG capsule Take 100 mg by mouth nightly as needed for other (pain, insomnia)      metoprolol succinate ER (TOPROL XL) 25 MG 24 hr tablet Take 1 tablet (25 mg) by mouth daily for 30 days 30 tablet 0    multivitamin, therapeutic with minerals (MULTI-VITAMIN) TABS Take 1 tablet by mouth daily      Atorvastatin Calcium (LIPITOR PO) Take 40 mg by mouth At Bedtime (Patient not taking: Reported on 3/4/2024)      ezetimibe (ZETIA) 5 mg TABS half-tab Take 5 mg by mouth every " morning (Patient not taking: Reported on 3/4/2024)      raloxifene (EVISTA) 60 MG tablet Take 60 mg by mouth daily (Patient not taking: Reported on 3/4/2024)              Past Medical History   No past medical history on file.  Past Surgical History:   Procedure Laterality Date    CV CORONARY ANGIOGRAM N/A 2/20/2024    Procedure: Coronary Angiogram;  Surgeon: Yang Mitchell MD;  Location:  HEART CARDIAC CATH LAB    CV INSTANTANEOUS WAVE-FREE RATIO N/A 2/20/2024    Procedure: Instantaneous Wave-Free Ratio;  Surgeon: Yang Mitchell MD;  Location:  HEART CARDIAC CATH LAB    CV LEFT HEART CATH N/A 2/20/2024    Procedure: Left Heart Catheterization;  Surgeon: Yang Mitchell MD;  Location:  HEART CARDIAC CATH LAB     No family history on file.  Social History     Socioeconomic History    Marital status: Single     Spouse name: Not on file    Number of children: Not on file    Years of education: Not on file    Highest education level: Not on file   Occupational History    Not on file   Tobacco Use    Smoking status: Not on file    Smokeless tobacco: Former   Substance and Sexual Activity    Alcohol use: No    Drug use: No    Sexual activity: Not on file   Other Topics Concern    Not on file   Social History Narrative    ** Merged History Encounter **          Social Determinants of Health     Financial Resource Strain: Not on file   Food Insecurity: Not on file   Transportation Needs: Not on file   Physical Activity: Not on file   Stress: Not on file   Social Connections: Not on file   Interpersonal Safety: Not on file   Housing Stability: Not on file            Allergies   Morphine    Today's clinic visit entailed:  Review of the result(s) of each unique test - echo, BMP, proBNP  Prescription drug management  I spent a total of 40 minutes on the day of the visit.   Time spent by me doing chart review, history and exam, documentation and further activities per the note  Provider  Link to [facility] Help Grid      The level of medical decision making during this visit was of high complexity.    The longitudinal plan of care for the diagnosis(es)/condition(s) as documented were addressed during this visit. Due to the added complexity in care, I will continue to support Teresa in the subsequent management and with ongoing continuity of care.

## 2024-03-04 NOTE — PATIENT INSTRUCTIONS
Today, we discussed the following:   - Medication changes:    Start lisinopril at 2.5 mg daily.   Continue furosemide 20 mg daily. Take an extra tab for any worsening shortness of breath. If no improvement with that extra dose, CALL CORE clinic.   Start recording BP's at home.  - Follow up:   My colleagues in 2 & 4 weeks with NON-fasting labs prior.   ________________________________________________________________________  Please, remember to continue the followin.  Weigh yourself daily. Call if your weight is up > than 2 pounds in one day, or 5 pounds in one week; if you feel more short of breath or have worsening swelling in your legs or abdomen.  2.  Eat a low sodium diet (less than 2,000mg or 2g daily). If you eat less salt, you will retain less fluid.   3.  Avoid alcohol, as this can worsen heart failure.   4.  Avoid NSAIDs as able (For example, Ibuprofen / aleve / advil / naprosen / diclofenac).    Please call CORE nurse for any questions or concerns Mon-Fri 8am-4pm:   [phone]  Scheduling phone number:   [phone]    Thank you for visiting with us today.   Pearl Bowen PA-C  ______________________________________________________________

## 2024-03-04 NOTE — LETTER
3/4/2024    Lisa Raymundo MD  [facility] [place] [address] [place] [address]    RE: Teresa Park       Dear Colleague,     I had the pleasure of seeing Teresa Park in the [facility].    Cardiology C.O.R.E (heart failure specialty) Clinic Progress Note    Teresa Park MRN# [id number]   YOB: 1930 Age: 89 year old     Primary cardiology team: Dr. Milligan         Assessment and Plan     In summary, Teresa Park presents today for a hospital follow up / CORE clinic enrollment visit.     HFrEF  EF: 30-35%  ACC/AHA stage C; FC II  Etiology: possible LBBB induced, +/- valvular.  Valves: Moderate-severe aortic stenosis  Volume: Appears well-compensated today. Goal wt ~115 lbs. Today 112 lbs.   GDMT: Toprol XL 25 mg daily, amlodipine 5 mg daily.   Rhythm: SR  QRS: Wide - LBBB  Device: None  Code status: DNR/DNI  Creatinine: 1.2  CHF admissions: 2/2024 & 3/2024    HTN.  Controlled.     Plan:  Start lisinopril at 2.5 mg daily.   Entresto/SGLT2i therapy will be too expensive for her.   Continue furosemide 20 mg daily. May take an extra dose for shortness of breath. If no improvement with an extra dose, CALL CORE clinic.   Start recording BP's at home with home health.  Overall conservative management.     Follow-up:  With my colleagues in 2 & 4 weeks with BMP/proBNP.       Pearl Bowen PA-C  [facility] - Heart Clinic         History of Presenting Illness     Teresa Park is a pleasant 89 year old patient with a pertinent history of the following -   NICMP, EF 30-35%.   LBBB.  Moderate-severe aortic stenosis.  Mild-moderate CAD.  HTN.  HLP.    Recently admitted in February with new systolic CMP (EF 32%), HFrEF, and dx with mild-mod nonobstructive CAD on angiogram. LVEDP was 8 ~ a wt of 120 lbs. Discharged with aspirin, statin, Toprol, and furosemide 20 mg daily.     Discharged again from [facility] two days ago from another  admission with acute HFrEF (sudden onset dyspnea in the AM without wt gain) requiring BiPAP, NTG GTT for hypertensive urgency and IV diuresis. She was discharged with her PTA furosemide 20 mg daily. placed on amlodipine for HTN. No clear trigger for the admission.    Today, Teresa returns to clinic with her daughter, well-kept and appearing in good spirits. She is very hard of hearing. Patient denies chest pain, shortness of breath, PND, orthopnea (but does prop herself up with a couple pillows; getting a hospital bed in near future), edema, claudication, palpitations, near syncope or syncope. Lives independently, children live in the area and check in on her daily. Receiving [facility] home care from 10p-10a. Receiving [employer] kitchen low sodium meal delivery. BP today is controlled at 130/74 mmHg. Creat on last discharge 1.2.          Review of Systems     12-pt ROS is negative except for as noted in the HPI.          Physical Exam     Vitals: /74   Pulse 70   Ht 1.524 m (5')   Wt 51 kg (112 lb 8 oz)   SpO2 96%   BMI 21.97 kg/m    Wt Readings from Last 10 Encounters:   03/04/24 51 kg (112 lb 8 oz)   02/22/24 51.3 kg (113 lb)   04/10/18 62.1 kg (137 lb)       Constitutional:  Patient is pleasant, alert, cooperative, and in NAD.  HEENT:  NCAT. PERRLA. EOM's intact.   Neck:  CVP appears normal. No carotid bruits.   Pulmonary: Normal respiratory effort. CTAB.   Cardiac: RRR, normal S1/S2, no S3/S4,grade 3/6 ANA at the RUSB.   Abdomen:  Non-tender abdomen, no hepatosplenomegaly appreciated.   Vascular: Pulses in the upper and lower extremities are 2+ and equal bilaterally.  Extremities: No edema, erythema, cyanosis or tenderness appreciated.  Skin:  No rashes or lesions appreciated.   Neurological:  No gross motor or sensory deficits.   Psych: Appropriate affect.          Data   Labs reviewed:  Recent Labs   Lab Test 02/21/24  1405 02/20/24  1041   LDL  --  115*   HDL  --  49*   NHDL  --  139*   CHOL  --   "188   TRIG  --  120   NTBNP 21,961*  --        Lab Results   Component Value Date    WBC 7.4 02/21/2024    RBC 4.01 02/21/2024    HGB 11.9 02/21/2024    HCT 35.7 02/21/2024    MCV 89 02/21/2024    MCH 29.7 02/21/2024    MCHC 33.3 02/21/2024    RDW 14.6 02/21/2024     02/21/2024       Lab Results   Component Value Date     02/23/2024    POTASSIUM 3.8 02/23/2024    POTASSIUM 4.2 02/16/2024    CHLORIDE 105 02/23/2024    CO2 28 02/23/2024    ANIONGAP 8 02/23/2024    GLC 86 02/23/2024     (H) 02/21/2024    BUN 30.3 (H) 02/23/2024    CR 1.31 (H) 02/23/2024    GFRESTIMATED 38 (L) 02/23/2024    DANIELA 8.2 02/23/2024      Lab Results   Component Value Date    AST 37 02/16/2024    ALT 20 02/16/2024       Lab Results   Component Value Date    A1C 5.2 02/16/2024       No results found for: \"INR\"         Problem List     Patient Active Problem List   Diagnosis    Acute pulmonary edema (H)    SOB (shortness of breath)    Acute respiratory failure with hypoxia and hypercapnia (H)    Acute kidney failure with tubular necrosis (H)            Medications     Current Outpatient Medications   Medication Sig Dispense Refill    acetaminophen (TYLENOL) 500 MG tablet Take 500-1,000 mg by mouth every 6 hours as needed for mild pain      AmLODIPine Besylate (NORVASC PO) Take 5 mg by mouth daily      amoxicillin (AMOXIL) 500 MG capsule Take 500 mg by mouth Prior to dental appointments      aspirin 81 MG tablet Take by mouth daily      buPROPion (WELLBUTRIN) 100 MG tablet Take 100 mg by mouth 2 times daily      citalopram (CELEXA) 20 MG tablet Take 20 mg by mouth every morning      furosemide (LASIX) 20 MG tablet Take 1 tablet (20 mg) by mouth daily for 30 days 30 tablet 0    gabapentin (NEURONTIN) 100 MG capsule Take 100 mg by mouth nightly as needed for other (pain, insomnia)      metoprolol succinate ER (TOPROL XL) 25 MG 24 hr tablet Take 1 tablet (25 mg) by mouth daily for 30 days 30 tablet 0    multivitamin, therapeutic " with minerals (MULTI-VITAMIN) TABS Take 1 tablet by mouth daily      Atorvastatin Calcium (LIPITOR PO) Take 40 mg by mouth At Bedtime (Patient not taking: Reported on 3/4/2024)      ezetimibe (ZETIA) 5 mg TABS half-tab Take 5 mg by mouth every morning (Patient not taking: Reported on 3/4/2024)      raloxifene (EVISTA) 60 MG tablet Take 60 mg by mouth daily (Patient not taking: Reported on 3/4/2024)              Past Medical History   No past medical history on file.  Past Surgical History:   Procedure Laterality Date    CV CORONARY ANGIOGRAM N/A 2/20/2024    Procedure: Coronary Angiogram;  Surgeon: Yang Mitchell MD;  Location:  HEART CARDIAC CATH LAB    CV INSTANTANEOUS WAVE-FREE RATIO N/A 2/20/2024    Procedure: Instantaneous Wave-Free Ratio;  Surgeon: Yang Mitchell MD;  Location:  HEART CARDIAC CATH LAB    CV LEFT HEART CATH N/A 2/20/2024    Procedure: Left Heart Catheterization;  Surgeon: Yang Mitchell MD;  Location:  HEART CARDIAC CATH LAB     No family history on file.  Social History     Socioeconomic History    Marital status: Single     Spouse name: Not on file    Number of children: Not on file    Years of education: Not on file    Highest education level: Not on file   Occupational History    Not on file   Tobacco Use    Smoking status: Not on file    Smokeless tobacco: Former   Substance and Sexual Activity    Alcohol use: No    Drug use: No    Sexual activity: Not on file   Other Topics Concern    Not on file   Social History Narrative    ** Merged History Encounter **          Social Determinants of Health     Financial Resource Strain: Not on file   Food Insecurity: Not on file   Transportation Needs: Not on file   Physical Activity: Not on file   Stress: Not on file   Social Connections: Not on file   Interpersonal Safety: Not on file   Housing Stability: Not on file            Allergies   Morphine    Today's clinic visit entailed:  Review of the result(s) of each unique test -  echo, BMP, proBNP  Prescription drug management  I spent a total of 40 minutes on the day of the visit.   Time spent by me doing chart review, history and exam, documentation and further activities per the note  Provider  Link to MDM Help Grid     The level of medical decision making during this visit was of high complexity.    The longitudinal plan of care for the diagnosis(es)/condition(s) as documented were addressed during this visit. Due to the added complexity in care, I will continue to support Teresa in the subsequent management and with ongoing continuity of care.      Thank you for allowing me to participate in the care of your patient.      Sincerely,     Pearl Bowen PA-C     [facility] Care  cc:   No referring provider defined for this encounter.

## 2024-03-18 NOTE — PROGRESS NOTES
[facility] [place] [facility] - C.O.R.E. Clinic    BMP, NT pro BNP order faxed to [facility], Attn: FLOR RN (Fax: [phone]) to be drawn on 3/26/24 prior to their 3/28/24 appt w/ Dr. Milligan.  Requested results be faxed back.       BMP, NT pro BNP order faxed to [facility], attn: FLOR RN (Fax: [phone]) to be drawn on 4/16/24 prior to their 4/18/24 appt w/ SONAL Luevano.  Requested results be faxed back.       Future Appointments   Date Time Provider Department Center   3/28/2024 10:45 AM Milton Milligan MD [facility] PSA CLIN   4/18/2024 10:50 AM Lindsay Muñoz PA-C [facility]       Lindsay Watters RN BSN  [facility] [place] [place]  C.O.R.E. Clinic Care Coordinator  03/18/24, 10:58 AM

## 2024-03-20 DIAGNOSIS — I25.10 CORONARY ARTERY DISEASE INVOLVING NATIVE CORONARY ARTERY OF NATIVE HEART WITHOUT ANGINA PECTORIS: ICD-10-CM

## 2024-03-20 DIAGNOSIS — I50.23 ACUTE ON CHRONIC SYSTOLIC HEART FAILURE (H): ICD-10-CM

## 2024-03-20 RX ORDER — FUROSEMIDE 20 MG
20 TABLET ORAL DAILY
Qty: 90 TABLET | Refills: 4 | Status: SHIPPED | OUTPATIENT
Start: 2024-03-20

## 2024-03-20 RX ORDER — METOPROLOL SUCCINATE 25 MG/1
25 TABLET, EXTENDED RELEASE ORAL DAILY
Qty: 90 TABLET | Refills: 4 | Status: SHIPPED | OUTPATIENT
Start: 2024-03-20

## 2024-03-20 NOTE — PROGRESS NOTES
Order from [facility] received requesting BMP/NT pro BNP order to be signed. Pearl Bowen, PAC is out. Spoke to [facility], they are okay with Dr Milligan signing order as he will be seeing pt next.   Verified that HC does not have to fax a new order with Dr Milligan's name on it.  Was asked to write Dr Milligan's name on order.     Faxed updated order to Dr Milligan's nursing team.     Lab draw is scheduled for 3/26.    Future Appointments   Date Time Provider Department Center   3/28/2024 10:45 AM Milton Milligan MD [facility] PSA CLIN   4/18/2024 10:50 AM Wanda, Lindsay Miles PA-C [facility] PSA CLIN         Rebekah Malik, YANDEL, RN 2:29 PM 03/20/24

## 2024-03-20 NOTE — TELEPHONE ENCOUNTER
[facility] - C.O.R.E. Clinic    Received VM on CORE RN line from elisabet Hurtado who is requesting refills for lasix and metoprolol sent to [facility] in [place] on [address].  She only has about 1 week left.     [facility] Cardiology Refill Guideline reviewed.  Medication meets criteria for refill.    Medication Refilled: Metoprolol succinate and furosemide   Last office visit: SONAL Hussein on 3/4/24  Last Labs/EKG: 3/2/24 (care everywhere)  Next office visit:   Future Appointments   Date Time Provider Department Center   3/28/2024 10:45 AM Milton Milligan MD [facility] PSA CLIN   4/18/2024 10:50 AM Wanda, Lindsay Miles PA-C [facility] PSA CLIN     Pharmacy sent to: preferred    Called elisabet Hurtado (CTC on file) and informed her refills have been sent to [facility] pharmacy.       Lindsay Watters RN BSN  C.O.R.E. Clinic Care Coordinator  [facility] [place], [place]  [phone]  03/20/24, 9:03 AM

## 2024-03-26 ENCOUNTER — LAB REQUISITION (OUTPATIENT)
Dept: LAB | Facility: CLINIC | Age: 89
End: 2024-03-26
Payer: MEDICARE

## 2024-03-26 LAB
ANION GAP SERPL CALCULATED.3IONS-SCNC: 11 MMOL/L (ref 7–15)
BUN SERPL-MCNC: 27.3 MG/DL (ref 8–23)
CALCIUM SERPL-MCNC: 8.9 MG/DL (ref 8.2–9.6)
CHLORIDE SERPL-SCNC: 105 MMOL/L (ref 98–107)
CREAT SERPL-MCNC: 1.25 MG/DL (ref 0.51–0.95)
DEPRECATED HCO3 PLAS-SCNC: 28 MMOL/L (ref 22–29)
EGFRCR SERPLBLD CKD-EPI 2021: 40 ML/MIN/1.73M2
GLUCOSE SERPL-MCNC: 80 MG/DL (ref 70–99)
HOLD SPECIMEN: NORMAL
NT-PROBNP SERPL-MCNC: ABNORMAL PG/ML (ref 0–1800)
POTASSIUM SERPL-SCNC: 3.8 MMOL/L (ref 3.4–5.3)
SODIUM SERPL-SCNC: 144 MMOL/L (ref 135–145)

## 2024-03-26 PROCEDURE — 80048 BASIC METABOLIC PNL TOTAL CA: CPT | Performed by: INTERNAL MEDICINE

## 2024-03-26 PROCEDURE — 83880 ASSAY OF NATRIURETIC PEPTIDE: CPT | Performed by: INTERNAL MEDICINE

## 2024-03-28 ENCOUNTER — OFFICE VISIT (OUTPATIENT)
Dept: CARDIOLOGY | Facility: CLINIC | Age: 89
End: 2024-03-28
Payer: MEDICARE

## 2024-03-28 VITALS
WEIGHT: 113.3 LBS | SYSTOLIC BLOOD PRESSURE: 137 MMHG | OXYGEN SATURATION: 96 % | HEART RATE: 67 BPM | HEIGHT: 60 IN | DIASTOLIC BLOOD PRESSURE: 75 MMHG | BODY MASS INDEX: 22.24 KG/M2

## 2024-03-28 DIAGNOSIS — I50.23 ACUTE ON CHRONIC SYSTOLIC HEART FAILURE (H): Primary | ICD-10-CM

## 2024-03-28 PROCEDURE — 99214 OFFICE O/P EST MOD 30 MIN: CPT | Performed by: INTERNAL MEDICINE

## 2024-03-28 NOTE — PROGRESS NOTES
CARDIOLOGY CLINIC CONSULTATION    PRIMARY CARE PHYSICIAN:  Lisa Raymundo    HISTORY OF PRESENT ILLNESS:  The patient is a very pleasant 89-year-old female who was recently admitted to the hospital with acute systolic heart failure.  She presented to the emergency department in February with progressive dyspnea on exertion.  Her electrocardiogram revealed left bundle branch block which is presumably chronic.  Transthoracic echocardiogram demonstrated moderate to severe LV systolic dysfunction with an EF of 30 to 35% as well as moderate to severe aortic stenosis.    She she was diuresed aggressively during her hospitalization and was referred for coronary angiogram which demonstrated moderate nonobstructive coronary disease.  She was discharged on aspirin, statin, Toprol and furosemide total milligrams daily.  She was readmitted to [facility] 2 days after discharge from [facility] with shortness of breath.  She was noted to be quite hypertensive.  She required IV diuresis but was discharged on home furosemide dose.  Amlodipine was added to her regimen.  She was subsequently seen by primary care and her amlodipine was discontinued for relative hypotension and his statin was discontinued given advanced age.    The patient was clinically euvolemic.  She does not endorse significant symptoms at this point.  Her weight has remained stable over the past month.    PAST MEDICAL HISTORY:  History reviewed. No pertinent past medical history.    MEDICATIONS:  Current Outpatient Medications   Medication    acetaminophen (TYLENOL) 500 MG tablet    amoxicillin (AMOXIL) 500 MG capsule    aspirin 81 MG tablet    buPROPion (WELLBUTRIN) 100 MG tablet    citalopram (CELEXA) 20 MG tablet    furosemide (LASIX) 20 MG tablet    gabapentin (NEURONTIN) 100 MG capsule    metoprolol succinate ER (TOPROL XL) 25 MG 24 hr tablet    multivitamin, therapeutic with minerals (MULTI-VITAMIN) TABS    sacubitril-valsartan (ENTRESTO) 24-26 MG per  tablet     No current facility-administered medications for this visit.       SOCIAL HISTORY:  I have reviewed this patient's social history and updated it with pertinent information if needed. Teresa Park  reports that she quit smoking about 72 years ago. Her smoking use included cigarettes. She has quit using smokeless tobacco. She reports current alcohol use. She reports that she does not use drugs.    PHYSICAL EXAM:  Pulse:  [67] 67  BP: (137)/(75) 137/75  SpO2:  [96 %] 96 %  113 lbs 4.8 oz    Constitutional: alert, no distress  Respiratory: Good bilateral air entry  Cardiovascular: Regular rate and rhythm, no murmurs  GI: nondistended  Neuropsychiatric: appropriate affact    ASSESSMENT: Pertinent issues addressed/ reviewed during this cardiology visit  Acute on chronic systolic heart failure  Moderate severe LV systolic dysfunction, EF 30 to 35%  Chronic left bundle branch block  Moderate nonobstructive coronary disease, asymptomatic    RECOMMENDATIONS:  She is clinically euvolemic and appears to be stable symptom wise.  She is currently on lisinopril and a beta-blocker.  Prior notes suggest cost issue with ARNI/SGLT2i, however the patient and family do not recall such issues.  Therefore we will discontinue lisinopril and start her on Entresto.  They will hold lisinopril for the washout period and start Entresto.  We will consider SGLT2i down the line.  Repeat echo in 3 months.  Continue current furosemide dose.  Continue daily weights  Follow-up with core clinic arranged    It was a pleasure seeing this patient in clinic today. Please do not hesitate to contact me with any future questions.     Milton Milligan MD, [facility]  Cardiology - [facility]  March 28, 2024    Review of the result(s) of each unique test - Last echocardiogram, BMP, coronary angiogram     The level of medical decision making during this visit was of moderate complexity.    This note was completed in part using dictation via the Dragon voice  recognition software. Some word and grammatical errors may occur and must be interpreted in the appropriate clinical context.  If there are any questions pertaining to this issue, please contact me for further clarification.

## 2024-03-28 NOTE — LETTER
3/28/2024    MD [facility] [address] [facility] [address]    RE: Teresa BA Park       Dear Colleague,     I had the pleasure of seeing Teresa BA Park in the [facility].  CARDIOLOGY CLINIC CONSULTATION    PRIMARY CARE PHYSICIAN:  Lisa Raymundo    HISTORY OF PRESENT ILLNESS:  The patient is a very pleasant 89-year-old female who was recently admitted to the hospital with acute systolic heart failure.  She presented to the emergency department in February with progressive dyspnea on exertion.  Her electrocardiogram revealed left bundle branch block which is presumably chronic.  Transthoracic echocardiogram demonstrated moderate to severe LV systolic dysfunction with an EF of 30 to 35% as well as moderate to severe aortic stenosis.    She she was diuresed aggressively during her hospitalization and was referred for coronary angiogram which demonstrated moderate nonobstructive coronary disease.  She was discharged on aspirin, statin, Toprol and furosemide total milligrams daily.  She was readmitted to [facility] 2 days after discharge from [facility] with shortness of breath.  She was noted to be quite hypertensive.  She required IV diuresis but was discharged on home furosemide dose.  Amlodipine was added to her regimen.  She was subsequently seen by primary care and her amlodipine was discontinued for relative hypotension and his statin was discontinued given advanced age.    The patient was clinically euvolemic.  She does not endorse significant symptoms at this point.  Her weight has remained stable over the past month.    PAST MEDICAL HISTORY:  History reviewed. No pertinent past medical history.    MEDICATIONS:  Current Outpatient Medications   Medication    acetaminophen (TYLENOL) 500 MG tablet    amoxicillin (AMOXIL) 500 MG capsule    aspirin 81 MG tablet    buPROPion (WELLBUTRIN) 100 MG tablet    citalopram (CELEXA) 20 MG  tablet    furosemide (LASIX) 20 MG tablet    gabapentin (NEURONTIN) 100 MG capsule    metoprolol succinate ER (TOPROL XL) 25 MG 24 hr tablet    multivitamin, therapeutic with minerals (MULTI-VITAMIN) TABS    sacubitril-valsartan (ENTRESTO) 24-26 MG per tablet     No current facility-administered medications for this visit.       SOCIAL HISTORY:  I have reviewed this patient's social history and updated it with pertinent information if needed. Teresa Park  reports that she quit smoking about 72 years ago. Her smoking use included cigarettes. She has quit using smokeless tobacco. She reports current alcohol use. She reports that she does not use drugs.    PHYSICAL EXAM:  Pulse:  [67] 67  BP: (137)/(75) 137/75  SpO2:  [96 %] 96 %  113 lbs 4.8 oz    Constitutional: alert, no distress  Respiratory: Good bilateral air entry  Cardiovascular: Regular rate and rhythm, no murmurs  GI: nondistended  Neuropsychiatric: appropriate affact    ASSESSMENT: Pertinent issues addressed/ reviewed during this cardiology visit  Acute on chronic systolic heart failure  Moderate severe LV systolic dysfunction, EF 30 to 35%  Chronic left bundle branch block  Moderate nonobstructive coronary disease, asymptomatic    RECOMMENDATIONS:  She is clinically euvolemic and appears to be stable symptom wise.  She is currently on lisinopril and a beta-blocker.  Prior notes suggest cost issue with ARNI/SGLT2i, however the patient and family do not recall such issues.  Therefore we will discontinue lisinopril and start her on Entresto.  They will hold lisinopril for the washout period and start Entresto.  We will consider SGLT2i down the line.  Repeat echo in 3 months.  Continue current furosemide dose.  Continue daily weights  Follow-up with core clinic arranged    It was a pleasure seeing this patient in clinic today. Please do not hesitate to contact me with any future questions.     Milton Milligan MD, [facility]  Cardiology - [facility]  March 28,  2024    Review of the result(s) of each unique test - Last echocardiogram, BMP, coronary angiogram     The level of medical decision making during this visit was of moderate complexity.    This note was completed in part using dictation via the Dragon voice recognition software. Some word and grammatical errors may occur and must be interpreted in the appropriate clinical context.  If there are any questions pertaining to this issue, please contact me for further clarification.      Thank you for allowing me to participate in the care of your patient.      Sincerely,     Milton Milligan MD, MD     [facility]  cc:   No referring provider defined for this encounter.

## 2024-04-03 ENCOUNTER — TELEPHONE (OUTPATIENT)
Dept: CARDIOLOGY | Facility: CLINIC | Age: 89
End: 2024-04-03
Payer: MEDICARE

## 2024-04-03 NOTE — TELEPHONE ENCOUNTER
[facility] Call Center    Phone Message    May a detailed message be left on voicemail: yes     Reason for Call: Other: Natasha with [facility] called to check on status of written one page order that needs to be signed and faxed back to them. Natasha stated this order was faxed in on 03/20 and 03/27. Please review and signed orders and fax back to [phone]. If any questions, please call Natasha back at [phone] ext [phone].     Action Taken: Other: Cardiology    Travel Screening: Not Applicable    Thank you!  [facility]

## 2024-04-03 NOTE — TELEPHONE ENCOUNTER
[facility] - C.O.R.E. Clinic    Called [facility] RN Natasha back ([phone], ex [phone]).   left requesting call back to clarify as to which orders are needed to be signed.      Reviewed records and BMP, NT pro BNP signed orders by Pearl Bowen were already faxed on 3/18/24 for labs to be drawn on both 3/26 and 4/16.      No further documentation found with other faxes recently sent.     Future Appointments   Date Time Provider Department Center   4/18/2024 10:50 AM More, MEHDI [facility] PSA CLIN         Lindsay Watters, RN BSN   [facility]- [place], [place]  C.O.R.E. Clinic Care Coordinator  04/03/24, 10:36 AM

## 2024-04-08 NOTE — TELEPHONE ENCOUNTER
"[facility] - C.O.R.E. Clinic    Received another fax request from [facility] requesting BMP, BNP orders be signed and dated and faxed back.     Re-faxed (fax: [phone]) original signed and dated orders with the following note on cover sheet: \"see original.  It already was signed and dated.  Labs ordered for 3/26 and 4/16/24.\"        Lindsay Watters RN BSN   [place], [place]  C.O.R.E. Clinic Care Coordinator  04/08/24, 12:53 PM      "

## 2024-04-15 ENCOUNTER — LAB REQUISITION (OUTPATIENT)
Dept: LAB | Facility: CLINIC | Age: 89
End: 2024-04-15
Payer: MEDICARE

## 2024-04-15 DIAGNOSIS — I50.23 ACUTE ON CHRONIC SYSTOLIC (CONGESTIVE) HEART FAILURE (H): ICD-10-CM

## 2024-04-15 LAB
ANION GAP SERPL CALCULATED.3IONS-SCNC: 14 MMOL/L (ref 7–15)
BUN SERPL-MCNC: 21.4 MG/DL (ref 8–23)
CALCIUM SERPL-MCNC: 8.5 MG/DL (ref 8.2–9.6)
CHLORIDE SERPL-SCNC: 108 MMOL/L (ref 98–107)
CREAT SERPL-MCNC: 1.16 MG/DL (ref 0.51–0.95)
DEPRECATED HCO3 PLAS-SCNC: 24 MMOL/L (ref 22–29)
EGFRCR SERPLBLD CKD-EPI 2021: 44 ML/MIN/1.73M2
GLUCOSE SERPL-MCNC: 121 MG/DL (ref 70–99)
HOLD SPECIMEN: NORMAL
NT-PROBNP SERPL-MCNC: ABNORMAL PG/ML (ref 0–1800)
POTASSIUM SERPL-SCNC: 3.5 MMOL/L (ref 3.4–5.3)
SODIUM SERPL-SCNC: 146 MMOL/L (ref 135–145)

## 2024-04-15 PROCEDURE — 83880 ASSAY OF NATRIURETIC PEPTIDE: CPT | Mod: ORL | Performed by: PHYSICIAN ASSISTANT

## 2024-04-15 PROCEDURE — 80048 BASIC METABOLIC PNL TOTAL CA: CPT | Mod: ORL | Performed by: PHYSICIAN ASSISTANT

## 2024-04-18 ENCOUNTER — OFFICE VISIT (OUTPATIENT)
Dept: CARDIOLOGY | Facility: CLINIC | Age: 89
End: 2024-04-18
Payer: MEDICARE

## 2024-04-18 ENCOUNTER — HOSPITAL ENCOUNTER (OUTPATIENT)
Dept: GENERAL RADIOLOGY | Facility: CLINIC | Age: 89
Discharge: HOME | End: 2024-04-18
Attending: PHYSICIAN ASSISTANT | Admitting: PHYSICIAN ASSISTANT
Payer: MEDICARE

## 2024-04-18 VITALS
DIASTOLIC BLOOD PRESSURE: 72 MMHG | WEIGHT: 117.2 LBS | HEIGHT: 60 IN | HEART RATE: 70 BPM | SYSTOLIC BLOOD PRESSURE: 149 MMHG | OXYGEN SATURATION: 98 % | BODY MASS INDEX: 23.01 KG/M2

## 2024-04-18 DIAGNOSIS — I50.23 ACUTE ON CHRONIC SYSTOLIC HEART FAILURE (H): ICD-10-CM

## 2024-04-18 PROCEDURE — 71046 X-RAY EXAM CHEST 2 VIEWS: CPT

## 2024-04-18 PROCEDURE — 99215 OFFICE O/P EST HI 40 MIN: CPT | Performed by: PHYSICIAN ASSISTANT

## 2024-04-18 NOTE — RESULT ENCOUNTER NOTE
Will review or did review during clinic visit.  Please see progress note for plan.  Lindsay Muñoz PA-C 4/18/2024 2:49 PM

## 2024-04-18 NOTE — PATIENT INSTRUCTIONS
Call CORE nurse for any questions or concerns Mon-Fri 8am-4pm:                                                #[phone]                                       For concerns after hours:                                               #[phone]     1: Medication changes: Increase Entresto to 1 and 1/2 pills at night, continue on one pill in the morning. After 2 weeks as long as you feel well, increase to 1 and 1/2 pills twice a day.      2: Plan from today: chest xray today.    We'll see you back in about 6-8 weeks with labs at that visit.      3: Lab results: see attached  Component      Latest Ref Rng 4/15/2024  1:15 PM   Sodium      135 - 145 mmol/L 146 (H)    Potassium      3.4 - 5.3 mmol/L 3.5    Chloride      98 - 107 mmol/L 108 (H)    Carbon Dioxide (CO2)      22 - 29 mmol/L 24    Anion Gap      7 - 15 mmol/L 14    Urea Nitrogen      8.0 - 23.0 mg/dL 21.4    Creatinine      0.51 - 0.95 mg/dL 1.16 (H)    GFR Estimate      >60 mL/min/1.73m2 44 (L)    Calcium      8.2 - 9.6 mg/dL 8.5    Glucose      70 - 99 mg/dL 121 (H)    N-Terminal Pro Bnp      0 - 1,800 pg/mL 22,530 (H)       Legend:  (H) High  (L) Low

## 2024-04-18 NOTE — LETTER
2024    Tara Ann Nelson, MD [facility] [address] [facility] [address]    RE: Teresa Park       Dear Colleague,     I had the pleasure of seeing Teresa Park in the [facility].    Cardiology Clinic Progress Note    Service Date: 2024    Primary Cardiologist: Dr. Milligan      Reason for Visit: CORE clinic f/u     HPI:   Teresa Park is a delightful 89-year-old woman with past medical history significant for the followin.  Nonischemic cardiomyopathy EF 30 to 35%  2.  Moderate-severe aortic stenosis  3.  Hypertension  4.  Hyperlipidemia  5.  Left bundle branch block  6.  Nonocclusive coronary artery disease with angiogram  showing up to a 50% proximal LAD lesion with other vessels having 55% mid circumflex, 45% D1, 40% proximal to mid RCA.    Is my great pleasure to meet Teresa for the first time today.  She is unfortunately had 2 hospitalizations this spring, the first at [facility]  through  for new diagnosis heart failure with respiratory failure.  She underwent her angiogram and was diuresed requiring a diuretic holiday due to worsening renal function.  Unfortunately She presented to the ER at [facility] 2024 with acute shortness of breath.  She had negative PE study but ongoing CHF.  Does not appear that medication adjustments were made.  She then had near syncopal episode and at her primary care provider her amlodipine was discontinued.    She is here today for follow-up of these events.  Today she states she feels okay.  She is with her daughter who feels like she is doing relatively well.  She can make her bed and she can do things around the house she can do her laundry, get dressed, and get ready without she has significant shortness of breath.  She continues to be occasionally lightheaded and had some lower blood pressures even off the amlodipine in the 80s that she was symptomatic with.  Otherwise home  blood pressures have been in the 140s and 150s.  She is getting home PT and doing well with that.  Her weight has trended up and was 110 when she left the hospital and now is gradually up to 118 pounds.  She notes that she never had peripheral edema even when she had volume overload that it seems like the fluid went directly to her lungs.    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  She is  from her  of over 60 years.  They have 6 children, 16 grandchildren and I believe something like 11 great-grandchildren.  She comes in today with her daughter who is very supportive, Genesis.  Her daughter is a retired professor of immunology from [employer].    Physical Exam:  BP (!) 149/72   Pulse 70   Ht 1.524 m (5')   Wt 53.2 kg (117 lb 3.2 oz)   SpO2 98%   BMI 22.89 kg/m     Well-developed well-nourished woman who appears younger than her stated age.  Normocephalic atraumatic.  Heart is regular with 3 out of 6 systolic murmur heard best at the right sternal border.  Lungs are diminished bilaterally but without wheezes rales or rhonchi.  Extremities without peripheral edema.  I do not appreciate JVP at 30 degrees.  Skin is warm and dry.    Data reviewed:  BMP, BNP, last echocardiogram, last hospitalization.    Assessment and Plan:  1.  Heart failure with reduced ejection fraction, nonischemic cardiomyopathy, with underlying cause unclear.  She does have a left bundle branch block so that certainly possibility, additionally in her age group could consider amyloidosis, less likely valvular as her valve seems to be in the moderate range.  Today we discussed goals of care briefly.  She is a very healthy 89-year old and quite independent we want her to be as well as possible.  Our goal, will be reverse remodeling.  Her volume status is difficult to discern, her weight is up about 8 pounds at home but symptomatically she is about class II stage C heart failure.  Given her lungs are  diminished I am going to repeat a chest x-ray today and then determine if she requires additional diuretic therapy.  For GDMT, we will very slowly uptitrate her Entresto to 36/38 mg at night and remain on 24/20 6 in the morning.  After 2 weeks as long as she feels well she will go up to a pill and a half twice daily.  We also discussed possible Jardiance and spironolactone with this patient will slowly consider adding those down the road.    2.  Left bundle branch block    3.  Moderate to severe aortic stenosis, mean gradient was 30 peak velocity 3.6 m/s and peak gradient of 53.  Will continue to follow this closely.    4.  Hypertension with orthostatic hypotension left be very careful to balance this.  I have asked him to pay close attention to any worsening presyncope with these medication changes.    5.  Nonocclusive coronary artery disease, appropriately on aspirin will not start statin as if this is a 10-year benefit and at the age of 89 with this minimal disease there is not a strong indication.    It is my great pleasure to participate in this delightful patient's care.  Will see what her chest x-ray shows and then make adjustments based on that.  Will see her back in about 6 to 8 weeks with a BMP, TSH, BNP before that visit.  She will call sooner with concerns  Lindsay Muñoz PA-C  [facility]     This note was written using Dragon voice recognition system, please excuse any misspelled names, or nonsensical words and call with any questions.      50 total minutes was spent today including chart review, precharting, history and exam, post visit documentation, and reviewing studies as outlined above.   Orders this visit:  Orders Placed This Encounter   Procedures    X-ray Chest 2 vws*    Basic metabolic panel    Hemoglobin    N terminal pro BNP outpatient    TSH with free T4 reflex    Follow-Up with Cardiology [facility]     Orders Placed This Encounter   Medications    sacubitril-valsartan  (ENTRESTO) 24-26 MG per tablet     Sig: Take 1 and 1/2 pills twice a day.     Dispense:  135 tablet     Refill:  11     Medications Discontinued During This Encounter   Medication Reason    sacubitril-valsartan (ENTRESTO) 24-26 MG per tablet      Encounter Diagnosis   Name Primary?    Acute on chronic systolic heart failure (H)        Current Medications:  Current Outpatient Medications   Medication Sig Dispense Refill    acetaminophen (TYLENOL) 500 MG tablet Take 500-1,000 mg by mouth every 6 hours as needed for mild pain      amoxicillin (AMOXIL) 500 MG capsule Take 500 mg by mouth Prior to dental appointments      aspirin 81 MG tablet Take by mouth daily      buPROPion (WELLBUTRIN) 100 MG tablet Take 100 mg by mouth 2 times daily      citalopram (CELEXA) 20 MG tablet Take 20 mg by mouth every morning      furosemide (LASIX) 20 MG tablet Take 1 tablet (20 mg) by mouth daily May take an extra dose for shortness of breath. If no improvement with an extra dose, CALL CORE clinic. 90 tablet 4    gabapentin (NEURONTIN) 100 MG capsule Take 100 mg by mouth nightly as needed for other (pain, insomnia)      metoprolol succinate ER (TOPROL XL) 25 MG 24 hr tablet Take 1 tablet (25 mg) by mouth daily 90 tablet 4    multivitamin, therapeutic with minerals (MULTI-VITAMIN) TABS Take 1 tablet by mouth daily      sacubitril-valsartan (ENTRESTO) 24-26 MG per tablet Take 1 and 1/2 pills twice a day. 135 tablet 11       Allergies Reviewed and Updated:  Allergies   Allergen Reactions    Morphine Nausea       Review of Systems:  Skin:  Negative     Eyes:  Positive for glasses  ENT:  not assessed    Respiratory:  Positive for dyspnea on exertion  Cardiovascular:  Negative;palpitations;edema;chest pain Positive for;lightheadedness  Gastroenterology: not assessed    Genitourinary:  not assessed    Musculoskeletal:  not assessed    Neurologic:       Psychiatric:  Negative    Heme/Lymph/Imm:  Negative    Endocrine:  Negative       Pertinent  Past Medical, Surgical and Family History Reviewed and noted above.     CC  Pearl Bowen PA-C  [address]  [place] [address]    Thank you for allowing me to participate in the care of your patient.      Sincerely,     Lindsay Muñoz PA-C     [facility]

## 2024-04-18 NOTE — PROGRESS NOTES
Cardiology Clinic Progress Note    Service Date: 2024    Primary Cardiologist: Dr. Milligan      Reason for Visit: [facility] clinic f/u     HPI:   Teresa Park is a delightful 89-year-old woman with past medical history significant for the followin.  Nonischemic cardiomyopathy EF 30 to 35%  2.  Moderate-severe aortic stenosis  3.  Hypertension  4.  Hyperlipidemia  5.  Left bundle branch block  6.  Nonocclusive coronary artery disease with angiogram  showing up to a 50% proximal LAD lesion with other vessels having 55% mid circumflex, 45% D1, 40% proximal to mid RCA.    Is my great pleasure to meet Teresa for the first time today.  She is unfortunately had 2 hospitalizations this spring, the first at [facility]  through  for new diagnosis heart failure with respiratory failure.  She underwent her angiogram and was diuresed requiring a diuretic holiday due to worsening renal function.  Unfortunately She presented to the ER at [facility] 2024 with acute shortness of breath.  She had negative PE study but ongoing CHF.  Does not appear that medication adjustments were made.  She then had near syncopal episode and at her primary care provider her amlodipine was discontinued.    She is here today for follow-up of these events.  Today she states she feels okay.  She is with her daughter who feels like she is doing relatively well.  She can make her bed and she can do things around the house she can do her laundry, get dressed, and get ready without she has significant shortness of breath.  She continues to be occasionally lightheaded and had some lower blood pressures even off the amlodipine in the 80s that she was symptomatic with.  Otherwise home blood pressures have been in the 140s and 150s.  She is getting home PT and doing well with that.  Her weight has trended up and was 110 when she left the hospital and now is gradually up to 118 pounds.  She notes that she never had peripheral edema  even when she had volume overload that it seems like the fluid went directly to her lungs.    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  She is  from her  of over 60 years.  They have 6 children, 16 grandchildren and I believe something like 11 great-grandchildren.  She comes in today with her daughter who is very supportive, Genesis.  Her daughter is a retired professor of immunology from [place].    Physical Exam:  BP (!) 149/72   Pulse 70   Ht 1.524 m (5')   Wt 53.2 kg (117 lb 3.2 oz)   SpO2 98%   BMI 22.89 kg/m     Well-developed well-nourished woman who appears younger than her stated age.  Normocephalic atraumatic.  Heart is regular with 3 out of 6 systolic murmur heard best at the right sternal border.  Lungs are diminished bilaterally but without wheezes rales or rhonchi.  Extremities without peripheral edema.  I do not appreciate JVP at 30 degrees.  Skin is warm and dry.    Data reviewed:  BMP, BNP, last echocardiogram, last hospitalization.    Assessment and Plan:  1.  Heart failure with reduced ejection fraction, nonischemic cardiomyopathy, with underlying cause unclear.  She does have a left bundle branch block so that certainly possibility, additionally in her age group could consider amyloidosis, less likely valvular as her valve seems to be in the moderate range.  Today we discussed goals of care briefly.  She is a very healthy 89-year old and quite independent we want her to be as well as possible.  Our goal, will be reverse remodeling.  Her volume status is difficult to discern, her weight is up about 8 pounds at home but symptomatically she is about class II stage C heart failure.  Given her lungs are diminished I am going to repeat a chest x-ray today and then determine if she requires additional diuretic therapy.  For GDMT, we will very slowly uptitrate her Entresto to 36/38 mg at night and remain on 24/20 6 in the morning.  After 2 weeks as long  as she feels well she will go up to a pill and a half twice daily.  We also discussed possible Jardiance and spironolactone with this patient will slowly consider adding those down the road.    2.  Left bundle branch block    3.  Moderate to severe aortic stenosis, mean gradient was 30 peak velocity 3.6 m/s and peak gradient of 53.  Will continue to follow this closely.    4.  Hypertension with orthostatic hypotension left be very careful to balance this.  I have asked him to pay close attention to any worsening presyncope with these medication changes.    5.  Nonocclusive coronary artery disease, appropriately on aspirin will not start statin as if this is a 10-year benefit and at the age of 89 with this minimal disease there is not a strong indication.    It is my great pleasure to participate in this delightful patient's care.  Will see what her chest x-ray shows and then make adjustments based on that.  Will see her back in about 6 to 8 weeks with a BMP, TSH, BNP before that visit.  She will call sooner with concerns  Lindsay Muñoz PA-C  [facility] Card[facility]     This note was written using Dragon voice recognition system, please excuse any misspelled names, or nonsensical words and call with any questions.      50 total minutes was spent today including chart review, precharting, history and exam, post visit documentation, and reviewing studies as outlined above.   Orders this visit:  Orders Placed This Encounter   Procedures    X-ray Chest 2 vws*    Basic metabolic panel    Hemoglobin    N terminal pro BNP outpatient    TSH with free T4 reflex    Follow-Up with Cardiology DENYS [facility]     Orders Placed This Encounter   Medications    sacubitril-valsartan (ENTRESTO) 24-26 MG per tablet     Sig: Take 1 and 1/2 pills twice a day.     Dispense:  135 tablet     Refill:  11     Medications Discontinued During This Encounter   Medication Reason    sacubitril-valsartan (ENTRESTO) 24-26 MG per tablet      Encounter  Diagnosis   Name Primary?    Acute on chronic systolic heart failure (H)        Current Medications:  Current Outpatient Medications   Medication Sig Dispense Refill    acetaminophen (TYLENOL) 500 MG tablet Take 500-1,000 mg by mouth every 6 hours as needed for mild pain      amoxicillin (AMOXIL) 500 MG capsule Take 500 mg by mouth Prior to dental appointments      aspirin 81 MG tablet Take by mouth daily      buPROPion (WELLBUTRIN) 100 MG tablet Take 100 mg by mouth 2 times daily      citalopram (CELEXA) 20 MG tablet Take 20 mg by mouth every morning      furosemide (LASIX) 20 MG tablet Take 1 tablet (20 mg) by mouth daily May take an extra dose for shortness of breath. If no improvement with an extra dose, CALL CORE clinic. 90 tablet 4    gabapentin (NEURONTIN) 100 MG capsule Take 100 mg by mouth nightly as needed for other (pain, insomnia)      metoprolol succinate ER (TOPROL XL) 25 MG 24 hr tablet Take 1 tablet (25 mg) by mouth daily 90 tablet 4    multivitamin, therapeutic with minerals (MULTI-VITAMIN) TABS Take 1 tablet by mouth daily      sacubitril-valsartan (ENTRESTO) 24-26 MG per tablet Take 1 and 1/2 pills twice a day. 135 tablet 11       Allergies Reviewed and Updated:  Allergies   Allergen Reactions    Morphine Nausea       Review of Systems:  Skin:  Negative     Eyes:  Positive for glasses  ENT:  not assessed    Respiratory:  Positive for dyspnea on exertion  Cardiovascular:  Negative;palpitations;edema;chest pain Positive for;lightheadedness  Gastroenterology: not assessed    Genitourinary:  not assessed    Musculoskeletal:  not assessed    Neurologic:       Psychiatric:  Negative    Heme/Lymph/Imm:  Negative    Endocrine:  Negative       Pertinent Past Medical, Surgical and Family History Reviewed and noted above.     EVELYN Bowen PA-C  [id number]  [place] [id number]

## 2024-04-21 ENCOUNTER — DOCUMENTATION ONLY (OUTPATIENT)
Dept: CARDIOLOGY | Facility: CLINIC | Age: 89
End: 2024-04-21
Payer: MEDICARE

## 2024-04-21 NOTE — PROGRESS NOTES
I spoke with Genesis, daughter of Teresa Park, about dyspnea. She has a history of the followin.  Nonischemic cardiomyopathy EF 30 to 35%  2.  Moderate-severe aortic stenosis  3.  Hypertension  4.  Hyperlipidemia  5.  Left bundle branch block  6.  Nonocclusive coronary artery disease with angiogram  showing up to a 50% proximal LAD lesion with other vessels having 55% mid circumflex, 45% D1, 40% proximal to mid RCA.     Genesis states her mother Teresa has been having increased dyspnea today ambulating around their house. She takes Furosemide 20 mg daily typically. They took an additional 20 mg tablet at 4:42 pm.    I told Genesis I would send a message to her care team, but in the meantime for today if her dyspnea recurs or does not improve within the next 2-4 hours to take an additional 20 mg tablet of Furosemide, but otherwise to limit fluid intake to thirst, and avoid salty foods and red meat for the night.

## 2024-05-06 ENCOUNTER — LAB REQUISITION (OUTPATIENT)
Dept: LAB | Facility: CLINIC | Age: 89
End: 2024-05-06

## 2024-05-06 DIAGNOSIS — Z11.1 ENCOUNTER FOR SCREENING FOR RESPIRATORY TUBERCULOSIS: ICD-10-CM

## 2024-05-07 ENCOUNTER — TRANSITIONAL CARE UNIT VISIT (OUTPATIENT)
Dept: GERIATRICS | Facility: CLINIC | Age: 89
End: 2024-05-07
Payer: MEDICARE

## 2024-05-07 VITALS
SYSTOLIC BLOOD PRESSURE: 110 MMHG | HEIGHT: 62 IN | BODY MASS INDEX: 21.44 KG/M2 | HEART RATE: 78 BPM | RESPIRATION RATE: 18 BRPM | DIASTOLIC BLOOD PRESSURE: 58 MMHG | OXYGEN SATURATION: 92 % | TEMPERATURE: 97.8 F

## 2024-05-07 DIAGNOSIS — I50.23 ACUTE ON CHRONIC SYSTOLIC CONGESTIVE HEART FAILURE (H): ICD-10-CM

## 2024-05-07 DIAGNOSIS — I10 HYPERTENSION, UNSPECIFIED TYPE: ICD-10-CM

## 2024-05-07 DIAGNOSIS — U07.1 INFECTION DUE TO 2019 NOVEL CORONAVIRUS: ICD-10-CM

## 2024-05-07 DIAGNOSIS — Z71.89 ADVANCED DIRECTIVES, COUNSELING/DISCUSSION: ICD-10-CM

## 2024-05-07 DIAGNOSIS — J96.02 ACUTE RESPIRATORY FAILURE WITH HYPOXIA AND HYPERCAPNIA (H): Primary | ICD-10-CM

## 2024-05-07 DIAGNOSIS — I42.8 NONISCHEMIC CARDIOMYOPATHY (H): ICD-10-CM

## 2024-05-07 DIAGNOSIS — Z95.2 S/P TAVR (TRANSCATHETER AORTIC VALVE REPLACEMENT): ICD-10-CM

## 2024-05-07 DIAGNOSIS — F33.9 RECURRENT MAJOR DEPRESSIVE DISORDER, REMISSION STATUS UNSPECIFIED (H): ICD-10-CM

## 2024-05-07 DIAGNOSIS — I35.0 SEVERE AORTIC STENOSIS: ICD-10-CM

## 2024-05-07 DIAGNOSIS — D62 ABLA (ACUTE BLOOD LOSS ANEMIA): ICD-10-CM

## 2024-05-07 DIAGNOSIS — N18.32 STAGE 3B CHRONIC KIDNEY DISEASE (H): ICD-10-CM

## 2024-05-07 DIAGNOSIS — J96.01 ACUTE RESPIRATORY FAILURE WITH HYPOXIA AND HYPERCAPNIA (H): Primary | ICD-10-CM

## 2024-05-07 DIAGNOSIS — R53.81 PHYSICAL DECONDITIONING: ICD-10-CM

## 2024-05-07 PROCEDURE — P9604 ONE-WAY ALLOW PRORATED TRIP: HCPCS | Performed by: NURSE PRACTITIONER

## 2024-05-07 PROCEDURE — 86481 TB AG RESPONSE T-CELL SUSP: CPT | Performed by: NURSE PRACTITIONER

## 2024-05-07 PROCEDURE — 36415 COLL VENOUS BLD VENIPUNCTURE: CPT | Performed by: NURSE PRACTITIONER

## 2024-05-07 PROCEDURE — 99309 SBSQ NF CARE MODERATE MDM 30: CPT | Performed by: NURSE PRACTITIONER

## 2024-05-07 NOTE — PROGRESS NOTES
[facility] GERIATRICS    PRIMARY CARE PROVIDER AND CLINIC:  Lisa Raymundo MD, [address] [address] [facility] [address] / [facility]  Chief Complaint   Patient presents with    Hospital F/U      [place] Medical Record Number:  [id number]  Place of Service where encounter took place:  [facility] (TCU) [[id number]]    Teresa Park  is a 89 year old  (11/21/1930), admitted to the above facility from  [facility] . Hospital stay 4/30/24 through 5/6/24..   HPI:    89 y.o. female PMH severe AS, CAD, chronic systolic CHF, EF 35-40%, HLD, HTN and thoracic aortic aneurysm hospitalized in March with pulmonary edema and severe AS and diuresed, readmitted 4/30 with acute on chronic systolic CHF, acute hypoxic respiratory failure, severe aortic stenosis, HTN, nonischemic cardiomyopathy recent EF 35;40%, low K. BNP on admission 11, 826. Treated with BiPAP, IV lasix. Cardiology: now s/p TAVR on 5/3. On lasix, asa and Plavix x 6 months then asa alone. Had elevated troponin due to demand ischemia. Tested positive for COVID 19, now off steroids. Depression stable. Anemia due to CKD stable. To TCU for rehab.     Patient is seen for initial TCU visit, history obtained from patient, staff and extensive review of the chart.  Reports dyspnea with exertion. No pain. Denies dizziness, chest pain, dyspnea, bowel or bladder issues. DNR/DNI per POLST. BP range 105-132/56-62 and sats 94% room air. Walked 45 ft with 4WW.       CODE STATUS/ADVANCE DIRECTIVES DISCUSSION:  Prior  DNR / DNI  ALLERGIES:   Allergies   Allergen Reactions    Dextromethorphan     Doxylamine     Morphine Nausea      PAST MEDICAL HISTORY: No past medical history on file.   PAST SURGICAL HISTORY:   has a past surgical history that includes Coronary Angiogram (N/A, 2/20/2024); Left Heart Catheterization (N/A, 2/20/2024); and Instantaneous Wave-Free Ratio (N/A, 2/20/2024).  FAMILY HISTORY: family history is not on file.  SOCIAL HISTORY:  "  reports that she quit smoking about 72 years ago. Her smoking use included cigarettes. She has quit using smokeless tobacco. She reports current alcohol use. She reports that she does not use drugs.  Patient's living condition: lives alone    Post Discharge Medication Reconciliation Status:   MED REC REQUIRED  Post Medication Reconciliation Status: discharge medications reconciled, continue medications without change       Current Outpatient Medications   Medication Sig Dispense Refill    aspirin 81 MG tablet Take by mouth daily      buPROPion (WELLBUTRIN) 100 MG tablet Take 100 mg by mouth 2 times daily      citalopram (CELEXA) 20 MG tablet Take 20 mg by mouth every morning      furosemide (LASIX) 20 MG tablet Take 1 tablet (20 mg) by mouth daily May take an extra dose for shortness of breath. If no improvement with an extra dose, CALL CORE clinic. 90 tablet 4    gabapentin (NEURONTIN) 100 MG capsule Take 100 mg by mouth nightly as needed for other (pain, insomnia)      metoprolol succinate ER (TOPROL XL) 25 MG 24 hr tablet Take 1 tablet (25 mg) by mouth daily 90 tablet 4    sacubitril-valsartan (ENTRESTO) 49-51 MG per tablet Take 1 tablet by mouth 2 times daily       No current facility-administered medications for this visit.       ROS:  10 point ROS of systems including Constitutional, Eyes, Respiratory, Cardiovascular, Gastroenterology, Genitourinary, Integumentary, Musculoskeletal, Psychiatric were all negative except for pertinent positives noted in my HPI.    Vitals:  /58   Pulse 78   Temp 97.8  F (36.6  C)   Resp 18   Ht 1.575 m (5' 2\")   SpO2 92%   BMI 21.44 kg/m    Exam:  GENERAL APPEARANCE:  Alert, in no distress, pleasant, cooperative, oriented x self, place, recent events  EYES:  EOM, lids, pupils and irises normal, sclera clear and conjunctiva normal, no discharge or mattering on lids or lashes noted  ENT:  Mouth normal, moist mucous membranes, nose normal without drainage or crusting, " external ears without lesions, hearing acuity impaired  NECK: supple, symmetrical, trachea midline  RESP:  respiratory effort normal, no chest wall tenderness, no respiratory distress, Lung sounds clear, patient is on room air  CV:  Auscultation of heart done, rate and rhythm controlled and regular, no murmur, no rub or gallop. Edema none bilateral lower extremities  ABDOMEN:  normal bowel sounds, soft, nontender, no palpable masses.  M/S:   Gait and station walks with assist , no tenderness or swelling of the joints; able to move all extremities, digits normal  NEURO: cranial nerves 2-12 grossly intact, no facial asymmetry, no speech deficits and able to follow directions, moves all extremities symmetrically  PSYCH:  insight and judgement and memory appear impaired, affect and mood normal     Lab/Diagnostic data:  5/6/24 K 3.5, Na 136, BUN 21, Cr 1.52, Hgb 10.4, , WBC 7.8    ASSESSMENT/PLAN:  Acute respiratory failure with hypoxia and hypercapnia (H)  Acute on chronic systolic congestive heart failure (H)  Severe aortic stenosis  S/P TAVR (transcatheter aortic valve replacement)  Hypertension, unspecified type  Nonischemic cardiomyopathy (H)  Acute on chronic. Continue asa 81 mg daily, lasix 0 mg daily, Toprol XL 25 mg daily, entresto 49-51 mg tabs take 1 BID. Monitor vs and wt. Follow-up cardiology as recommended.     Infection due to 2019 novel coronavirus  Asymptomatic. Monitor respiratory status. Isolation through 5/10.     ABLA (acute blood loss anemia)  Acute with illness. Hgb 10.4 last check. Check CBC on 5/9    Stage 3b chronic kidney disease (H)  Chronic. Cr 1.52. check BMP on 5/9, avoid nephrotoxins.     Recurrent major depressive disorder, remission status unspecified (H24)  Continue PTA Celexa 20 mg daily, Neurontin 100 mg HS PRN insomnia, Wellbutrin 100 mg BID, monitor mood.     Physical deconditioning  Acute on chronic. Therapies eval and treat, follow-up progress.     Advanced directives,  counseling/discussion  Asks to be DNR/DNI    Orders:  DNR/DNI  CBC and BMP on 5/9 diagnosis anemia, CKD    Electronically signed by:  MAITE Caro CNP

## 2024-05-08 ENCOUNTER — LAB REQUISITION (OUTPATIENT)
Dept: LAB | Facility: CLINIC | Age: 89
End: 2024-05-08

## 2024-05-08 ENCOUNTER — DOCUMENTATION ONLY (OUTPATIENT)
Dept: GERIATRICS | Facility: CLINIC | Age: 89
End: 2024-05-08

## 2024-05-08 ENCOUNTER — TRANSITIONAL CARE UNIT VISIT (OUTPATIENT)
Dept: GERIATRICS | Facility: CLINIC | Age: 89
End: 2024-05-08
Payer: MEDICARE

## 2024-05-08 ENCOUNTER — DOCUMENTATION ONLY (OUTPATIENT)
Dept: OTHER | Facility: CLINIC | Age: 89
End: 2024-05-08
Payer: MEDICARE

## 2024-05-08 VITALS
TEMPERATURE: 98.1 F | SYSTOLIC BLOOD PRESSURE: 123 MMHG | HEIGHT: 62 IN | BODY MASS INDEX: 20.09 KG/M2 | DIASTOLIC BLOOD PRESSURE: 66 MMHG | RESPIRATION RATE: 18 BRPM | HEART RATE: 57 BPM | WEIGHT: 109.2 LBS | OXYGEN SATURATION: 96 %

## 2024-05-08 DIAGNOSIS — R41.0 CONFUSION: Primary | ICD-10-CM

## 2024-05-08 DIAGNOSIS — N18.32 STAGE 3B CHRONIC KIDNEY DISEASE (H): ICD-10-CM

## 2024-05-08 DIAGNOSIS — J96.02 ACUTE RESPIRATORY FAILURE WITH HYPOXIA AND HYPERCAPNIA (H): ICD-10-CM

## 2024-05-08 DIAGNOSIS — Z95.2 S/P TAVR (TRANSCATHETER AORTIC VALVE REPLACEMENT): ICD-10-CM

## 2024-05-08 DIAGNOSIS — I35.0 SEVERE AORTIC STENOSIS: ICD-10-CM

## 2024-05-08 DIAGNOSIS — I42.8 NONISCHEMIC CARDIOMYOPATHY (H): ICD-10-CM

## 2024-05-08 DIAGNOSIS — I10 HYPERTENSION, UNSPECIFIED TYPE: ICD-10-CM

## 2024-05-08 DIAGNOSIS — N18.9 CHRONIC KIDNEY DISEASE, UNSPECIFIED: ICD-10-CM
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
## 2024-05-08 DIAGNOSIS — R53.1 WEAKNESS: ICD-10-CM

## 2024-05-08 DIAGNOSIS — D62 ABLA (ACUTE BLOOD LOSS ANEMIA): ICD-10-CM

## 2024-05-08 DIAGNOSIS — U07.1 INFECTION DUE TO 2019 NOVEL CORONAVIRUS: ICD-10-CM

## 2024-05-08 DIAGNOSIS — I50.23 ACUTE ON CHRONIC SYSTOLIC CONGESTIVE HEART FAILURE (H): ICD-10-CM

## 2024-05-08 DIAGNOSIS — R53.81 PHYSICAL DECONDITIONING: ICD-10-CM

## 2024-05-08 DIAGNOSIS — F33.9 RECURRENT MAJOR DEPRESSIVE DISORDER, REMISSION STATUS UNSPECIFIED (H): ICD-10-CM

## 2024-05-08 DIAGNOSIS — J96.01 ACUTE RESPIRATORY FAILURE WITH HYPOXIA AND HYPERCAPNIA (H): ICD-10-CM

## 2024-05-08 LAB
ALBUMIN UR-MCNC: 50 MG/DL
APPEARANCE UR: CLEAR
BILIRUB UR QL STRIP: NEGATIVE
COLOR UR AUTO: YELLOW
GLUCOSE UR STRIP-MCNC: NEGATIVE MG/DL
HGB UR QL STRIP: NEGATIVE
HYALINE CASTS: 3 /LPF
KETONES UR STRIP-MCNC: NEGATIVE MG/DL
LEUKOCYTE ESTERASE UR QL STRIP: NEGATIVE
MUCOUS THREADS #/AREA URNS LPF: PRESENT /LPF
NITRATE UR QL: NEGATIVE
PH UR STRIP: 6.5 [PH] (ref 5–7)
QUANTIFERON MITOGEN: 1.15 IU/ML
QUANTIFERON NIL TUBE: 0.01 IU/ML
QUANTIFERON TB1 TUBE: 0.01 IU/ML
QUANTIFERON TB2 TUBE: 0.01
RBC URINE: 2 /HPF
SP GR UR STRIP: 1.02 (ref 1–1.03)
SQUAMOUS EPITHELIAL: <1 /HPF
UROBILINOGEN UR STRIP-MCNC: 2 MG/DL
WBC URINE: 5 /HPF

## 2024-05-08 PROCEDURE — 99309 SBSQ NF CARE MODERATE MDM 30: CPT | Performed by: NURSE PRACTITIONER

## 2024-05-08 PROCEDURE — 81001 URINALYSIS AUTO W/SCOPE: CPT | Performed by: NURSE PRACTITIONER

## 2024-05-08 NOTE — PROGRESS NOTES
"[facility] GERIATRICS    Chief Complaint   Patient presents with    RECHECK     HPI:  Teresa Park is a 89 year old  (11/21/1930), who is being seen today for an episodic care visit at: [facility] (TCU) [[id number]].     Per recent TCU provider progress notes:   89 y.o. female PMH severe AS, CAD, chronic systolic CHF, EF 35-40%, HLD, HTN and thoracic aortic aneurysm hospitalized in March with pulmonary edema and severe AS and diuresed, readmitted 4/30 with acute on chronic systolic CHF, acute hypoxic respiratory failure, severe aortic stenosis, HTN, nonischemic cardiomyopathy recent EF 35;40%, low K. BNP on admission 11, 826. Treated with Bipap, IV lasix. Cardiology: now s/p TAVR on 5/3. On lasix, asa and Plavix x 6 months then asa alone. Had elevated troponin due to demand ischemia. Tested positive for COVID19, now off steroids. Depression stable. Anemia due to CKD stable. To TCU for rehab.     Today's concern is: episodic follow-up recent fall, weakness/confusion, vs. Fell last night while up independently, denied injury. Daughter present reports patient confused, weaker, hallucinating. Also c/o pains and aches after fall asks for tylenol (reportedly has allergy to tylenol but patient and daughter deny). BP range 105-132/56-66 and sats 94% room air. Walked 45 ft with 4WW.     Allergies, and PMH/PSH reviewed in EPIC today.  REVIEW OF SYSTEMS:  4 point ROS including Respiratory, CV, GI and , other than that noted in the HPI,  is negative    Objective:   /66   Pulse 57   Temp 98.1  F (36.7  C)   Resp 18   Ht 1.575 m (5' 2\")   Wt 49.5 kg (109 lb 3.2 oz)   SpO2 96%   BMI 19.97 kg/m    GENERAL APPEARANCE:  Confused, in no distress, pleasant, cooperative, oriented x self and daughter  EYES:  EOM, lids, pupils and irises normal, sclera clear and conjunctiva normal, no discharge or mattering on lids or lashes noted  ENT:  Mouth normal, moist mucous membranes, nose normal without drainage or " crusting, external ears without lesions, hearing acuity impaired  RESP:  respiratory effort normal, no chest wall tenderness, no respiratory distress, lung sounds clear, patient is on room air  CV:  Auscultation of heart done, rate and rhythm controlled and regular, no murmur, no rub or gallop. Edema none bilateral lower extremities  M/S:   Gait and station walks with assist , no tenderness or swelling of the joints; able to move all extremities, digits normal  NEURO: cranial nerves 2-12 grossly intact, no facial asymmetry, able to follow directions  PSYCH:  insight and judgement and memory appear impaired, affect and mood normal     Most Recent 3 CBC's:  Recent Labs   Lab Test 05/09/24  0945 02/21/24  0805 02/18/24  0850   WBC 7.9 7.4 6.2   HGB 9.6* 11.9 11.3*   MCV 93 89 90    230 228     Most Recent 3 BMP's:  Recent Labs   Lab Test 05/09/24  0945 04/15/24  1315 03/26/24  1220    146* 144   POTASSIUM 3.5 3.5 3.8   CHLORIDE 95* 108* 105   CO2 29 24 28   BUN 37.4* 21.4 27.3*   CR 1.67* 1.16* 1.25*   ANIONGAP 12 14 11   DANIELA 8.7 8.5 8.9   GLC 95 121* 80       Assessment/Plan:  Confusion and fall  Occurred last night, per family patient weaker and more confused. Obtain CBC and BMP tomorrow as planned, monitor for safety. Check UA/UC and follow-up results.     Acute respiratory failure with hypoxia and hypercapnia (H)  Acute on chronic systolic congestive heart failure (H)  Severe aortic stenosis  S/P TAVR (transcatheter aortic valve replacement)  Hypertension, unspecified type  Nonischemic cardiomyopathy (H)  Acute on chronic. Continue asa 81 mg daily, lasix 20 mg daily, Toprol XL 25 mg daily, entresto 49-51 mg tabs take 1 BID. Monitor vs and wt. Follow-up cardiology as recommended.     Infection due to 2019 novel coronavirus  Asymptomatic. Monitor respiratory status. Isolation through 5/10.     ABLA (acute blood loss anemia)  Acute with illness. Hgb 10.4 last check. Check CBC on 5/9    Stage 3b chronic  kidney disease (H)  Chronic. Cr 1.52. check BMP on 5/9, avoid nephrotoxins.     Recurrent major depressive disorder, remission status unspecified (H24)  Continue PTA Celexa 20 mg daily, Neurontin 100 mg HS PRN insomnia, Wellbutrin 100 mg BID, monitor mood.     Physical deconditioning  Acute on chronic. Therapies eval and treat, follow-up progress. Due to pains and aches schedule tylenol 650 mg Po TID and daily PRN.     MED REC REQUIRED  Post Medication Reconciliation Status: discharge medications reconciled and changed, per note/orders      Orders:  Tylenol 650 mg PO TID and daily PRN pain  Remove tylenol from allergy list  UA/UC diagnosis weakness, fall, confusion    Electronically signed by: MAITE Caro CNP

## 2024-05-09 LAB
ANION GAP SERPL CALCULATED.3IONS-SCNC: 12 MMOL/L (ref 7–15)
BUN SERPL-MCNC: 37.4 MG/DL (ref 8–23)
CALCIUM SERPL-MCNC: 8.7 MG/DL (ref 8.2–9.6)
CHLORIDE SERPL-SCNC: 95 MMOL/L (ref 98–107)
CREAT SERPL-MCNC: 1.67 MG/DL (ref 0.51–0.95)
DEPRECATED HCO3 PLAS-SCNC: 29 MMOL/L (ref 22–29)
EGFRCR SERPLBLD CKD-EPI 2021: 28 ML/MIN/1.73M2
ERYTHROCYTE [DISTWIDTH] IN BLOOD BY AUTOMATED COUNT: 14.5 % (ref 10–15)
GAMMA INTERFERON BACKGROUND BLD IA-ACNC: 0.01 IU/ML
GLUCOSE SERPL-MCNC: 95 MG/DL (ref 70–99)
HCT VFR BLD AUTO: 29 % (ref 35–47)
HGB BLD-MCNC: 9.6 G/DL (ref 11.7–15.7)
M TB IFN-G BLD-IMP: NEGATIVE
M TB IFN-G CD4+ BCKGRND COR BLD-ACNC: 1.14 IU/ML
MCH RBC QN AUTO: 30.9 PG (ref 26.5–33)
MCHC RBC AUTO-ENTMCNC: 33.1 G/DL (ref 31.5–36.5)
MCV RBC AUTO: 93 FL (ref 78–100)
MITOGEN IGNF BCKGRD COR BLD-ACNC: 0 IU/ML
MITOGEN IGNF BCKGRD COR BLD-ACNC: 0 IU/ML
PLATELET # BLD AUTO: 286 10E3/UL (ref 150–450)
POTASSIUM SERPL-SCNC: 3.5 MMOL/L (ref 3.4–5.3)
RBC # BLD AUTO: 3.11 10E6/UL (ref 3.8–5.2)
SODIUM SERPL-SCNC: 136 MMOL/L (ref 135–145)
WBC # BLD AUTO: 7.9 10E3/UL (ref 4–11)

## 2024-05-09 PROCEDURE — P9604 ONE-WAY ALLOW PRORATED TRIP: HCPCS | Performed by: NURSE PRACTITIONER

## 2024-05-09 PROCEDURE — 36415 COLL VENOUS BLD VENIPUNCTURE: CPT | Performed by: NURSE PRACTITIONER

## 2024-05-09 PROCEDURE — 85027 COMPLETE CBC AUTOMATED: CPT | Performed by: NURSE PRACTITIONER

## 2024-05-09 PROCEDURE — 80048 BASIC METABOLIC PNL TOTAL CA: CPT | Performed by: NURSE PRACTITIONER

## 2024-05-14 VITALS
SYSTOLIC BLOOD PRESSURE: 142 MMHG | TEMPERATURE: 97.3 F | HEART RATE: 72 BPM | WEIGHT: 111.2 LBS | RESPIRATION RATE: 16 BRPM | DIASTOLIC BLOOD PRESSURE: 64 MMHG | BODY MASS INDEX: 20.46 KG/M2 | HEIGHT: 62 IN | OXYGEN SATURATION: 97 %

## 2024-05-14 NOTE — PROGRESS NOTES
"[facility] GERIATRICS    Chief Complaint   Patient presents with    RECHECK     HPI:  Teresa Park is a 89 year old  (11/21/1930), who is being seen today for an episodic care visit at: [facility] (TC) [[id number]]. Today's concern is:   Acute sCHF, severe aortic stenosis a/p TAVR, HTN, cardiomyopathy   BP as follows 146/71, 142/64, 163/66 with HR 70-80 range, denies CP, palpitations, SOB  In therapy patient is walking up to 250 feet using a 4ww with SBA  Cognitive impairment [place] 16/30 indicting moderate impairment  Anemia: Hgb 9.6 5/9/24  CKD: creat 1.67 on 5/9/24      Allergies, and PMH/PSH reviewed in [facility] today.  REVIEW OF SYSTEMS:  10 point ROS of systems including Constitutional, Eyes, Respiratory, Cardiovascular, Gastroenterology, Genitourinary, Integumentary, Musculoskeletal, Psychiatric were all negative except for pertinent positives noted in my HPI.    Objective:   BP (!) 142/64   Pulse 72   Temp 97.3  F (36.3  C)   Resp 16   Ht 1.575 m (5' 2\")   Wt 50.4 kg (111 lb 3.2 oz)   SpO2 97%   BMI 20.34 kg/m    GENERAL APPEARANCE:  Alert, in no distress  ENT:  Mouth and posterior oropharynx normal, moist mucous membranes, [place]  EYES:  EOM, conjunctivae, lids, pupils and irises normal, PERRL  RESP:  respiratory effort and palpation of chest normal, lungs clear to auscultation , no respiratory distress  CV:  Palpation and auscultation of heart done , regular rate and rhythm, no murmur, rub, or gallop, no edema  ABDOMEN:  normal bowel sounds, soft, nontender, no hepatosplenomegaly or other masses  M/S:   patient sitting up in chair  SKIN:  Inspection of skin and subcutaneous tissue baseline  NEURO:   speech wnl  PSYCH:  affect and mood normal    Recent labs in [facility] reviewed by me today.  and Most Recent 3 CBC's:  Recent Labs   Lab Test 05/09/24  0945 02/21/24  0805 02/18/24  0850   WBC 7.9 7.4 6.2   HGB 9.6* 11.9 11.3*   MCV 93 89 90    230 228     Most Recent 3 BMP's:  Recent Labs   Lab " Test 05/09/24  0945 04/15/24  1315 03/26/24  1220    146* 144   POTASSIUM 3.5 3.5 3.8   CHLORIDE 95* 108* 105   CO2 29 24 28   BUN 37.4* 21.4 27.3*   CR 1.67* 1.16* 1.25*   ANIONGAP 12 14 11   DANIELA 8.7 8.5 8.9   GLC 95 121* 80       Assessment/Plan:  (I50.23) Acute on chronic systolic congestive heart failure (H)  (primary encounter diagnosis)  (I35.0) Severe aortic stenosis  (Z95.2) S/P TAVR (transcatheter aortic valve replacement)  (I10) Hypertension, unspecified type  (I42.8) Nonischemic cardiomyopathy (H)  Comment: acute/ongoing  Plan: PT and OT, lasix 20mg QD, toprol xl 25mg QD, entresto 49/51mg BID, ASA 81mg QD  F/u with cardiology as directed    (U07.1) Infection due to 2019 novel coronavirus  Comment: resolved off isolation 5/10/24  Plan: monitor SaO2 at rest and with activity    (D62) ABLA (acute blood loss anemia)  Comment: acute/ongoing  Hgb 9.6 on 5/9/24  Plan: Hgb follow    (N18.32) Stage 3b chronic kidney disease (H)  Comment: acute/ongoing  Creat 1.67 on 5/9/24  Baseline creat ~1.5  Plan: avoid nephrotoxic agents, BMP follow    (F33.9) Recurrent major depressive disorder, remission status unspecified (H24)  Comment: ongoing  Plan: continue celexa 20mg QD, bupropion 100mg BID    (R53.81) Physical deconditioning  Comment: acute/ongoing  Plan: PT and OT    (R41.0) Confusion  (R41.89) cognitive impairment  Comment: acute/ongoing  SLUMS 16/30  Plan: SW to assist with discharge planning        MED REC REQUIRED  Post Medication Reconciliation Status: medication reconcilation previously completed during another office visit      Orders:  No new orders    Total time with patient visit: 45 minutes including discussions about the POC and care coordination with the patient. Greater than 50% of total time spent with counseling and coordinating care due to reviewed nursing and therapy progress notes, medications and lab results, discussed medications at bedside with patient, updated primary NP on BMP and  kalie. .   Electronically signed by: Tonya Lynn Haase, APRN CNP

## 2024-05-15 ENCOUNTER — TRANSITIONAL CARE UNIT VISIT (OUTPATIENT)
Dept: GERIATRICS | Facility: CLINIC | Age: 89
End: 2024-05-15
Payer: MEDICARE

## 2024-05-15 DIAGNOSIS — R41.0 CONFUSION: ICD-10-CM

## 2024-05-15 DIAGNOSIS — I35.0 SEVERE AORTIC STENOSIS: ICD-10-CM

## 2024-05-15 DIAGNOSIS — I50.23 ACUTE ON CHRONIC SYSTOLIC CONGESTIVE HEART FAILURE (H): Primary | ICD-10-CM

## 2024-05-15 DIAGNOSIS — U07.1 INFECTION DUE TO 2019 NOVEL CORONAVIRUS: ICD-10-CM

## 2024-05-15 DIAGNOSIS — Z95.2 S/P TAVR (TRANSCATHETER AORTIC VALVE REPLACEMENT): ICD-10-CM

## 2024-05-15 DIAGNOSIS — I10 HYPERTENSION, UNSPECIFIED TYPE: ICD-10-CM

## 2024-05-15 DIAGNOSIS — N18.32 STAGE 3B CHRONIC KIDNEY DISEASE (H): ICD-10-CM

## 2024-05-15 DIAGNOSIS — R53.81 PHYSICAL DECONDITIONING: ICD-10-CM

## 2024-05-15 DIAGNOSIS — F33.9 RECURRENT MAJOR DEPRESSIVE DISORDER, REMISSION STATUS UNSPECIFIED (H): ICD-10-CM

## 2024-05-15 DIAGNOSIS — R41.89 COGNITIVE IMPAIRMENT: ICD-10-CM

## 2024-05-15 DIAGNOSIS — I42.8 NONISCHEMIC CARDIOMYOPATHY (H): ICD-10-CM

## 2024-05-15 DIAGNOSIS — D62 ABLA (ACUTE BLOOD LOSS ANEMIA): ICD-10-CM

## 2024-05-15 PROCEDURE — 99310 SBSQ NF CARE HIGH MDM 45: CPT | Performed by: NURSE PRACTITIONER

## 2024-05-16 VITALS
RESPIRATION RATE: 16 BRPM | BODY MASS INDEX: 20.46 KG/M2 | DIASTOLIC BLOOD PRESSURE: 58 MMHG | OXYGEN SATURATION: 96 % | TEMPERATURE: 97.6 F | SYSTOLIC BLOOD PRESSURE: 120 MMHG | HEIGHT: 62 IN | WEIGHT: 111.2 LBS | HEART RATE: 83 BPM

## 2024-05-16 NOTE — PROGRESS NOTES
"[facility] GERIATRICS    Chief Complaint   Patient presents with    RECHECK     HPI:  Teresa Park is a 89 year old  (11/21/1930), who is being seen today for an episodic care visit at: [facility] (TCU) [[id number]].     Per recent TCU provider progress notes:   89 y.o. female PMH severe AS, CAD, chronic systolic CHF, EF 35-40%, HLD, HTN and thoracic aortic aneurysm hospitalized in March with pulmonary edema and severe AS and diuresed, readmitted 4/30 with acute on chronic systolic CHF, acute hypoxic respiratory failure, severe aortic stenosis, HTN, nonischemic cardiomyopathy recent EF 35;40%, low K. BNP on admission 11, 826. Treated with Bipap, IV lasix. Cardiology: now s/p TAVR on 5/3. On lasix, asa and Plavix x 6 months then asa alone. Had elevated troponin due to demand ischemia. Tested positive for COVID19, now off steroids. Depression stable. Anemia due to CKD stable. To TCU for rehab.     Today's concern is: follow-up vs, labs, mobility. No new concerns, walking 100 ft with 2WW. BP range 120-172/58-80 and sats 96% room air. SLUMS 16/30.    Allergies, and PMH/PSH reviewed in EPIC today.  REVIEW OF SYSTEMS:  4 point ROS including Respiratory, CV, GI and , other than that noted in the HPI,  is negative    Objective:   /58   Pulse 83   Temp 97.6  F (36.4  C)   Resp 16   Ht 1.575 m (5' 2\")   Wt 50.4 kg (111 lb 3.2 oz)   SpO2 96%   BMI 20.34 kg/m    GENERAL APPEARANCE:  Confused, in no distress, pleasant, cooperative, oriented x self and place  EYES:  EOM, lids, pupils and irises normal, sclera clear and conjunctiva normal, no discharge or mattering on lids or lashes noted  ENT:  Mouth normal, moist mucous membranes, nose normal without drainage or crusting, external ears without lesions, hearing acuity impaired  RESP:  respiratory effort normal, no chest wall tenderness, no respiratory distress, lung sounds clear, on room air  CV:  Auscultation of heart done, rate and rhythm controlled " and regular, no murmur, no rub or gallop. Edema none bilateral lower extremities  M/S:   Gait and station walks with assist, no tenderness or swelling of the joints; able to move all extremities, digits normal  NEURO: cranial nerves 2-12 grossly intact, no facial asymmetry, able to follow directions  PSYCH:  insight and judgement and memory impaired, affect and mood normal     Most Recent 3 CBC's:  Recent Labs   Lab Test 05/09/24  0945 02/21/24  0805 02/18/24  0850   WBC 7.9 7.4 6.2   HGB 9.6* 11.9 11.3*   MCV 93 89 90    230 228     Most Recent 3 BMP's:  Recent Labs   Lab Test 05/20/24  0456 05/09/24  0945 04/15/24  1315    136 146*   POTASSIUM 3.6 3.5 3.5   CHLORIDE 107 95* 108*   CO2 28 29 24   BUN 24.2* 37.4* 21.4   CR 0.99* 1.67* 1.16*   ANIONGAP 10 12 14   DANIELA 8.2 8.7 8.5   GLC 71 95 121*       Assessment/Plan:  Acute respiratory failure with hypoxia and hypercapnia (H)  Acute on chronic systolic congestive heart failure (H)  Severe aortic stenosis  S/P TAVR (transcatheter aortic valve replacement)  Hypertension, unspecified type  Nonischemic cardiomyopathy (H)  Acute on chronic, currently stable and asymptomatic. Continue asa 81 mg daily, lasix 20 mg daily, Toprol XL 25 mg daily, entresto 49-51 mg tabs take 1 BID. Monitor vs and wt. Follow-up cardiology as recommended.     Infection due to 2019 novel coronavirus  Asymptomatic. Off isolation.     ABLA (acute blood loss anemia)  Acute with illness. Hgb 10.4 last check. Checked CBC on 5/9 and Hgb 9.6. monitor PRN.    Stage 3b chronic kidney disease (H)  Chronic. Cr 1.52. Check BMP on 5/9 and Cr 1.67: push fluids, avoid nephrotoxins, recheck BMP on 5/20.    Recurrent major depressive disorder, remission status unspecified (H24)  Continue PTA Celexa 20 mg daily, Neurontin 100 mg HS PRN insomnia, Wellbutrin 100 mg BID, monitor mood.     Physical deconditioning  Acute on chronic. Therapies eval and treat, follow-up progress. Due to pains and aches  scheduled tylenol 650 mg Po TID and daily PRN, effective.    MED REC REQUIRED  Post Medication Reconciliation Status: discharge medications reconciled and changed, per note/orders    Orders:  Push fluids at least 1500 ml daily  Check BMP 5/20 diagnosis CKD    Electronically signed by: MAITE Caro CNP

## 2024-05-17 ENCOUNTER — TRANSITIONAL CARE UNIT VISIT (OUTPATIENT)
Dept: GERIATRICS | Facility: CLINIC | Age: 89
End: 2024-05-17
Payer: MEDICARE

## 2024-05-17 DIAGNOSIS — J96.01 ACUTE RESPIRATORY FAILURE WITH HYPOXIA AND HYPERCAPNIA (H): Primary | ICD-10-CM

## 2024-05-17 DIAGNOSIS — R53.81 PHYSICAL DECONDITIONING: ICD-10-CM

## 2024-05-17 DIAGNOSIS — I35.0 SEVERE AORTIC STENOSIS: ICD-10-CM

## 2024-05-17 DIAGNOSIS — I50.23 ACUTE ON CHRONIC SYSTOLIC CONGESTIVE HEART FAILURE (H): ICD-10-CM

## 2024-05-17 DIAGNOSIS — I10 HYPERTENSION, UNSPECIFIED TYPE: ICD-10-CM

## 2024-05-17 DIAGNOSIS — Z95.2 S/P TAVR (TRANSCATHETER AORTIC VALVE REPLACEMENT): ICD-10-CM

## 2024-05-17 DIAGNOSIS — F33.9 RECURRENT MAJOR DEPRESSIVE DISORDER, REMISSION STATUS UNSPECIFIED (H): ICD-10-CM

## 2024-05-17 DIAGNOSIS — J96.02 ACUTE RESPIRATORY FAILURE WITH HYPOXIA AND HYPERCAPNIA (H): Primary | ICD-10-CM

## 2024-05-17 DIAGNOSIS — I42.8 NONISCHEMIC CARDIOMYOPATHY (H): ICD-10-CM

## 2024-05-17 DIAGNOSIS — N18.32 STAGE 3B CHRONIC KIDNEY DISEASE (H): ICD-10-CM

## 2024-05-17 DIAGNOSIS — U07.1 INFECTION DUE TO 2019 NOVEL CORONAVIRUS: ICD-10-CM

## 2024-05-17 DIAGNOSIS — D62 ABLA (ACUTE BLOOD LOSS ANEMIA): ICD-10-CM

## 2024-05-17 PROCEDURE — 99309 SBSQ NF CARE MODERATE MDM 30: CPT | Performed by: NURSE PRACTITIONER

## 2024-05-19 ENCOUNTER — LAB REQUISITION (OUTPATIENT)
Dept: LAB | Facility: CLINIC | Age: 89
End: 2024-05-19

## 2024-05-19 DIAGNOSIS — N17.9 ACUTE KIDNEY FAILURE, UNSPECIFIED (H): ICD-10-CM

## 2024-05-20 LAB
ANION GAP SERPL CALCULATED.3IONS-SCNC: 10 MMOL/L (ref 7–15)
BUN SERPL-MCNC: 24.2 MG/DL (ref 8–23)
CALCIUM SERPL-MCNC: 8.2 MG/DL (ref 8.2–9.6)
CHLORIDE SERPL-SCNC: 107 MMOL/L (ref 98–107)
CREAT SERPL-MCNC: 0.99 MG/DL (ref 0.51–0.95)
DEPRECATED HCO3 PLAS-SCNC: 28 MMOL/L (ref 22–29)
EGFRCR SERPLBLD CKD-EPI 2021: 53 ML/MIN/1.73M2
GLUCOSE SERPL-MCNC: 71 MG/DL (ref 70–99)
POTASSIUM SERPL-SCNC: 3.6 MMOL/L (ref 3.4–5.3)
SODIUM SERPL-SCNC: 145 MMOL/L (ref 135–145)

## 2024-05-20 PROCEDURE — P9604 ONE-WAY ALLOW PRORATED TRIP: HCPCS | Performed by: NURSE PRACTITIONER

## 2024-05-20 PROCEDURE — 36415 COLL VENOUS BLD VENIPUNCTURE: CPT | Performed by: NURSE PRACTITIONER

## 2024-05-20 PROCEDURE — 80048 BASIC METABOLIC PNL TOTAL CA: CPT | Performed by: NURSE PRACTITIONER

## 2024-05-20 NOTE — PROGRESS NOTES
[facility]  INITIAL VISIT NOTE  May 21, 2024    PRIMARY CARE PROVIDER AND CLINIC:  Nelson, Tara Ann [facility] [address] [facility] [place] [place]    CHIEF COMPLAINT:  Hospital follow-up/Initial visit     HPI:    Teresa Park is a 89 year old  (11/21/1930) female who was seen at [facility] on May 21, 2024 for an initial visit.     Medical history is notable for severe aortic stenosis, CHF, CAD, hypertension, dyslipidemia, LBBB, CKD, anemia, depression, hearing loss, osteoarthritis, COVID-19 infection, and pulmonary opacities.    Summary of hospital course:  Patient was hospitalized at [facility] from April 30 through May 6, 2024 for pulmonary edema and acute on chronic systolic heart failure due to severe aortic stenosis.  Chest CT was consistent with pulmonary edema.  Initial laboratory studies was significant for BNP 11,826.  She was placed on BiPAP in the emergency department and treated with IV diuretics.  Cardiology was consulted and they recommended TAVR which was done on May 3.  IV diuretic therapy eventually transitioned to oral furosemide.  Cardiology recommended dual antiplatelet therapy with aspirin and Plavix till discharge then aspirin alone.  During the hospital stay, she had intermittent junctional rhythm related to TAVR.  Notably, she was tested positive for COVID-19 on April 30 that was treated with few days of steroids but her dyspnea was primarily attributed to acute CHF.  TCU was recommended for rehab.    Patient is admitted to this facility for medical management, nursing care, and subacute rehab.     Of note, history was obtained from patient, facility staff, and extensive review of the chart including hospital admission note, consult notes, and discharge summary.    Today's visit:  Patient was seen in her room, lying in bed.  She appears somewhat frail but comfortable and cheerful.  She is hard of hearing and wears hearing aids.   He does endorse dyspnea on exertion.  She denies fever, chills, chest pain, palpitation, nausea, vomiting, abdominal pain, or urinary symptoms.  She had a BM earlier today.  She has an appointment with cardiology scheduled for later today at 2 PM.      CODE STATUS:   DNR / DNI    PAST MEDICAL HISTORY:   Severe aortic stenosis s/p TAVR on May 3, 2024  Mild mitral regurgitation  Chronic HFrEF (LVEF 30%, per echo on May 4, 2024)  Multivessel CAD (per coronary angiogram on 2024, showing 40% stenosis of RCA, 40-45% stenosis of first diagonal, 50% stenosis of proximal LAD, 55% stenosis of mid circumflex, and 35% stenosis of second marginal)  Hypertension  Dyslipidemia  LBBB  Intermittent junctional rhythm in May 2024, related to TAVR  CKD stage III; baseline creatinine 1.1-1.3  Chronic anemia; baseline hemoglobin around 11  Depression  Hearing loss  Osteoarthritis  COVID-19 infection in 2024  Cognitive impairment   GABRIELLA and RUL groundglass pulmonary opacities (measuring 1 cm and 0.7 cm respectively, per CTA on May 1, 2024)    PAST SURGICAL HISTORY:   Past Surgical History:   Procedure Laterality Date    CV CORONARY ANGIOGRAM N/A 2024    Procedure: Coronary Angiogram;  Surgeon: Yang Mitchell MD;  Location:  HEART CARDIAC CATH LAB    CV INSTANTANEOUS WAVE-FREE RATIO N/A 2024    Procedure: Instantaneous Wave-Free Ratio;  Surgeon: Yang Mitchell MD;  Location:  HEART CARDIAC CATH LAB    CV LEFT HEART CATH N/A 2024    Procedure: Left Heart Catheterization;  Surgeon: Yang Mitchell MD;  Location:  HEART CARDIAC CATH LAB       FAMILY HISTORY:   Family history is significant for breast cancer in her maternal aunt and depression in her sister.    SOCIAL HISTORY:  Social History     Tobacco Use    Smoking status: Former     Current packs/day: 0.00     Types: Cigarettes     Quit date: 1951     Years since quittin.8    Smokeless tobacco: Former   Substance Use Topics     "Alcohol use: Yes     Comment: a small drink almost every day       MEDICATIONS:  Current Outpatient Medications   Medication Sig Dispense Refill    aspirin 81 MG tablet Take by mouth daily      buPROPion (WELLBUTRIN) 100 MG tablet Take 100 mg by mouth 2 times daily      citalopram (CELEXA) 20 MG tablet Take 20 mg by mouth every morning      furosemide (LASIX) 20 MG tablet Take 1 tablet (20 mg) by mouth daily May take an extra dose for shortness of breath. If no improvement with an extra dose, CALL [facility]. 90 tablet 4    gabapentin (NEURONTIN) 100 MG capsule Take 100 mg by mouth nightly as needed for other (pain, insomnia)      metoprolol succinate ER (TOPROL XL) 25 MG 24 hr tablet Take 1 tablet (25 mg) by mouth daily 90 tablet 4    sacubitril-valsartan (ENTRESTO) 49-51 MG per tablet Take 1 tablet by mouth 2 times daily         MED REC REQUIRED  Post Medication Reconciliation Status: medication reconcilation previously completed during another office visit       ALLERGIES:  Allergies   Allergen Reactions    Dextromethorphan     Doxylamine     Morphine Nausea       ROS:  10 point ROS were negative other than the symptoms noted above in the HPI.    PHYSICAL EXAM:  Vital signs were reviewed in the chart.  Vital Signs: BP (!) 151/72   Pulse 77   Temp 97.3  F (36.3  C)   Resp 16   Ht 1.575 m (5' 2\")   Wt 54.3 kg (119 lb 12.8 oz)   SpO2 95%   BMI 21.91 kg/m     General: Comfortable and in no acute distress  HEENT: Conjunctival pallor, no scleral icterus or injection, moist oral mucosa  Cardiovascular: Normal S1, S2, RRR; elevated JVP; Zio patch is on  Respiratory: Lungs clear to auscultation bilaterally; no wheezing, rhonchi, crackles  GI: Abdomen soft, non-tender, non-distended, +BS  Extremities: 1+ LE edema  Neuro: CX II-XII grossly intact except for decreased hearing; ROM in all four extremities grossly intact  Psych: Alert and oriented almost x3; normal affect  Skin: No acute rash    LABORATORY/IMAGING " DATA:  All relevant labs and imaging data in [facility] and/or Care Everywhere were personally reviewed today.    Most Recent 3 CBC's:  Recent Labs   Lab Test 05/09/24  0945 02/21/24  0805 02/18/24  0850   WBC 7.9 7.4 6.2   HGB 9.6* 11.9 11.3*   MCV 93 89 90    230 228     Most Recent 3 BMP's:  Recent Labs   Lab Test 05/20/24  0456 05/09/24  0945 04/15/24  1315    136 146*   POTASSIUM 3.6 3.5 3.5   CHLORIDE 107 95* 108*   CO2 28 29 24   BUN 24.2* 37.4* 21.4   CR 0.99* 1.67* 1.16*   ANIONGAP 10 12 14   DANIELA 8.2 8.7 8.5   GLC 71 95 121*     Most Recent 2 LFT's:  Recent Labs   Lab Test 02/16/24  0554   AST 37   ALT 20   ALKPHOS 110   BILITOTAL 0.3         ASSESSMENT/PLAN:  Severe aortic stenosis s/p TAVR on May 3, 2024.  Stable.  Plan:  Continue aspirin 81 mg daily   Follow-up with cardiology later today as scheduled     Acute on chronic HFrEF (LVEF 30%, per echo on May 4, 2024),  Acute pulmonary edema,  Mild mitral regurgitation.  Acute CHF likely due to severe aortic stenosis.  Patient was diuresed with IV furosemide in the hospital.  Patient currently weighs 111.2 LBS and has 1+ bilateral lower extremity edema.  Plan:  Continue low-salt diet and 1500 mL fluid restriction  Continue furosemide 20 mg daily  Continue sacubitril-valsartan 49-51 mg, 1 tablet twice daily  Continue metoprolol ER 25 mg daily  Monitor weight and volume status  Follow-up with cardiology later today as scheduled     Multivessel CAD (per coronary angiogram on February 20, 2024, showing 40% stenosis of RCA, 40-45% stenosis of first diagonal, 50% stenosis of proximal LAD, 55% stenosis of mid circumflex, and 35% stenosis of second marginal),  Hypertension,  Dyslipidemia.  CAD is being treated medically.  Patient is not on statin therapy.  Blood pressure at times is mildly elevated.  Plan:  Continue aspirin 81 mg daily  Continue metoprolol ER 25 mg daily  Continue furosemide 20 mg daily  Continue sacubitril-valsartan 49-51 mg, 1 tablet twice  daily  Monitor blood pressure and cardiac status  Follow-up with cardiology later today as scheduled     Intermittent junctional rhythm,  LBBB.  Junctional rhythm occurred with TAVR.  Zio patch placed at cardiology office on May 10.  Plan  Complete the Zio patch   Follow-up with cardiology as directed    COVID-19 infection.  Tested positive on April 30.  Not required COVID-specific treatment.  She was treated with few days of steroids in the hospital.  She remained on isolation until May 10.  Stable from a respiratory standpoint  Plan  Monitor respiratory status    CKD stage III.  Baseline creatinine 1.1-1.3.  Most recent creatinine 0.99 on May 20.  Plan:  Avoid NSAIDs and nephrotoxins  Repeat BMP on May 28    Acute on chronic anemia.  Baseline hemoglobin around 11.  Most recent hemoglobin 9.6 on May 9.  Plan:  Monitor for signs or symptoms of blood loss  Repeat hemoglobin on May 28    Depression,  Insomnia.  Plan:  Continue citalopram 20 mg daily  Continue bupropion  mg twice daily  Continue gabapentin 100 mg PRN at bedtime  Monitor symptoms    Cognitive impairment.   SLUMS score 16/30 and CPT 4.3/5.6 this TCU stay.  Today, she is oriented almost x 3.  Plan:  Complete cognitive evaluation per OT for safe discharge planning    Physical deconditioning.  Plan:  Continue PT/OT evaluation and therapy     INCIDENTAL FINDINGS:   GABRIELLA and RUL groundglass pulmonary opacities.  New GABRIELLA groundglass opacity, measuring 1 cm, and stable RUL groundglass opacity, measuring 0.7 cm, per CTA on May 1, 2024.  Plan:  Follow-up as outpatient        Orders written by provider at facility:  BMP on May 28, DX: CHF, CKD  Hemoglobin on May 28, DX: Anemia        Total time: 60 minutes including face to face time with the patient, reviewing the notes, labs, and imaging in [facility], ordering new labs, communicating the plan of care with RN, and documenting all information electronically.       Disclaimer: This note contains text created using  speech-recognition software and may have unintended word substitutions.    Electronically signed by:  Kale Del Valle MD      Addendum:  Patient may discharge home on 5/24 with all current meds and orders and [facility] home care for PT, OT and HHA. Follow-up PCP 2-3 weeks and cardiology as indicated.     Electronically signed by MAITE Caro, CRESCENCIO

## 2024-05-21 ENCOUNTER — TRANSITIONAL CARE UNIT VISIT (OUTPATIENT)
Dept: GERIATRICS | Facility: CLINIC | Age: 89
End: 2024-05-21
Payer: MEDICARE

## 2024-05-21 VITALS
WEIGHT: 119.8 LBS | HEIGHT: 62 IN | TEMPERATURE: 97.3 F | HEART RATE: 77 BPM | SYSTOLIC BLOOD PRESSURE: 151 MMHG | DIASTOLIC BLOOD PRESSURE: 72 MMHG | OXYGEN SATURATION: 95 % | BODY MASS INDEX: 22.05 KG/M2 | RESPIRATION RATE: 16 BRPM

## 2024-05-21 DIAGNOSIS — N18.30 STAGE 3 CHRONIC KIDNEY DISEASE, UNSPECIFIED WHETHER STAGE 3A OR 3B CKD (H): ICD-10-CM

## 2024-05-21 DIAGNOSIS — I10 ESSENTIAL HYPERTENSION: ICD-10-CM

## 2024-05-21 DIAGNOSIS — R91.8 GROUND GLASS OPACITY PRESENT ON IMAGING OF LUNG: ICD-10-CM

## 2024-05-21 DIAGNOSIS — I50.23 ACUTE ON CHRONIC HFREF (HEART FAILURE WITH REDUCED EJECTION FRACTION) (H): ICD-10-CM

## 2024-05-21 DIAGNOSIS — R41.89 COGNITIVE IMPAIRMENT: ICD-10-CM

## 2024-05-21 DIAGNOSIS — D64.9 ACUTE ON CHRONIC ANEMIA: ICD-10-CM

## 2024-05-21 DIAGNOSIS — F32.A DEPRESSION, UNSPECIFIED DEPRESSION TYPE: ICD-10-CM

## 2024-05-21 DIAGNOSIS — R53.81 PHYSICAL DECONDITIONING: ICD-10-CM

## 2024-05-21 DIAGNOSIS — Z20.822 SUSPECTED 2019 NOVEL CORONAVIRUS INFECTION: ICD-10-CM

## 2024-05-21 DIAGNOSIS — Z95.2 S/P TAVR (TRANSCATHETER AORTIC VALVE REPLACEMENT): ICD-10-CM

## 2024-05-21 DIAGNOSIS — I49.8 JUNCTIONAL RHYTHM: ICD-10-CM

## 2024-05-21 DIAGNOSIS — E78.5 DYSLIPIDEMIA: ICD-10-CM

## 2024-05-21 DIAGNOSIS — G47.00 INSOMNIA, UNSPECIFIED TYPE: ICD-10-CM

## 2024-05-21 DIAGNOSIS — I35.0 SEVERE AORTIC STENOSIS: Primary | ICD-10-CM

## 2024-05-21 PROCEDURE — 99306 1ST NF CARE HIGH MDM 50: CPT | Performed by: INTERNAL MEDICINE

## 2024-06-01 ENCOUNTER — HEALTH MAINTENANCE LETTER (OUTPATIENT)
Age: 89
End: 2024-06-01

## 2024-06-10 NOTE — LETTER
4/10/2018       RE: Teresa Park  [address] [id number]     Dear Colleague,    Thank you for referring your patient, Teresa Park, to the [facility] EAR NOSE AND THROAT at [facility] Please see a copy of my visit note below.    Dear Tyrel Melchor:    I had the pleasure of seeing Teresa Park in followup today at the [facility].    HISTORY OF PRESENT ILLNESS: Patient is an 87-year-old in today with her 2 daughters daughters. She was last seen in July 2015. She had a left sudden sensorineural hearing loss in 2008, normal MRI scan. She has a moderate severe sensorineural hearing loss on the right side that we have been monitoring. She comes in today with her daughters mainly to check with her hearing. She doesn't feel there has been a dramatic change, daughters have noticed problems mainly with the telephone lately. There's been no pain or drainage. Denies any dizziness. There is no dysphagia, hoarseness, patient paresthesias. She's have a hearing aid that does help, she has a bi-cross but never uses C opposite hearing aid.    MEDICATIONS: Please refer to the detailed medication reconciliation performed by my nurse today, which I have reviewed and signed.     ALLERGIES:    Allergies   Allergen Reactions     Morphine Nausea       HABITS:   Alcohol use No    History   Smoking Status     Former Smoker     Quit date: 7/21/1951   Smokeless Tobacco     Former User         PAST MEDICAL HISTORY:  Please see today's intake form (for the remainder of the PMH) which I reviewed and signed.  No past medical history on file.    FAMILY HISTORY/SOCIAL HISTORY:  No family history on file. Social History     Social History     Marital status:      Spouse name: N/A     Number of children: N/A     Years of education: N/A     Occupational History     Not on file.     Social History Main Topics     Smoking status: Former  Smoker     Quit date: 7/21/1951     Smokeless tobacco: Former User     Alcohol use No     Drug use: No     Sexual activity: Not on file     Other Topics Concern     Not on file     Social History Narrative       REVIEW OF SYSTEMS: Please see today's intake form (for the remainder of the ROS) which I have reviewed and signed.    PHYSICIAL EXAMINATION:  Constitutional: The patient was well-groomed and in no acute distress.   Skin: Warm and pink.  Psychiatric: The patient's affect was calm, cooperative, and appropriate.   Respiratory: Breathing comfortably without stridor or exertion of accessory muscles.  Eyes: Pupils were equal and reactive. Extraocular movement intact.   Head: Normocephalic and atraumatic. No lesions or scars.  Ears: Both ears examined and she does have considerable cerumen. For full visualization of the TM, right side was cleaned using microscope, curet, and similar techniques. Following cleaning, under high-power magnification, TM and middle ear looked normal. The opposite ear was cleaned and examined using microscope, curet, and similar techniques. TM and middle ear looked normal after cleaning.  Nose: Sinuses were nontender. Anterior rhinoscopy revealed midline septum and absence of purulence or polyps.  Oral Cavity: Normal tongue, floor of moth, buccal mucosa, and palate. No abnormal lymph tissue in the oropharynx.   Neck: The parotid is soft without masses. Supple with normal laryngeal and tracheal landmarks.   Lymphatic: There is no palpable lymphadenopathy or other masses in the neck.   Neurologic: Alert and oriented x 3. Cranial nerves III-XI within normal limits. Voice quality normal.  Cerebellar Function Tests:  Grossly normal    Audiogram:  Audiogram performed shows no hearing in the left ear with 0% discrimination. Right side shows a moderate severe slightly worse in the high frequencies sensorineural hearing loss with discrimination at 60%. I do not have any old hearing test to compare  to.    IMPRESSION AND PLAN: Discussed her normal exam today after cleaning of cerumen. We discussed hearing aids as the only option to help with her hearing, she is going to look into getting newer hearing aids, to at least trial. I'm going to get her previous audiogram so we can make sure she is not showing progression. See her back with any concerns or problems, otherwise recommend follow-up to monitor every 1-2 years.    Thank you very much for the opportunity to participate in the care of your patient.    Rick L Nissen MD             No
